# Patient Record
Sex: MALE | Race: WHITE | NOT HISPANIC OR LATINO | URBAN - METROPOLITAN AREA
[De-identification: names, ages, dates, MRNs, and addresses within clinical notes are randomized per-mention and may not be internally consistent; named-entity substitution may affect disease eponyms.]

---

## 2023-12-04 ENCOUNTER — APPOINTMENT (OUTPATIENT)
Dept: PRIMARY CARE | Facility: CLINIC | Age: 23
End: 2023-12-04
Payer: COMMERCIAL

## 2023-12-21 ENCOUNTER — APPOINTMENT (OUTPATIENT)
Dept: PRIMARY CARE | Facility: CLINIC | Age: 23
End: 2023-12-21
Payer: COMMERCIAL

## 2024-02-13 ENCOUNTER — APPOINTMENT (OUTPATIENT)
Dept: PRIMARY CARE | Facility: CLINIC | Age: 24
End: 2024-02-13
Payer: COMMERCIAL

## 2024-04-30 ENCOUNTER — HOSPITAL ENCOUNTER (INPATIENT)
Facility: HOSPITAL | Age: 24
LOS: 10 days | Discharge: NURSING, REHAB OR LONG-TERM CARE | DRG: 519 | End: 2024-05-11
Attending: EMERGENCY MEDICINE | Admitting: ORTHOPAEDIC SURGERY
Payer: COMMERCIAL

## 2024-04-30 ENCOUNTER — APPOINTMENT (OUTPATIENT)
Dept: RADIOLOGY | Facility: HOSPITAL | Age: 24
End: 2024-04-30
Payer: COMMERCIAL

## 2024-04-30 ENCOUNTER — APPOINTMENT (OUTPATIENT)
Dept: RADIOLOGY | Facility: HOSPITAL | Age: 24
DRG: 519 | End: 2024-04-30
Payer: COMMERCIAL

## 2024-04-30 ENCOUNTER — HOSPITAL ENCOUNTER (EMERGENCY)
Facility: HOSPITAL | Age: 24
Discharge: OTHER INSTITUTION | End: 2024-04-30
Attending: STUDENT IN AN ORGANIZED HEALTH CARE EDUCATION/TRAINING PROGRAM
Payer: COMMERCIAL

## 2024-04-30 VITALS
HEART RATE: 100 BPM | HEIGHT: 75 IN | BODY MASS INDEX: 39.17 KG/M2 | TEMPERATURE: 98.1 F | SYSTOLIC BLOOD PRESSURE: 161 MMHG | DIASTOLIC BLOOD PRESSURE: 107 MMHG | RESPIRATION RATE: 16 BRPM | OXYGEN SATURATION: 97 % | WEIGHT: 315 LBS

## 2024-04-30 DIAGNOSIS — M54.41 ACUTE MIDLINE LOW BACK PAIN WITH BILATERAL SCIATICA: Primary | ICD-10-CM

## 2024-04-30 DIAGNOSIS — M51.26 LUMBAR DISC HERNIATION: ICD-10-CM

## 2024-04-30 DIAGNOSIS — R20.0 SADDLE ANESTHESIA: ICD-10-CM

## 2024-04-30 DIAGNOSIS — M54.42 ACUTE MIDLINE LOW BACK PAIN WITH BILATERAL SCIATICA: Primary | ICD-10-CM

## 2024-04-30 DIAGNOSIS — M51.26 HERNIATED LUMBAR DISC WITHOUT MYELOPATHY: ICD-10-CM

## 2024-04-30 DIAGNOSIS — M54.50 ACUTE MIDLINE LOW BACK PAIN, UNSPECIFIED WHETHER SCIATICA PRESENT: Primary | ICD-10-CM

## 2024-04-30 LAB
ALBUMIN SERPL-MCNC: 4.4 G/DL (ref 3.5–5)
ALP BLD-CCNC: 83 U/L (ref 35–125)
ALT SERPL-CCNC: 82 U/L (ref 5–40)
ANION GAP SERPL CALC-SCNC: 15 MMOL/L
APPEARANCE UR: CLEAR
AST SERPL-CCNC: 41 U/L (ref 5–40)
BASOPHILS # BLD AUTO: 0.02 X10*3/UL (ref 0–0.1)
BASOPHILS NFR BLD AUTO: 0.2 %
BILIRUB SERPL-MCNC: 1.1 MG/DL (ref 0.1–1.2)
BILIRUB UR STRIP.AUTO-MCNC: NEGATIVE MG/DL
BUN SERPL-MCNC: 11 MG/DL (ref 8–25)
CALCIUM SERPL-MCNC: 9.7 MG/DL (ref 8.5–10.4)
CHLORIDE SERPL-SCNC: 101 MMOL/L (ref 97–107)
CO2 SERPL-SCNC: 22 MMOL/L (ref 24–31)
COLOR UR: YELLOW
CREAT SERPL-MCNC: 0.7 MG/DL (ref 0.4–1.6)
EGFRCR SERPLBLD CKD-EPI 2021: >90 ML/MIN/1.73M*2
EOSINOPHIL # BLD AUTO: 0.01 X10*3/UL (ref 0–0.7)
EOSINOPHIL NFR BLD AUTO: 0.1 %
ERYTHROCYTE [DISTWIDTH] IN BLOOD BY AUTOMATED COUNT: 14 % (ref 11.5–14.5)
GLUCOSE SERPL-MCNC: 91 MG/DL (ref 65–99)
GLUCOSE UR STRIP.AUTO-MCNC: NORMAL MG/DL
HCT VFR BLD AUTO: 44.7 % (ref 41–52)
HGB BLD-MCNC: 14.6 G/DL (ref 13.5–17.5)
IMM GRANULOCYTES # BLD AUTO: 0.03 X10*3/UL (ref 0–0.7)
IMM GRANULOCYTES NFR BLD AUTO: 0.2 % (ref 0–0.9)
KETONES UR STRIP.AUTO-MCNC: ABNORMAL MG/DL
LEUKOCYTE ESTERASE UR QL STRIP.AUTO: NEGATIVE
LYMPHOCYTES # BLD AUTO: 1.27 X10*3/UL (ref 1.2–4.8)
LYMPHOCYTES NFR BLD AUTO: 9.6 %
MCH RBC QN AUTO: 27.6 PG (ref 26–34)
MCHC RBC AUTO-ENTMCNC: 32.7 G/DL (ref 32–36)
MCV RBC AUTO: 85 FL (ref 80–100)
MONOCYTES # BLD AUTO: 0.56 X10*3/UL (ref 0.1–1)
MONOCYTES NFR BLD AUTO: 4.2 %
MUCOUS THREADS #/AREA URNS AUTO: NORMAL /LPF
NEUTROPHILS # BLD AUTO: 11.35 X10*3/UL (ref 1.2–7.7)
NEUTROPHILS NFR BLD AUTO: 85.7 %
NITRITE UR QL STRIP.AUTO: NEGATIVE
NRBC BLD-RTO: 0 /100 WBCS (ref 0–0)
PH UR STRIP.AUTO: 6.5 [PH]
PLATELET # BLD AUTO: 367 X10*3/UL (ref 150–450)
POTASSIUM SERPL-SCNC: 4.1 MMOL/L (ref 3.4–5.1)
PROT SERPL-MCNC: 8.3 G/DL (ref 5.9–7.9)
PROT UR STRIP.AUTO-MCNC: ABNORMAL MG/DL
RBC # BLD AUTO: 5.29 X10*6/UL (ref 4.5–5.9)
RBC # UR STRIP.AUTO: NEGATIVE /UL
RBC #/AREA URNS AUTO: NORMAL /HPF
SODIUM SERPL-SCNC: 138 MMOL/L (ref 133–145)
SP GR UR STRIP.AUTO: 1.03
SQUAMOUS #/AREA URNS AUTO: NORMAL /HPF
UROBILINOGEN UR STRIP.AUTO-MCNC: ABNORMAL MG/DL
WBC # BLD AUTO: 13.2 X10*3/UL (ref 4.4–11.3)
WBC #/AREA URNS AUTO: NORMAL /HPF

## 2024-04-30 PROCEDURE — 2500000006 HC RX 250 W HCPCS SELF ADMINISTERED DRUGS (ALT 637 FOR ALL PAYERS)

## 2024-04-30 PROCEDURE — 2500000001 HC RX 250 WO HCPCS SELF ADMINISTERED DRUGS (ALT 637 FOR MEDICARE OP)

## 2024-04-30 PROCEDURE — 72131 CT LUMBAR SPINE W/O DYE: CPT | Performed by: STUDENT IN AN ORGANIZED HEALTH CARE EDUCATION/TRAINING PROGRAM

## 2024-04-30 PROCEDURE — 72148 MRI LUMBAR SPINE W/O DYE: CPT

## 2024-04-30 PROCEDURE — 51798 US URINE CAPACITY MEASURE: CPT

## 2024-04-30 PROCEDURE — 2500000004 HC RX 250 GENERAL PHARMACY W/ HCPCS (ALT 636 FOR OP/ED): Performed by: PHYSICIAN ASSISTANT

## 2024-04-30 PROCEDURE — 99285 EMERGENCY DEPT VISIT HI MDM: CPT | Performed by: PHYSICIAN ASSISTANT

## 2024-04-30 PROCEDURE — 36415 COLL VENOUS BLD VENIPUNCTURE: CPT

## 2024-04-30 PROCEDURE — 2500000004 HC RX 250 GENERAL PHARMACY W/ HCPCS (ALT 636 FOR OP/ED): Performed by: EMERGENCY MEDICINE

## 2024-04-30 PROCEDURE — 99285 EMERGENCY DEPT VISIT HI MDM: CPT | Mod: 25

## 2024-04-30 PROCEDURE — 96374 THER/PROPH/DIAG INJ IV PUSH: CPT

## 2024-04-30 PROCEDURE — 84075 ASSAY ALKALINE PHOSPHATASE: CPT

## 2024-04-30 PROCEDURE — 2500000001 HC RX 250 WO HCPCS SELF ADMINISTERED DRUGS (ALT 637 FOR MEDICARE OP): Performed by: STUDENT IN AN ORGANIZED HEALTH CARE EDUCATION/TRAINING PROGRAM

## 2024-04-30 PROCEDURE — 81001 URINALYSIS AUTO W/SCOPE: CPT | Performed by: PHYSICIAN ASSISTANT

## 2024-04-30 PROCEDURE — 2500000004 HC RX 250 GENERAL PHARMACY W/ HCPCS (ALT 636 FOR OP/ED): Performed by: STUDENT IN AN ORGANIZED HEALTH CARE EDUCATION/TRAINING PROGRAM

## 2024-04-30 PROCEDURE — 96372 THER/PROPH/DIAG INJ SC/IM: CPT

## 2024-04-30 PROCEDURE — 72148 MRI LUMBAR SPINE W/O DYE: CPT | Performed by: STUDENT IN AN ORGANIZED HEALTH CARE EDUCATION/TRAINING PROGRAM

## 2024-04-30 PROCEDURE — 96374 THER/PROPH/DIAG INJ IV PUSH: CPT | Mod: 59

## 2024-04-30 PROCEDURE — 96361 HYDRATE IV INFUSION ADD-ON: CPT

## 2024-04-30 PROCEDURE — 72131 CT LUMBAR SPINE W/O DYE: CPT

## 2024-04-30 PROCEDURE — 96375 TX/PRO/DX INJ NEW DRUG ADDON: CPT

## 2024-04-30 PROCEDURE — 2500000004 HC RX 250 GENERAL PHARMACY W/ HCPCS (ALT 636 FOR OP/ED)

## 2024-04-30 PROCEDURE — 85025 COMPLETE CBC W/AUTO DIFF WBC: CPT

## 2024-04-30 PROCEDURE — 2500000005 HC RX 250 GENERAL PHARMACY W/O HCPCS

## 2024-04-30 RX ORDER — LIDOCAINE 560 MG/1
1 PATCH PERCUTANEOUS; TOPICAL; TRANSDERMAL DAILY
Status: DISCONTINUED | OUTPATIENT
Start: 2024-04-30 | End: 2024-04-30 | Stop reason: HOSPADM

## 2024-04-30 RX ORDER — OXYCODONE AND ACETAMINOPHEN 5; 325 MG/1; MG/1
1 TABLET ORAL ONCE
Status: DISCONTINUED | OUTPATIENT
Start: 2024-04-30 | End: 2024-04-30

## 2024-04-30 RX ORDER — KETOROLAC TROMETHAMINE 15 MG/ML
15 INJECTION, SOLUTION INTRAMUSCULAR; INTRAVENOUS ONCE
Status: COMPLETED | OUTPATIENT
Start: 2024-04-30 | End: 2024-04-30

## 2024-04-30 RX ORDER — MORPHINE SULFATE 4 MG/ML
4 INJECTION, SOLUTION INTRAMUSCULAR; INTRAVENOUS ONCE
Status: COMPLETED | OUTPATIENT
Start: 2024-04-30 | End: 2024-04-30

## 2024-04-30 RX ORDER — KETOROLAC TROMETHAMINE 30 MG/ML
15 INJECTION, SOLUTION INTRAMUSCULAR; INTRAVENOUS ONCE
Status: COMPLETED | OUTPATIENT
Start: 2024-04-30 | End: 2024-04-30

## 2024-04-30 RX ORDER — DEXAMETHASONE SODIUM PHOSPHATE 100 MG/10ML
10 INJECTION INTRAMUSCULAR; INTRAVENOUS EVERY 8 HOURS
Status: DISCONTINUED | OUTPATIENT
Start: 2024-05-01 | End: 2024-05-04

## 2024-04-30 RX ORDER — METHOCARBAMOL 100 MG/ML
1000 INJECTION, SOLUTION INTRAMUSCULAR; INTRAVENOUS ONCE
Status: COMPLETED | OUTPATIENT
Start: 2024-04-30 | End: 2024-04-30

## 2024-04-30 RX ORDER — DIAZEPAM 5 MG/1
5 TABLET ORAL ONCE
Status: COMPLETED | OUTPATIENT
Start: 2024-04-30 | End: 2024-04-30

## 2024-04-30 RX ORDER — DIAZEPAM 5 MG/ML
5 INJECTION, SOLUTION INTRAMUSCULAR; INTRAVENOUS ONCE
Status: DISCONTINUED | OUTPATIENT
Start: 2024-04-30 | End: 2024-04-30

## 2024-04-30 RX ORDER — METHOCARBAMOL 750 MG/1
1500 TABLET, FILM COATED ORAL ONCE
Status: COMPLETED | OUTPATIENT
Start: 2024-04-30 | End: 2024-04-30

## 2024-04-30 RX ORDER — MORPHINE SULFATE 4 MG/ML
4 INJECTION INTRAVENOUS ONCE
Status: COMPLETED | OUTPATIENT
Start: 2024-04-30 | End: 2024-04-30

## 2024-04-30 RX ADMIN — METHOCARBAMOL 1000 MG: 100 INJECTION INTRAMUSCULAR; INTRAVENOUS at 23:46

## 2024-04-30 RX ADMIN — MORPHINE SULFATE 4 MG: 4 INJECTION, SOLUTION INTRAMUSCULAR; INTRAVENOUS at 15:28

## 2024-04-30 RX ADMIN — METHOCARBAMOL 1500 MG: 750 TABLET ORAL at 11:59

## 2024-04-30 RX ADMIN — KETOROLAC TROMETHAMINE 15 MG: 30 INJECTION, SOLUTION INTRAMUSCULAR at 11:23

## 2024-04-30 RX ADMIN — KETOROLAC TROMETHAMINE 15 MG: 15 INJECTION, SOLUTION INTRAMUSCULAR; INTRAVENOUS at 23:45

## 2024-04-30 RX ADMIN — MORPHINE SULFATE 4 MG: 4 INJECTION INTRAVENOUS at 20:01

## 2024-04-30 RX ADMIN — LIDOCAINE 1 PATCH: 4 PATCH TOPICAL at 11:23

## 2024-04-30 RX ADMIN — SODIUM CHLORIDE, POTASSIUM CHLORIDE, SODIUM LACTATE AND CALCIUM CHLORIDE 1000 ML: 600; 310; 30; 20 INJECTION, SOLUTION INTRAVENOUS at 20:59

## 2024-04-30 RX ADMIN — DIAZEPAM 5 MG: 5 TABLET ORAL at 12:49

## 2024-04-30 RX ADMIN — DEXAMETHASONE SODIUM PHOSPHATE 16 MG: 4 INJECTION, SOLUTION INTRAMUSCULAR; INTRAVENOUS at 18:07

## 2024-04-30 ASSESSMENT — PAIN SCALES - GENERAL
PAINLEVEL_OUTOF10: 8
PAINLEVEL_OUTOF10: 4
PAINLEVEL_OUTOF10: 8
PAINLEVEL_OUTOF10: 2
PAINLEVEL_OUTOF10: 0 - NO PAIN
PAINLEVEL_OUTOF10: 8

## 2024-04-30 ASSESSMENT — PAIN - FUNCTIONAL ASSESSMENT
PAIN_FUNCTIONAL_ASSESSMENT: 0-10
PAIN_FUNCTIONAL_ASSESSMENT: 0-10

## 2024-04-30 ASSESSMENT — COLUMBIA-SUICIDE SEVERITY RATING SCALE - C-SSRS
6. HAVE YOU EVER DONE ANYTHING, STARTED TO DO ANYTHING, OR PREPARED TO DO ANYTHING TO END YOUR LIFE?: NO
1. IN THE PAST MONTH, HAVE YOU WISHED YOU WERE DEAD OR WISHED YOU COULD GO TO SLEEP AND NOT WAKE UP?: NO
2. HAVE YOU ACTUALLY HAD ANY THOUGHTS OF KILLING YOURSELF?: NO

## 2024-04-30 ASSESSMENT — PAIN DESCRIPTION - LOCATION: LOCATION: BACK

## 2024-04-30 ASSESSMENT — PAIN DESCRIPTION - PAIN TYPE: TYPE: ACUTE PAIN

## 2024-04-30 NOTE — ED PROVIDER NOTES
HPI   Chief Complaint   Patient presents with    Back Pain         Patient is a 24-year-old male with history of ADHD, learning disability, morbid obesity who presents today for evaluation of possible cauda equina, patient presents as a transfer from [facility].  Patient was initially seen and was having back pain, has had multiple episodes of sciatica for the last 3 to 4 years initial initially thought it was back, he woke up this morning and felt a sharp pain in his back, denies any falls injuries or traumas.  He then presented to [facility] ED where he had a CT scan.  While in CT patient had an episode of urinary incontinence and was having saddle paresthesias that were new onset.  He had a diminished rectal tone at [facility], there was concern for spinal cord compression however patient was unable to fit in the MRI scanner given that he weighs 471 pounds, for this reason the case was discussed with orthospine who accepted the patient and transferred here for MRI and spine evaluation.  Patient states his pain is starting to come back, he states that the morphine really helped at [facility], he also received Robaxin, Valium, Percocet, dexamethasone.                            Frederick Coma Scale Score: 15                     Patient History   No past medical history on file.  No past surgical history on file.  No family history on file.  Social History     Tobacco Use    Smoking status: Not on file    Smokeless tobacco: Not on file   Substance Use Topics    Alcohol use: Not on file    Drug use: Not on file       Physical Exam   ED Triage Vitals [04/30/24 1948]   Temperature Heart Rate Respirations BP   36.5 °C (97.7 °F) (!) 112 20 148/75      Pulse Ox Temp src Heart Rate Source Patient Position   98 % -- -- --      BP Location FiO2 (%)     -- --       Physical Exam  Vitals and nursing note reviewed.   Constitutional:       General: He is not in acute distress.     Appearance: Normal appearance.  He is obese. He is not toxic-appearing.   HENT:      Head: Normocephalic and atraumatic.      Nose: Nose normal.   Eyes:      Extraocular Movements: Extraocular movements intact.   Cardiovascular:      Rate and Rhythm: Normal rate and regular rhythm.   Pulmonary:      Effort: Pulmonary effort is normal.   Abdominal:      Palpations: Abdomen is soft.   Musculoskeletal:         General: Normal range of motion.      Cervical back: Normal range of motion and neck supple.      Comments:   No midline bruising swelling or deformity, exam limited by patient body habitus.  5 of 5 strength with hip flexion extension bilaterally, equal strength with dorsal and plantarflexion.  Patient states straining his legs makes the pain worse.  Equal sensation to bilateral lower extremities although patient endorses diminished sensation in his bilateral pinkies.     Skin:     General: Skin is warm and dry.   Neurological:      General: No focal deficit present.      Mental Status: He is alert.   Psychiatric:         Mood and Affect: Mood normal.         Thought Content: Thought content normal.       MR lumbar spine wo IV contrast    (Results Pending)     Labs Reviewed   URINALYSIS WITH REFLEX CULTURE AND MICROSCOPIC - Abnormal       Result Value    Color, Urine Yellow      Appearance, Urine Clear      Specific Gravity, Urine 1.026      pH, Urine 6.5      Protein, Urine 20 (TRACE)      Glucose, Urine Normal      Blood, Urine NEGATIVE      Ketones, Urine 20 (1+) (*)     Bilirubin, Urine NEGATIVE      Urobilinogen, Urine 2 (1+) (*)     Nitrite, Urine NEGATIVE      Leukocyte Esterase, Urine NEGATIVE     URINALYSIS WITH REFLEX CULTURE AND MICROSCOPIC    Narrative:     The following orders were created for panel order Urinalysis with Reflex Culture and Microscopic.  Procedure                               Abnormality         Status                     ---------                               -----------         ------                      Urinalysis with Reflex C...[[id number]]  Abnormal            Final result               Extra Urine Gray Tube[[id number]]                            In process                   Please view results for these tests on the individual orders.   EXTRA URINE GRAY TUBE   URINALYSIS MICROSCOPIC WITH REFLEX CULTURE    WBC, Urine 1-5      RBC, Urine 3-5      Squamous Epithelial Cells, Urine 1-9 (SPARSE)      Mucus, Urine FEW           ED Course & MDM   ED Course as of 04/30/24 2150   Tue Apr 30, 2024 1954 Ortho made aware that patient is here and in ED [MK]      ED Course User Index  [MK] Linda Ryan PA-C       Medical Decision Making    MDM: Patient is a 24-year-old male who presents today for evaluation of low back pain, at outlying facility had urinary incontinence and saddle paresthesias that were concerning for cauda equina, was transferred here for MRI given his weight. Postvoid residual was obtained which showed 50 cc of urine, urinalysis without evidence of infection.  At the time of this note patient is still pending MRI of the lumbar spine, orthospine came and saw the patient in the ED, recommended every 8 hour dexamethasone which was ordered.  Patient will be signed out to incoming provider to follow-up on MRI results.        Procedure  Procedures     Linda Ryan PA-C  04/30/24 2150

## 2024-04-30 NOTE — SIGNIFICANT EVENT
Expected Patient  Transferring Facility: [facility] ED  Accepting Service: Orthopedic Surgery    23 yo M w/ hx morbid obesity, p/w low back pain, c/f cauda equina.  CT w/ lumbar spinal stenosis. Developed saddle anesthesia and urinary incontinence while in [facility] ED. Sent for MRI.  Unable to get MRI at [facility] due to patient weight.  Accepted by Dr. Grier.    Joseph Bo MD  PGY 3 Emergency Medicine

## 2024-04-30 NOTE — ED TRIAGE NOTES
Pt transfer from [facility] for low back pain.    Pt needs to have mri done but was not available at [facility].

## 2024-04-30 NOTE — ED PROVIDER NOTES
HPI   Chief Complaint   Patient presents with    Back Pain     Pt bib ems c/o back pain, stated he feels like he damaged a nerve in his back       Patient is a 24-year-old morbidly obese male brought in by EMS from home for evaluation of back pain that started abruptly this morning.  Patient states he had an abrupt episode this morning when he was standing up of low back pain more so radiating down his left leg.  Patient states the symptoms are so bad that he has not been ambulatory since the pain onset this morning.  He states he has had this happen twice before and it was sciatica and he feels that is flaring up at this time.  He denies fever, chills, bowel or bladder incontinence, saddle anesthesia.  States he did have a brief episode of tingling of his right foot but has since resolved.                        Frederick Coma Scale Score: 15                     Patient History   No past medical history on file.  No past surgical history on file.  No family history on file.  Social History     Tobacco Use    Smoking status: Not on file    Smokeless tobacco: Not on file   Substance Use Topics    Alcohol use: Not on file    Drug use: Not on file       Physical Exam   ED Triage Vitals   Temp Pulse Resp BP   -- -- -- --      SpO2 Temp src Heart Rate Source Patient Position   -- -- -- --      BP Location FiO2 (%)     -- --       Physical Exam  Vitals and nursing note reviewed.   Constitutional:       General: He is not in acute distress.     Appearance: He is well-developed.      Comments: Morbidly obese male lying in hospital bed appearing uncomfortable but not in acute distress   HENT:      Head: Normocephalic and atraumatic.   Eyes:      Conjunctiva/sclera: Conjunctivae normal.   Cardiovascular:      Rate and Rhythm: Normal rate and regular rhythm.      Heart sounds: No murmur heard.  Pulmonary:      Effort: Pulmonary effort is normal. No respiratory distress.      Breath sounds: Normal breath sounds.   Abdominal:       Palpations: Abdomen is soft.      Tenderness: There is no abdominal tenderness.   Musculoskeletal:         General: No swelling.      Cervical back: Neck supple.   Skin:     General: Skin is warm and dry.      Capillary Refill: Capillary refill takes less than 2 seconds.   Neurological:      Mental Status: He is alert.      Comments: Reflexes intact.  Sensation of bilateral lower extremities intact.  Intact dorsiflexion and plantarflexion of bilateral lower extremities.  Intact cremasteric reflex.   Psychiatric:         Mood and Affect: Mood normal.         ED Course & MDM   ED Course as of 04/30/24 1837 Tue Apr 30, 2024 1836 Upon arrival back from CT patient was endorsing difficulty clenching his buttocks, episode of urinary incontinence that he was not aware of and no saddle anesthesia concerning for compression.  Our MRI is unable to accommodate this patient due to his weight.  I spoke with Dr. Robin shah at [facility] who would like the patient to be transferred ER to ER for further evaluation via MRI.  Spoke with ER physician Dr. Bo who is aware the patient will be transferred ER to ER for MRI for further evaluation. [JJ]      ED Course User Index  [JJ] Becky Newberry PA-C         Diagnoses as of 04/30/24 1837   Acute midline low back pain with bilateral sciatica   Saddle anesthesia       Medical Decision Making  Parts of this chart have been completed using voice recognition software. Please excuse any errors of transcription.  My thought process and reason for plan has been formulated from the time that I saw the patient until the time of disposition and is not specific to one specific moment during their visit and furthermore my MDM encompasses this entire chart and not only this text box.      HPI: Detailed above.    Exam: A medically appropriate exam performed, outlined above, given the known history and presentation.    History obtained from: Patient    Social Determinants of Health considered  during this visit: Lives independently    Medications given during visit:  Medications   lidocaine 4 % patch 1 patch (1 patch transdermal Medication Applied 4/30/24 1123)   ketorolac (Toradol) injection 15 mg (15 mg intramuscular Given 4/30/24 1123)   methocarbamol (Robaxin) tablet 1,500 mg (1,500 mg oral Given 4/30/24 1159)   diazePAM (Valium) tablet 5 mg (5 mg oral Given 4/30/24 1249)   morphine injection 4 mg (4 mg intravenous Given 4/30/24 1528)   dexAMETHasone (Decadron) 16 mg in dextrose 5 % in water (D5W) 50 mL IV (16 mg intravenous New Bag 4/30/24 1807)        Diagnostic/tests  Labs Reviewed   CBC WITH AUTO DIFFERENTIAL - Abnormal       Result Value    WBC 13.2 (*)     nRBC 0.0      RBC 5.29      Hemoglobin 14.6      Hematocrit 44.7      MCV 85      MCH 27.6      MCHC 32.7      RDW 14.0      Platelets 367      Neutrophils % 85.7      Immature Granulocytes %, Automated 0.2      Lymphocytes % 9.6      Monocytes % 4.2      Eosinophils % 0.1      Basophils % 0.2      Neutrophils Absolute 11.35 (*)     Immature Granulocytes Absolute, Automated 0.03      Lymphocytes Absolute 1.27      Monocytes Absolute 0.56      Eosinophils Absolute 0.01      Basophils Absolute 0.02     COMPREHENSIVE METABOLIC PANEL - Abnormal    Glucose 91      Sodium 138      Potassium 4.1      Chloride 101      Bicarbonate 22 (*)     Urea Nitrogen 11      Creatinine 0.70      eGFR >90      Calcium 9.7      Albumin 4.4      Alkaline Phosphatase 83      Total Protein 8.3 (*)     AST 41 (*)     Bilirubin, Total 1.1      ALT 82 (*)     Anion Gap 15        CT lumbar spine wo IV contrast   Final Result   1. Diffuse congenital lumbar spine canal stenosis secondary to   multilevel shortened pedicles, with resultant moderate to severe   canal stenosis and neural foraminal narrowing. Superimposed   multilevel L4-L5, which worsens canal stenosis   2. No acute fracture or traumatic malalignment.        Signed by: Derrick Casas 4/30/2024 4:21 PM    Dictation workstation:   [id number]      MR lumbar spine wo IV contrast    (Results Pending)        Considerations/further MDM:  24-year-old male with history of morbid obesity presenting for evaluation of low back pain.  During the ER visit patient is uncomfortable due to pain but otherwise in no acute distress.  Respirations are nonlabored.  On exam, patient is able to move bilateral lower extremities denies any intact reflexes upon arrival.  Symptomatic treatment was initiated and patient was given Toradol, lidocaine patch and Robaxin without resolution of symptoms.  Diazepam tablet was administered with persistent symptoms and thus morphine was given and patient was taken to CT for further evaluation.  CT lumbar spine with moderate to severe canal stenosis.  Upon arrival back from CT patient was endorsing saddle anesthesia and episode of urinary incontinence.  Rectal tone present but diminished on my exam.  Patient was given 6 mg Decadron IV for treatment of possible cord compression.  There is concern for cord compression.  As discussed in ED course, MRI is not able to accommodate this patient due to his weight at our facility thus I had a discussion with providers at [facility] who are agreeable to accepting the patient for transfer for ER to ER for MRI to rule out cord compression.  This was discussed with the patient and he is agreeable to this.  EMTALA form was filled out and the patient was transferred in stable condition.  He remained stable during the ER visit.      Procedure  Procedures     Becky Newberry PA-C  04/30/24 9331

## 2024-05-01 ENCOUNTER — HOSPITAL ENCOUNTER (OUTPATIENT)
Facility: HOSPITAL | Age: 24
Setting detail: SURGERY ADMIT
End: 2024-05-01
Attending: ORTHOPAEDIC SURGERY | Admitting: ORTHOPAEDIC SURGERY
Payer: COMMERCIAL

## 2024-05-01 ENCOUNTER — ANESTHESIA EVENT (OUTPATIENT)
Dept: OPERATING ROOM | Facility: HOSPITAL | Age: 24
DRG: 519 | End: 2024-05-01
Payer: COMMERCIAL

## 2024-05-01 ENCOUNTER — CLINICAL SUPPORT (OUTPATIENT)
Dept: EMERGENCY MEDICINE | Facility: HOSPITAL | Age: 24
DRG: 519 | End: 2024-05-01
Payer: COMMERCIAL

## 2024-05-01 ENCOUNTER — APPOINTMENT (OUTPATIENT)
Dept: RADIOLOGY | Facility: HOSPITAL | Age: 24
DRG: 519 | End: 2024-05-01
Payer: COMMERCIAL

## 2024-05-01 PROBLEM — M54.50 ACUTE MIDLINE LOW BACK PAIN, UNSPECIFIED WHETHER SCIATICA PRESENT: Status: ACTIVE | Noted: 2024-05-01

## 2024-05-01 PROBLEM — M51.26 HERNIATED LUMBAR DISC WITHOUT MYELOPATHY: Status: ACTIVE | Noted: 2024-04-30

## 2024-05-01 LAB
ABO GROUP (TYPE) IN BLOOD: NORMAL
ANTIBODY SCREEN: NORMAL
APTT PPP: 35 SECONDS (ref 27–38)
HOLD SPECIMEN: NORMAL
INR PPP: 1.2 (ref 0.9–1.1)
PROTHROMBIN TIME: 13.4 SECONDS (ref 9.8–12.8)
RH FACTOR (ANTIGEN D): NORMAL

## 2024-05-01 PROCEDURE — 71045 X-RAY EXAM CHEST 1 VIEW: CPT | Mod: FOREIGN READ | Performed by: RADIOLOGY

## 2024-05-01 PROCEDURE — 86901 BLOOD TYPING SEROLOGIC RH(D): CPT

## 2024-05-01 PROCEDURE — 93005 ELECTROCARDIOGRAM TRACING: CPT

## 2024-05-01 PROCEDURE — 71045 X-RAY EXAM CHEST 1 VIEW: CPT

## 2024-05-01 PROCEDURE — 2500000001 HC RX 250 WO HCPCS SELF ADMINISTERED DRUGS (ALT 637 FOR MEDICARE OP)

## 2024-05-01 PROCEDURE — 99223 1ST HOSP IP/OBS HIGH 75: CPT | Performed by: ORTHOPAEDIC SURGERY

## 2024-05-01 PROCEDURE — 36415 COLL VENOUS BLD VENIPUNCTURE: CPT

## 2024-05-01 PROCEDURE — 2500000004 HC RX 250 GENERAL PHARMACY W/ HCPCS (ALT 636 FOR OP/ED): Performed by: PHYSICIAN ASSISTANT

## 2024-05-01 PROCEDURE — 85610 PROTHROMBIN TIME: CPT

## 2024-05-01 PROCEDURE — 2500000004 HC RX 250 GENERAL PHARMACY W/ HCPCS (ALT 636 FOR OP/ED)

## 2024-05-01 PROCEDURE — 1100000001 HC PRIVATE ROOM DAILY

## 2024-05-01 RX ORDER — NALOXONE HYDROCHLORIDE 0.4 MG/ML
0.2 INJECTION, SOLUTION INTRAMUSCULAR; INTRAVENOUS; SUBCUTANEOUS EVERY 5 MIN PRN
Status: DISCONTINUED | OUTPATIENT
Start: 2024-05-01 | End: 2024-05-11 | Stop reason: HOSPADM

## 2024-05-01 RX ORDER — OXYCODONE HYDROCHLORIDE 5 MG/1
10 TABLET ORAL EVERY 4 HOURS PRN
Status: DISCONTINUED | OUTPATIENT
Start: 2024-05-01 | End: 2024-05-11 | Stop reason: HOSPADM

## 2024-05-01 RX ORDER — ONDANSETRON 4 MG/1
4 TABLET, FILM COATED ORAL EVERY 8 HOURS PRN
Status: DISCONTINUED | OUTPATIENT
Start: 2024-05-01 | End: 2024-05-11 | Stop reason: HOSPADM

## 2024-05-01 RX ORDER — DULAGLUTIDE 0.75 MG/.5ML
0.75 INJECTION, SOLUTION SUBCUTANEOUS
COMMUNITY

## 2024-05-01 RX ORDER — ONDANSETRON HYDROCHLORIDE 2 MG/ML
4 INJECTION, SOLUTION INTRAVENOUS EVERY 8 HOURS PRN
Status: DISCONTINUED | OUTPATIENT
Start: 2024-05-01 | End: 2024-05-11 | Stop reason: HOSPADM

## 2024-05-01 RX ORDER — CYCLOBENZAPRINE HCL 10 MG
10 TABLET ORAL 3 TIMES DAILY PRN
Status: DISCONTINUED | OUTPATIENT
Start: 2024-05-01 | End: 2024-05-02

## 2024-05-01 RX ORDER — ACETAMINOPHEN 325 MG/1
650 TABLET ORAL EVERY 6 HOURS SCHEDULED
Status: DISCONTINUED | OUTPATIENT
Start: 2024-05-01 | End: 2024-05-11 | Stop reason: HOSPADM

## 2024-05-01 RX ORDER — OXYCODONE HYDROCHLORIDE 5 MG/1
5 TABLET ORAL EVERY 4 HOURS PRN
Status: DISCONTINUED | OUTPATIENT
Start: 2024-05-01 | End: 2024-05-11 | Stop reason: HOSPADM

## 2024-05-01 RX ORDER — HYDROMORPHONE HYDROCHLORIDE 1 MG/ML
0.5 INJECTION, SOLUTION INTRAMUSCULAR; INTRAVENOUS; SUBCUTANEOUS EVERY 2 HOUR PRN
Status: DISCONTINUED | OUTPATIENT
Start: 2024-05-01 | End: 2024-05-11 | Stop reason: HOSPADM

## 2024-05-01 RX ADMIN — ACETAMINOPHEN 650 MG: 325 TABLET ORAL at 17:36

## 2024-05-01 RX ADMIN — OXYCODONE HYDROCHLORIDE 5 MG: 5 TABLET ORAL at 15:22

## 2024-05-01 RX ADMIN — CYCLOBENZAPRINE 10 MG: 10 TABLET, FILM COATED ORAL at 21:08

## 2024-05-01 RX ADMIN — CYCLOBENZAPRINE 10 MG: 10 TABLET, FILM COATED ORAL at 11:05

## 2024-05-01 RX ADMIN — ACETAMINOPHEN 650 MG: 325 TABLET ORAL at 11:05

## 2024-05-01 RX ADMIN — OXYCODONE HYDROCHLORIDE 10 MG: 5 TABLET ORAL at 11:05

## 2024-05-01 RX ADMIN — DEXAMETHASONE SODIUM PHOSPHATE 10 MG: 10 INJECTION INTRAMUSCULAR; INTRAVENOUS at 02:49

## 2024-05-01 RX ADMIN — OXYCODONE HYDROCHLORIDE 10 MG: 5 TABLET ORAL at 06:06

## 2024-05-01 RX ADMIN — DEXAMETHASONE SODIUM PHOSPHATE 10 MG: 10 INJECTION INTRAMUSCULAR; INTRAVENOUS at 09:35

## 2024-05-01 RX ADMIN — ACETAMINOPHEN 650 MG: 325 TABLET ORAL at 06:06

## 2024-05-01 RX ADMIN — DEXAMETHASONE SODIUM PHOSPHATE 10 MG: 10 INJECTION INTRAMUSCULAR; INTRAVENOUS at 17:36

## 2024-05-01 RX ADMIN — HYDROMORPHONE HYDROCHLORIDE 0.5 MG: 1 INJECTION, SOLUTION INTRAMUSCULAR; INTRAVENOUS; SUBCUTANEOUS at 21:08

## 2024-05-01 SDOH — ECONOMIC STABILITY: INCOME INSECURITY: IN THE LAST 12 MONTHS, WAS THERE A TIME WHEN YOU WERE NOT ABLE TO PAY THE MORTGAGE OR RENT ON TIME?: YES

## 2024-05-01 SDOH — ECONOMIC STABILITY: HOUSING INSECURITY
IN THE LAST 12 MONTHS, WAS THERE A TIME WHEN YOU DID NOT HAVE A STEADY PLACE TO SLEEP OR SLEPT IN A SHELTER (INCLUDING NOW)?: NO

## 2024-05-01 SDOH — SOCIAL STABILITY: SOCIAL INSECURITY: ARE YOU OR HAVE YOU BEEN THREATENED OR ABUSED PHYSICALLY, EMOTIONALLY, OR SEXUALLY BY ANYONE?: NO

## 2024-05-01 SDOH — SOCIAL STABILITY: SOCIAL INSECURITY: HAVE YOU HAD ANY THOUGHTS OF HARMING ANYONE ELSE?: NO

## 2024-05-01 SDOH — SOCIAL STABILITY: SOCIAL INSECURITY: HAS ANYONE EVER THREATENED TO HURT YOUR FAMILY OR YOUR PETS?: NO

## 2024-05-01 SDOH — SOCIAL STABILITY: SOCIAL INSECURITY: HAVE YOU HAD THOUGHTS OF HARMING ANYONE ELSE?: NO

## 2024-05-01 SDOH — ECONOMIC STABILITY: INCOME INSECURITY: HOW HARD IS IT FOR YOU TO PAY FOR THE VERY BASICS LIKE FOOD, HOUSING, MEDICAL CARE, AND HEATING?: VERY HARD

## 2024-05-01 SDOH — ECONOMIC STABILITY: TRANSPORTATION INSECURITY
IN THE PAST 12 MONTHS, HAS THE LACK OF TRANSPORTATION KEPT YOU FROM MEDICAL APPOINTMENTS OR FROM GETTING MEDICATIONS?: YES

## 2024-05-01 SDOH — HEALTH STABILITY: MENTAL HEALTH: HOW OFTEN DO YOU HAVE A DRINK CONTAINING ALCOHOL?: NEVER

## 2024-05-01 SDOH — HEALTH STABILITY: MENTAL HEALTH: HOW OFTEN DO YOU HAVE 6 OR MORE DRINKS ON ONE OCCASION?: NEVER

## 2024-05-01 SDOH — SOCIAL STABILITY: SOCIAL INSECURITY: DOES ANYONE TRY TO KEEP YOU FROM HAVING/CONTACTING OTHER FRIENDS OR DOING THINGS OUTSIDE YOUR HOME?: NO

## 2024-05-01 SDOH — SOCIAL STABILITY: SOCIAL INSECURITY: DO YOU FEEL UNSAFE GOING BACK TO THE PLACE WHERE YOU ARE LIVING?: NO

## 2024-05-01 SDOH — SOCIAL STABILITY: SOCIAL INSECURITY: ABUSE: ADULT

## 2024-05-01 SDOH — HEALTH STABILITY: MENTAL HEALTH: HOW MANY STANDARD DRINKS CONTAINING ALCOHOL DO YOU HAVE ON A TYPICAL DAY?: PATIENT DOES NOT DRINK

## 2024-05-01 SDOH — ECONOMIC STABILITY: HOUSING INSECURITY: IN THE LAST 12 MONTHS, HOW MANY PLACES HAVE YOU LIVED?: 2

## 2024-05-01 SDOH — SOCIAL STABILITY: SOCIAL INSECURITY: ARE THERE ANY APPARENT SIGNS OF INJURIES/BEHAVIORS THAT COULD BE RELATED TO ABUSE/NEGLECT?: NO

## 2024-05-01 SDOH — SOCIAL STABILITY: SOCIAL INSECURITY: DO YOU FEEL ANYONE HAS EXPLOITED OR TAKEN ADVANTAGE OF YOU FINANCIALLY OR OF YOUR PERSONAL PROPERTY?: NO

## 2024-05-01 SDOH — ECONOMIC STABILITY: TRANSPORTATION INSECURITY
IN THE PAST 12 MONTHS, HAS LACK OF TRANSPORTATION KEPT YOU FROM MEETINGS, WORK, OR FROM GETTING THINGS NEEDED FOR DAILY LIVING?: YES

## 2024-05-01 ASSESSMENT — COGNITIVE AND FUNCTIONAL STATUS - GENERAL
CLIMB 3 TO 5 STEPS WITH RAILING: TOTAL
DRESSING REGULAR LOWER BODY CLOTHING: A LOT
DRESSING REGULAR UPPER BODY CLOTHING: A LOT
HELP NEEDED FOR BATHING: A LOT
PATIENT BASELINE BEDBOUND: NO
DRESSING REGULAR LOWER BODY CLOTHING: A LOT
DAILY ACTIVITIY SCORE: 16
STANDING UP FROM CHAIR USING ARMS: TOTAL
DRESSING REGULAR UPPER BODY CLOTHING: A LOT
TURNING FROM BACK TO SIDE WHILE IN FLAT BAD: A LOT
TOILETING: A LOT
WALKING IN HOSPITAL ROOM: TOTAL
MOBILITY SCORE: 9
DAILY ACTIVITIY SCORE: 16
STANDING UP FROM CHAIR USING ARMS: TOTAL
TURNING FROM BACK TO SIDE WHILE IN FLAT BAD: A LOT
TOILETING: A LOT
MOVING FROM LYING ON BACK TO SITTING ON SIDE OF FLAT BED WITH BEDRAILS: A LOT
MOVING TO AND FROM BED TO CHAIR: A LOT
MOVING FROM LYING ON BACK TO SITTING ON SIDE OF FLAT BED WITH BEDRAILS: A LOT
CLIMB 3 TO 5 STEPS WITH RAILING: TOTAL
MOVING TO AND FROM BED TO CHAIR: A LOT
WALKING IN HOSPITAL ROOM: TOTAL
MOBILITY SCORE: 9
HELP NEEDED FOR BATHING: A LOT

## 2024-05-01 ASSESSMENT — PAIN - FUNCTIONAL ASSESSMENT
PAIN_FUNCTIONAL_ASSESSMENT: 0-10

## 2024-05-01 ASSESSMENT — COLUMBIA-SUICIDE SEVERITY RATING SCALE - C-SSRS
2. HAVE YOU ACTUALLY HAD ANY THOUGHTS OF KILLING YOURSELF?: NO
6. HAVE YOU EVER DONE ANYTHING, STARTED TO DO ANYTHING, OR PREPARED TO DO ANYTHING TO END YOUR LIFE?: NO
1. IN THE PAST MONTH, HAVE YOU WISHED YOU WERE DEAD OR WISHED YOU COULD GO TO SLEEP AND NOT WAKE UP?: NO

## 2024-05-01 ASSESSMENT — PAIN SCALES - GENERAL
PAINLEVEL_OUTOF10: 5 - MODERATE PAIN
PAINLEVEL_OUTOF10: 7
PAINLEVEL_OUTOF10: 1
PAINLEVEL_OUTOF10: 8
PAINLEVEL_OUTOF10: 0 - NO PAIN
PAINLEVEL_OUTOF10: 8

## 2024-05-01 ASSESSMENT — ACTIVITIES OF DAILY LIVING (ADL)
ADEQUATE_TO_COMPLETE_ADL: YES
HEARING - LEFT EAR: FUNCTIONAL
BATHING: NEEDS ASSISTANCE
ASSISTIVE_DEVICE: WHEELCHAIR;WALKER
FEEDING YOURSELF: INDEPENDENT
JUDGMENT_ADEQUATE_SAFELY_COMPLETE_DAILY_ACTIVITIES: YES
TOILETING: NEEDS ASSISTANCE
WALKS IN HOME: NEEDS ASSISTANCE
DRESSING YOURSELF: NEEDS ASSISTANCE
HEARING - RIGHT EAR: FUNCTIONAL
GROOMING: NEEDS ASSISTANCE
PATIENT'S MEMORY ADEQUATE TO SAFELY COMPLETE DAILY ACTIVITIES?: YES

## 2024-05-01 ASSESSMENT — LIFESTYLE VARIABLES
AUDIT-C TOTAL SCORE: 0
SKIP TO QUESTIONS 9-10: 1
AUDIT-C TOTAL SCORE: 0
HOW OFTEN DO YOU HAVE A DRINK CONTAINING ALCOHOL: NEVER
AUDIT-C TOTAL SCORE: 0
SKIP TO QUESTIONS 9-10: 1
HOW OFTEN DO YOU HAVE 6 OR MORE DRINKS ON ONE OCCASION: NEVER
HOW MANY STANDARD DRINKS CONTAINING ALCOHOL DO YOU HAVE ON A TYPICAL DAY: PATIENT DOES NOT DRINK

## 2024-05-01 ASSESSMENT — PATIENT HEALTH QUESTIONNAIRE - PHQ9
SUM OF ALL RESPONSES TO PHQ9 QUESTIONS 1 & 2: 0
2. FEELING DOWN, DEPRESSED OR HOPELESS: NOT AT ALL
1. LITTLE INTEREST OR PLEASURE IN DOING THINGS: NOT AT ALL

## 2024-05-01 NOTE — PROGRESS NOTES
I met with Erwin at the bedside regarding discharge planning and home going needs. Patient states that he would like to meet with the  [employer] for help with eviction he states that he lost his job and that he is now facing eviction. He states that he does not know where he will go after being discharged from the hospital. Patient was previously independent with ADL's without assistive devices. Patient is not medically cleared for discharge at this time pending surgery and post surgical therapy needs. I will continue to follow with a safe discharge plan.

## 2024-05-01 NOTE — PROGRESS NOTES
Admission medication reconciliation    Medications that are actionable for primary team:  Trulicity    Team paged/messaged on epic chat:  Ortho spine      Additional comments:   NON-formulary    Attempt #1: 5/1 @ 0917

## 2024-05-01 NOTE — CONSULTS
Orthopaedic Surgery Consult H&P    HPI:   Orthopaedic Problems/Injuries: r/o MARKELL    24M (morbid obesity) presents w LBP flare up, spontaneously developed saddle anesthesia and 1 episode of urinary incontinence at outside ED while in the CT. Transferred to [facility] due to weight he was unable to fit in the MRI. Decadron 16mg at OSH. Endorses new LE numbness in b/l small toes.  No incidence of urinary incontinence since transfer.  He was able to void in our ED with postvoid residual of 15ml. Endorses reduced perianal sensation, weak rectal tone otherwise moving bilateral upper and lower extremities.        PMH: per above/EMR  PSH: per above/EMR  SocHx: Non-contributory to this patient's acute orthopaedic problem. Non-contributory to this patient's acute orthopaedic problem.   FamHx:  Non-contributory to this patient's acute orthopaedic problem.   Allergies: Reviewed in EMR  Meds: Reviewed in EMR    ROS      - 14 point ROS negative except as above    Physical Exam:  Gen: AOx3, NAD  HEENT: normocephalic atraumatic  Psych: appropriate mood and affect  Resp: nonlabored breathing  Cardiac: Extremities WWP, RRR to peripheral palpation  Neuro: CN 2-12 grossly intact  Skin: no rashes    Spine Exam:    L1: SILT       L2: SILT      Hip flexors 4/5 Left; 5/5 Right  L3: SILT      Knee extension 5/5 Left; 5/5 Right  L4: SILT      Tib Ant. (Dorsiflexion) 5/5 Left; 5/5 Right  L5: SILT      EHL 5/5 Left; 5/5 Right  S1: numbness in S1 distribution     Plantarflexion 5/5 Left; 5/5 Right    Babinkski: Intact  No clonus  Weak rectal tone and with reduced richard-anal sensation    A full secondary exam was performed and all relevant findings discussed and noted above.    Imaging:  MRI L spine displays:    Congenitally shortened pedicles throughout the lumbar spine  contributing to background of mild spinal canal stenosis, with  superimposed 1.9 x 0.8 cm disc herniation present at the level of  L4-L5 contributing to severe spinal canal stenosis in  conjunction  with dorsal epidural lipomatosis. There is effacement of the  subarticular recesses bilaterally. Surgical consultation is  recommended.    Assessment:  Injury: r/o MARKELL  HPI: 24M (morbid obesity) presents w LBP flare up, spontaneously developed saddle anesthesia and 1 episode of urinary incontinence at the outside ED in the CT.  Transferred here due to patient's body habitus and inability to get MRI at the outside hospital.  Patient was able to void on his own in the emergency room here with postvoid residual 15 cc.  He has good strength in bilateral lower extremity.  Low suspicion for cauda equina syndrome    Plan:  - Admit to Ortho Spine; c/p for OR on 5/2 for L4-5 lami/discectomy  - Please obtain pre-operative labs prior to transfer to floor: CXR, EKG, CBC, BMP, COAGS, T&S  - Feeding: NPO on 5/2  - Analgesia: Multimodal  - Infection: No abx indicated   - Lines: Maintain PIVx2 while inpatient  - Embolic ppx: SCDs only, hold chemo DVT ppx   - C/w home medications  - Dispo pending OR    D/w Dr. Grier    This consult was seen and staffed within 30 minutes.    Victor M Marquez MD  PGY-2 Orthopaedic Surgery  On-call Resident    While admitted, this patient will be followed by the Ortho Spine Team, available via Epic Chat weekdays 6a-6p. Please page [phone] on nights and weekends.    Ortho Spine  First Call: Wiliam Ng, PGY-2  Second Call: Ar Edwards, PGY-4    I saw and evaluated the patient.  I personally obtained the key and critical portions of the history and physical exam or was physically present for key and critical portions performed by the Resident. I reviewed the documentation and discussed the patient with the Resident.  I agree with the Resident’s medical decision making as documented in the note.    24-year-old male morbidly obese 471 pounds who presents to outside hospital initially with acute low back pain and radicular leg pain and while at the outside hospital started to develop saddle  anesthesia and had 1 episode of urinary incontinence while he was in the CT.  Due to his body habitus they were unable to fit him in the MRI and therefore transferred to our hospital.  MRI demonstrated congenital lumbar stenosis with disc herniation L4-L5 causing severe canal stenosis.  Patient was able to void once transferred to our hospital with postvoid residual 15 cc.  He had full strength in bilateral lower extremity.  I have low suspicion for cauda equina syndrome however he may be having impending cauda equina syndrome and therefore recommended surgery for L4-L5 laminectomy decompression.    I discussed the risks of surgery which includes but not limited to infection at the surgical site or wound healing requiring additional surgery to clean the infection and long term IV antibiotics.  I discussed with him that given his body habitus and his sedentary lifestyle that he is at high risk of wound complication and infection requiring additional surgery if he gets infected.  There is risk of blood loss requiring blood transfusion. Risk of dural tear requiring repair and possible continue cerebral spinal fluid leakage and positional headaches. Risk of Nerve root injury resulting in temporary or permeant weakness, numbness, or radicular pain. Risk of epidural hematoma and spinal cord compression resulting in weakness in extremity and bowel and bladder disturbances requiring additional surgery to clear out hematoma.  Risk of reherniation at the same level.  Risk of adjacent level disease in the future requiring additional surgery in the future to address this. Risk of needing to remain intubated after surgery for facial swelling. Risk of blindness if need to be in a prone position for surgery. Other complications include skin breakdown or skin blistering from being prone for long hours, developing MI, stroke, DVT, PE during or after surgery. Risk of death. Risk of complications from anesthesia requiring prolong  intubation.     After our discussion patient elected proceed with surgery and consent was obtained.

## 2024-05-01 NOTE — CARE PLAN
The patient's goals for the shift include rest    The clinical goals for the shift include pt remain hds throughout the shift    Over the shift, the patient did make progress toward the following goals.

## 2024-05-01 NOTE — PROGRESS NOTES
Pharmacy Medication History Review    Erwin Cline is a 24 y.o. male admitted for Acute midline low back pain, unspecified whether sciatica present. Pharmacy reviewed the patient's [id number] medications and allergies for accuracy.    The list below reflects the updated PTA list. Comments regarding how patient may be taking medications differently can be found in the Admit Orders Activity  Prior to. Admission Medications   Prescriptions Last Dose Informant   Trulicity 0.75 mg/0.5 mL pen injector Past Week at patient states that  last dose was last week tuesday Self   Sig: Inject 0.75 mg under the skin 1 (one) time per week.      Facility-Administered Medications: None        The list below reflects the updated allergy list. Please review each documented allergy for additional clarification and justification.  Allergies  Reviewed by Clem Jones on 5/1/2024   No Known Allergies         Patient accepts M2B at discharge. Pharmacy has been updated to [facility]  Sources used to complete the med history include   Allergy list epic  Epic dispense history  Patient interview   Oarrs ( none )    Below are additional concerns with the patient's PTA list.  Patient is a excellent historian - patient knows medication name dose frequency and indication   Patient state that he takes trulicity weekly on Tuesdays - patient states that he missed yesterdays of trulicity.     Clem Jones [facility]  Transitions of Care Pharmacy Technician  [facility]  Please reach out via [employer] Secure Chat for questions, or if no response call [phone] or [employer] “MedRec”

## 2024-05-01 NOTE — PROGRESS NOTES
[facility] updated SW that patient requested to speak with SW. SW met with patient at bedside. Patient states he was fired from his job a few days ago and believes it was due to discrimination. Patient reports he filed a complaint with the EEOC. Patient expressed concern regarding affording rent. Patient states his lives with a roommate and he is responsible for half of the rent, about $450. Patient expresses d/t his weight and lack of motivation that he has not cleaned his room. Patient reports he is making arrangements for his room to be serviced. Patient is interested in rental assistance and counseling resources. SW requested [facility] to assist. [facility] confirmed they will follow up. Patient states he is having surgery tomorrow and expressed interest in a wheelchair. SW notified patient that SW will follow up on PT recs after surgery. [facility] notified. SW will continue to follow.     CONSTANCE Cordero

## 2024-05-02 ENCOUNTER — ANESTHESIA (OUTPATIENT)
Dept: OPERATING ROOM | Facility: HOSPITAL | Age: 24
DRG: 519 | End: 2024-05-02
Payer: COMMERCIAL

## 2024-05-02 ENCOUNTER — APPOINTMENT (OUTPATIENT)
Dept: RADIOLOGY | Facility: HOSPITAL | Age: 24
DRG: 519 | End: 2024-05-02
Payer: COMMERCIAL

## 2024-05-02 PROBLEM — E66.9 OBESITY: Status: ACTIVE | Noted: 2024-05-02

## 2024-05-02 LAB
ABO GROUP (TYPE) IN BLOOD: NORMAL
RH FACTOR (ANTIGEN D): NORMAL

## 2024-05-02 PROCEDURE — 01NB0ZZ RELEASE LUMBAR NERVE, OPEN APPROACH: ICD-10-PCS | Performed by: ORTHOPAEDIC SURGERY

## 2024-05-02 PROCEDURE — 2500000001 HC RX 250 WO HCPCS SELF ADMINISTERED DRUGS (ALT 637 FOR MEDICARE OP)

## 2024-05-02 PROCEDURE — 1100000001 HC PRIVATE ROOM DAILY

## 2024-05-02 PROCEDURE — 00NY0ZZ RELEASE LUMBAR SPINAL CORD, OPEN APPROACH: ICD-10-PCS | Performed by: ORTHOPAEDIC SURGERY

## 2024-05-02 PROCEDURE — 2500000004 HC RX 250 GENERAL PHARMACY W/ HCPCS (ALT 636 FOR OP/ED)

## 2024-05-02 PROCEDURE — 3600000005 HC OR TIME - INITIAL BASE CHARGE - PROCEDURE LEVEL FIVE: Performed by: ORTHOPAEDIC SURGERY

## 2024-05-02 PROCEDURE — 3600000010 HC OR TIME - EACH INCREMENTAL 1 MINUTE - PROCEDURE LEVEL FIVE: Performed by: ORTHOPAEDIC SURGERY

## 2024-05-02 PROCEDURE — 63047 LAM FACETEC & FORAMOT LUMBAR: CPT | Performed by: ORTHOPAEDIC SURGERY

## 2024-05-02 PROCEDURE — A63047 PR LAMINEC/FACETECT/FORAMIN,LUMBAR 1 SEG: Performed by: ANESTHESIOLOGIST ASSISTANT

## 2024-05-02 PROCEDURE — 2500000005 HC RX 250 GENERAL PHARMACY W/O HCPCS: Performed by: ORTHOPAEDIC SURGERY

## 2024-05-02 PROCEDURE — 2780000003 HC OR 278 NO HCPCS: Performed by: ORTHOPAEDIC SURGERY

## 2024-05-02 PROCEDURE — 2500000004 HC RX 250 GENERAL PHARMACY W/ HCPCS (ALT 636 FOR OP/ED): Performed by: ANESTHESIOLOGIST ASSISTANT

## 2024-05-02 PROCEDURE — 2720000007 HC OR 272 NO HCPCS: Performed by: ORTHOPAEDIC SURGERY

## 2024-05-02 PROCEDURE — 3700000001 HC GENERAL ANESTHESIA TIME - INITIAL BASE CHARGE: Performed by: ORTHOPAEDIC SURGERY

## 2024-05-02 PROCEDURE — 36415 COLL VENOUS BLD VENIPUNCTURE: CPT | Performed by: ANESTHESIOLOGIST ASSISTANT

## 2024-05-02 PROCEDURE — 3700000002 HC GENERAL ANESTHESIA TIME - EACH INCREMENTAL 1 MINUTE: Performed by: ORTHOPAEDIC SURGERY

## 2024-05-02 PROCEDURE — A63047 PR LAMINEC/FACETECT/FORAMIN,LUMBAR 1 SEG: Performed by: ANESTHESIOLOGY

## 2024-05-02 PROCEDURE — 2500000005 HC RX 250 GENERAL PHARMACY W/O HCPCS: Performed by: ANESTHESIOLOGIST ASSISTANT

## 2024-05-02 PROCEDURE — 7100000001 HC RECOVERY ROOM TIME - INITIAL BASE CHARGE: Performed by: ORTHOPAEDIC SURGERY

## 2024-05-02 PROCEDURE — 7100000002 HC RECOVERY ROOM TIME - EACH INCREMENTAL 1 MINUTE: Performed by: ORTHOPAEDIC SURGERY

## 2024-05-02 PROCEDURE — 2500000004 HC RX 250 GENERAL PHARMACY W/ HCPCS (ALT 636 FOR OP/ED): Performed by: PHYSICIAN ASSISTANT

## 2024-05-02 RX ORDER — LIDOCAINE HCL/PF 100 MG/5ML
SYRINGE (ML) INTRAVENOUS AS NEEDED
Status: DISCONTINUED | OUTPATIENT
Start: 2024-05-02 | End: 2024-05-02

## 2024-05-02 RX ORDER — DOCUSATE SODIUM 100 MG/1
100 CAPSULE, LIQUID FILLED ORAL 2 TIMES DAILY
Status: DISCONTINUED | OUTPATIENT
Start: 2024-05-02 | End: 2024-05-11 | Stop reason: HOSPADM

## 2024-05-02 RX ORDER — ONDANSETRON HYDROCHLORIDE 2 MG/ML
4 INJECTION, SOLUTION INTRAVENOUS ONCE AS NEEDED
Status: DISCONTINUED | OUTPATIENT
Start: 2024-05-02 | End: 2024-05-02 | Stop reason: HOSPADM

## 2024-05-02 RX ORDER — LABETALOL HYDROCHLORIDE 5 MG/ML
5 INJECTION, SOLUTION INTRAVENOUS ONCE AS NEEDED
Status: DISCONTINUED | OUTPATIENT
Start: 2024-05-02 | End: 2024-05-02 | Stop reason: HOSPADM

## 2024-05-02 RX ORDER — MIDAZOLAM HYDROCHLORIDE 1 MG/ML
INJECTION INTRAMUSCULAR; INTRAVENOUS AS NEEDED
Status: DISCONTINUED | OUTPATIENT
Start: 2024-05-02 | End: 2024-05-02

## 2024-05-02 RX ORDER — OXYCODONE HYDROCHLORIDE 5 MG/1
5 TABLET ORAL EVERY 4 HOURS PRN
Status: DISCONTINUED | OUTPATIENT
Start: 2024-05-02 | End: 2024-05-02 | Stop reason: HOSPADM

## 2024-05-02 RX ORDER — POLYMYXIN B 500000 [USP'U]/1
INJECTION, POWDER, LYOPHILIZED, FOR SOLUTION INTRAMUSCULAR; INTRATHECAL; INTRAVENOUS; OPHTHALMIC AS NEEDED
Status: DISCONTINUED | OUTPATIENT
Start: 2024-05-02 | End: 2024-05-02 | Stop reason: HOSPADM

## 2024-05-02 RX ORDER — DEXAMETHASONE SODIUM PHOSPHATE 100 MG/10ML
10 INJECTION INTRAMUSCULAR; INTRAVENOUS EVERY 8 HOURS SCHEDULED
Qty: 3 ML | Refills: 0 | Status: DISCONTINUED | OUTPATIENT
Start: 2024-05-02 | End: 2024-05-02 | Stop reason: SDUPTHER

## 2024-05-02 RX ORDER — ACETAMINOPHEN 325 MG/1
650 TABLET ORAL ONCE
Status: DISCONTINUED | OUTPATIENT
Start: 2024-05-02 | End: 2024-05-02 | Stop reason: HOSPADM

## 2024-05-02 RX ORDER — MEPERIDINE HYDROCHLORIDE 25 MG/ML
12.5 INJECTION INTRAMUSCULAR; INTRAVENOUS; SUBCUTANEOUS EVERY 10 MIN PRN
Status: DISCONTINUED | OUTPATIENT
Start: 2024-05-02 | End: 2024-05-02 | Stop reason: HOSPADM

## 2024-05-02 RX ORDER — DEXTROSE 50 % IN WATER (D50W) INTRAVENOUS SYRINGE
12.5
Status: DISCONTINUED | OUTPATIENT
Start: 2024-05-02 | End: 2024-05-11 | Stop reason: HOSPADM

## 2024-05-02 RX ORDER — TRANEXAMIC ACID 100 MG/ML
INJECTION, SOLUTION INTRAVENOUS AS NEEDED
Status: DISCONTINUED | OUTPATIENT
Start: 2024-05-02 | End: 2024-05-02

## 2024-05-02 RX ORDER — SODIUM CHLORIDE, SODIUM LACTATE, POTASSIUM CHLORIDE, CALCIUM CHLORIDE 600; 310; 30; 20 MG/100ML; MG/100ML; MG/100ML; MG/100ML
100 INJECTION, SOLUTION INTRAVENOUS CONTINUOUS
Status: DISCONTINUED | OUTPATIENT
Start: 2024-05-02 | End: 2024-05-02 | Stop reason: HOSPADM

## 2024-05-02 RX ORDER — HYDROMORPHONE HYDROCHLORIDE 1 MG/ML
0.2 INJECTION, SOLUTION INTRAMUSCULAR; INTRAVENOUS; SUBCUTANEOUS EVERY 5 MIN PRN
Status: DISCONTINUED | OUTPATIENT
Start: 2024-05-02 | End: 2024-05-02 | Stop reason: HOSPADM

## 2024-05-02 RX ORDER — SODIUM CHLORIDE, SODIUM LACTATE, POTASSIUM CHLORIDE, CALCIUM CHLORIDE 600; 310; 30; 20 MG/100ML; MG/100ML; MG/100ML; MG/100ML
100 INJECTION, SOLUTION INTRAVENOUS CONTINUOUS
Status: DISCONTINUED | OUTPATIENT
Start: 2024-05-02 | End: 2024-05-11 | Stop reason: HOSPADM

## 2024-05-02 RX ORDER — KETOROLAC TROMETHAMINE 30 MG/ML
INJECTION, SOLUTION INTRAMUSCULAR; INTRAVENOUS AS NEEDED
Status: DISCONTINUED | OUTPATIENT
Start: 2024-05-02 | End: 2024-05-02

## 2024-05-02 RX ORDER — GABAPENTIN 300 MG/1
300 CAPSULE ORAL EVERY 8 HOURS SCHEDULED
Status: DISCONTINUED | OUTPATIENT
Start: 2024-05-02 | End: 2024-05-11 | Stop reason: HOSPADM

## 2024-05-02 RX ORDER — POLYETHYLENE GLYCOL 3350 17 G/17G
17 POWDER, FOR SOLUTION ORAL DAILY
Status: DISCONTINUED | OUTPATIENT
Start: 2024-05-02 | End: 2024-05-04

## 2024-05-02 RX ORDER — HYDROMORPHONE HYDROCHLORIDE 1 MG/ML
INJECTION, SOLUTION INTRAMUSCULAR; INTRAVENOUS; SUBCUTANEOUS AS NEEDED
Status: DISCONTINUED | OUTPATIENT
Start: 2024-05-02 | End: 2024-05-02

## 2024-05-02 RX ORDER — CEFAZOLIN SODIUM 2 G/100ML
INJECTION, SOLUTION INTRAVENOUS
Status: DISCONTINUED
Start: 2024-05-02 | End: 2024-05-02 | Stop reason: WASHOUT

## 2024-05-02 RX ORDER — ALBUTEROL SULFATE 0.83 MG/ML
2.5 SOLUTION RESPIRATORY (INHALATION) ONCE AS NEEDED
Status: DISCONTINUED | OUTPATIENT
Start: 2024-05-02 | End: 2024-05-02 | Stop reason: HOSPADM

## 2024-05-02 RX ORDER — PROPOFOL 10 MG/ML
INJECTION, EMULSION INTRAVENOUS AS NEEDED
Status: DISCONTINUED | OUTPATIENT
Start: 2024-05-02 | End: 2024-05-02

## 2024-05-02 RX ORDER — SODIUM CHLORIDE, SODIUM LACTATE, POTASSIUM CHLORIDE, CALCIUM CHLORIDE 600; 310; 30; 20 MG/100ML; MG/100ML; MG/100ML; MG/100ML
INJECTION, SOLUTION INTRAVENOUS CONTINUOUS PRN
Status: DISCONTINUED | OUTPATIENT
Start: 2024-05-02 | End: 2024-05-02

## 2024-05-02 RX ORDER — FENTANYL CITRATE 50 UG/ML
INJECTION, SOLUTION INTRAMUSCULAR; INTRAVENOUS AS NEEDED
Status: DISCONTINUED | OUTPATIENT
Start: 2024-05-02 | End: 2024-05-02

## 2024-05-02 RX ORDER — HYDROMORPHONE HYDROCHLORIDE 1 MG/ML
0.5 INJECTION, SOLUTION INTRAMUSCULAR; INTRAVENOUS; SUBCUTANEOUS EVERY 5 MIN PRN
Status: DISCONTINUED | OUTPATIENT
Start: 2024-05-02 | End: 2024-05-02 | Stop reason: HOSPADM

## 2024-05-02 RX ORDER — SUCCINYLCHOLINE CHLORIDE 20 MG/ML
INJECTION INTRAMUSCULAR; INTRAVENOUS AS NEEDED
Status: DISCONTINUED | OUTPATIENT
Start: 2024-05-02 | End: 2024-05-02

## 2024-05-02 RX ORDER — ROCURONIUM BROMIDE 10 MG/ML
INJECTION, SOLUTION INTRAVENOUS AS NEEDED
Status: DISCONTINUED | OUTPATIENT
Start: 2024-05-02 | End: 2024-05-02

## 2024-05-02 RX ORDER — MIDAZOLAM HYDROCHLORIDE 1 MG/ML
1 INJECTION INTRAMUSCULAR; INTRAVENOUS ONCE AS NEEDED
Status: DISCONTINUED | OUTPATIENT
Start: 2024-05-02 | End: 2024-05-02 | Stop reason: HOSPADM

## 2024-05-02 RX ORDER — LIDOCAINE HYDROCHLORIDE 10 MG/ML
0.1 INJECTION INFILTRATION; PERINEURAL ONCE
Status: DISCONTINUED | OUTPATIENT
Start: 2024-05-02 | End: 2024-05-02 | Stop reason: HOSPADM

## 2024-05-02 RX ORDER — CEFAZOLIN 1 G/1
INJECTION, POWDER, FOR SOLUTION INTRAVENOUS AS NEEDED
Status: DISCONTINUED | OUTPATIENT
Start: 2024-05-02 | End: 2024-05-02

## 2024-05-02 RX ORDER — METHOCARBAMOL 500 MG/1
500 TABLET, FILM COATED ORAL 3 TIMES DAILY
Status: DISCONTINUED | OUTPATIENT
Start: 2024-05-02 | End: 2024-05-11 | Stop reason: HOSPADM

## 2024-05-02 RX ADMIN — SODIUM CHLORIDE, POTASSIUM CHLORIDE, SODIUM LACTATE AND CALCIUM CHLORIDE 80 ML/HR: 600; 310; 30; 20 INJECTION, SOLUTION INTRAVENOUS at 16:40

## 2024-05-02 RX ADMIN — PROPOFOL 50 MG: 10 INJECTION, EMULSION INTRAVENOUS at 10:50

## 2024-05-02 RX ADMIN — SODIUM CHLORIDE, POTASSIUM CHLORIDE, SODIUM LACTATE AND CALCIUM CHLORIDE 80 ML/HR: 600; 310; 30; 20 INJECTION, SOLUTION INTRAVENOUS at 20:51

## 2024-05-02 RX ADMIN — DOCUSATE SODIUM 100 MG: 100 CAPSULE, LIQUID FILLED ORAL at 14:08

## 2024-05-02 RX ADMIN — OXYCODONE HYDROCHLORIDE 10 MG: 5 TABLET ORAL at 00:00

## 2024-05-02 RX ADMIN — GABAPENTIN 300 MG: 300 CAPSULE ORAL at 21:11

## 2024-05-02 RX ADMIN — PROPOFOL 100 MG: 10 INJECTION, EMULSION INTRAVENOUS at 07:33

## 2024-05-02 RX ADMIN — DEXAMETHASONE SODIUM PHOSPHATE 10 MG: 10 INJECTION INTRAMUSCULAR; INTRAVENOUS at 02:12

## 2024-05-02 RX ADMIN — DEXAMETHASONE SODIUM PHOSPHATE 10 MG: 10 INJECTION INTRAMUSCULAR; INTRAVENOUS at 18:30

## 2024-05-02 RX ADMIN — ACETAMINOPHEN 650 MG: 325 TABLET ORAL at 18:00

## 2024-05-02 RX ADMIN — HYDROMORPHONE HYDROCHLORIDE 0.5 MG: 1 INJECTION, SOLUTION INTRAMUSCULAR; INTRAVENOUS; SUBCUTANEOUS at 09:13

## 2024-05-02 RX ADMIN — SUCCINYLCHOLINE CHLORIDE 180 MG: 20 INJECTION, SOLUTION INTRAMUSCULAR; INTRAVENOUS at 07:30

## 2024-05-02 RX ADMIN — ONDANSETRON 4 MG: 2 INJECTION INTRAMUSCULAR; INTRAVENOUS at 10:34

## 2024-05-02 RX ADMIN — ACETAMINOPHEN 650 MG: 325 TABLET ORAL at 00:00

## 2024-05-02 RX ADMIN — DOCUSATE SODIUM 100 MG: 100 CAPSULE, LIQUID FILLED ORAL at 20:57

## 2024-05-02 RX ADMIN — SUGAMMADEX 400 MG: 100 INJECTION, SOLUTION INTRAVENOUS at 10:53

## 2024-05-02 RX ADMIN — KETOROLAC TROMETHAMINE 30 MG: 30 INJECTION, SOLUTION INTRAMUSCULAR; INTRAVENOUS at 10:28

## 2024-05-02 RX ADMIN — METHOCARBAMOL 500 MG: 500 TABLET ORAL at 14:07

## 2024-05-02 RX ADMIN — METHOCARBAMOL 500 MG: 500 TABLET ORAL at 20:57

## 2024-05-02 RX ADMIN — CEFAZOLIN 3 G: 1 INJECTION, POWDER, FOR SOLUTION INTRAMUSCULAR; INTRAVENOUS at 08:05

## 2024-05-02 RX ADMIN — HYDROMORPHONE HYDROCHLORIDE 0.5 MG: 1 INJECTION, SOLUTION INTRAMUSCULAR; INTRAVENOUS; SUBCUTANEOUS at 08:35

## 2024-05-02 RX ADMIN — OXYCODONE HYDROCHLORIDE 10 MG: 5 TABLET ORAL at 20:57

## 2024-05-02 RX ADMIN — GABAPENTIN 300 MG: 300 CAPSULE ORAL at 14:07

## 2024-05-02 RX ADMIN — TRANEXAMIC ACID 1000 MG: 100 INJECTION INTRAVENOUS at 07:43

## 2024-05-02 RX ADMIN — ACETAMINOPHEN 650 MG: 325 TABLET ORAL at 05:16

## 2024-05-02 RX ADMIN — PROPOFOL 200 MG: 10 INJECTION, EMULSION INTRAVENOUS at 07:29

## 2024-05-02 RX ADMIN — PROPOFOL 50 MG: 10 INJECTION, EMULSION INTRAVENOUS at 08:32

## 2024-05-02 RX ADMIN — ROCURONIUM BROMIDE 20 MG: 10 INJECTION INTRAVENOUS at 08:32

## 2024-05-02 RX ADMIN — SODIUM CHLORIDE, POTASSIUM CHLORIDE, SODIUM LACTATE AND CALCIUM CHLORIDE: 600; 310; 30; 20 INJECTION, SOLUTION INTRAVENOUS at 07:15

## 2024-05-02 RX ADMIN — ROCURONIUM BROMIDE 60 MG: 10 INJECTION INTRAVENOUS at 07:33

## 2024-05-02 RX ADMIN — LIDOCAINE HYDROCHLORIDE 100 MG: 20 INJECTION INTRAVENOUS at 07:28

## 2024-05-02 RX ADMIN — SODIUM CHLORIDE, SODIUM LACTATE, POTASSIUM CHLORIDE, CALCIUM CHLORIDE: 600; 310; 30; 20 INJECTION, SOLUTION INTRAVENOUS at 08:00

## 2024-05-02 RX ADMIN — FENTANYL CITRATE 100 MCG: 50 INJECTION, SOLUTION INTRAMUSCULAR; INTRAVENOUS at 07:28

## 2024-05-02 RX ADMIN — MIDAZOLAM HYDROCHLORIDE 2 MG: 1 INJECTION, SOLUTION INTRAMUSCULAR; INTRAVENOUS at 07:15

## 2024-05-02 RX ADMIN — SODIUM CHLORIDE, POTASSIUM CHLORIDE, SODIUM LACTATE AND CALCIUM CHLORIDE 80 ML/HR: 600; 310; 30; 20 INJECTION, SOLUTION INTRAVENOUS at 14:20

## 2024-05-02 RX ADMIN — DEXAMETHASONE SODIUM PHOSPHATE 10 MG: 4 INJECTION INTRA-ARTICULAR; INTRALESIONAL; INTRAMUSCULAR; INTRAVENOUS; SOFT TISSUE at 07:44

## 2024-05-02 RX ADMIN — CEFAZOLIN 3 G: 10 INJECTION, POWDER, FOR SOLUTION INTRAVENOUS at 19:00

## 2024-05-02 RX ADMIN — FENTANYL CITRATE 100 MCG: 50 INJECTION, SOLUTION INTRAMUSCULAR; INTRAVENOUS at 08:32

## 2024-05-02 SDOH — ECONOMIC STABILITY: INCOME INSECURITY: IN THE LAST 12 MONTHS, WAS THERE A TIME WHEN YOU WERE NOT ABLE TO PAY THE MORTGAGE OR RENT ON TIME?: YES

## 2024-05-02 SDOH — ECONOMIC STABILITY: HOUSING INSECURITY: IN THE LAST 12 MONTHS, HOW MANY PLACES HAVE YOU LIVED?: 1

## 2024-05-02 ASSESSMENT — COGNITIVE AND FUNCTIONAL STATUS - GENERAL
STANDING UP FROM CHAIR USING ARMS: TOTAL
CLIMB 3 TO 5 STEPS WITH RAILING: TOTAL
MOVING TO AND FROM BED TO CHAIR: A LOT
MOVING FROM LYING ON BACK TO SITTING ON SIDE OF FLAT BED WITH BEDRAILS: A LOT
WALKING IN HOSPITAL ROOM: TOTAL
TURNING FROM BACK TO SIDE WHILE IN FLAT BAD: A LOT
MOBILITY SCORE: 9

## 2024-05-02 ASSESSMENT — PAIN SCALES - GENERAL
PAINLEVEL_OUTOF10: 3
PAINLEVEL_OUTOF10: 0 - NO PAIN
PAINLEVEL_OUTOF10: 0 - NO PAIN
PAINLEVEL_OUTOF10: 1
PAINLEVEL_OUTOF10: 1
PAINLEVEL_OUTOF10: 5 - MODERATE PAIN
PAINLEVEL_OUTOF10: 1
PAINLEVEL_OUTOF10: 1
PAINLEVEL_OUTOF10: 8
PAINLEVEL_OUTOF10: 0 - NO PAIN
PAINLEVEL_OUTOF10: 4

## 2024-05-02 ASSESSMENT — PAIN - FUNCTIONAL ASSESSMENT
PAIN_FUNCTIONAL_ASSESSMENT: 0-10

## 2024-05-02 NOTE — CARE PLAN
The patient's goals for the shift include rest    The clinical goals for the shift include Patient will have decrease pain by the end of the shift    Over the shift, the patient did make progress toward the following goals. Barriers to progression include n/a. Recommendations to address these barriers include n/a.

## 2024-05-02 NOTE — ANESTHESIA PROCEDURE NOTES
Peripheral IV  Date/Time: 5/2/2024 7:33 AM  Inserted by: MARCUS Gayle    Placement  Needle size: 18 G  Laterality: left  Location: hand  Local anesthetic: none  Site prep: alcohol  Technique: anatomical landmarks  Attempts: 1

## 2024-05-02 NOTE — BRIEF OP NOTE
Date: 2024  OR Location: [facility] OR    Name: Erwin Cline, : 2000, Age: 24 y.o., MRN: [id number], Sex: male    Diagnosis  Pre-op Diagnosis     * Herniated lumbar disc without myelopathy [M51.26]    L4-L5 congenital lumbar stenosis with central disc herniation causing saddle anesthesia and radicular leg pain Post-op Diagnosis     * Herniated lumbar disc without myelopathy [M51.26]    L4-L5 congenital lumbar stenosis with central disc herniation causing saddle anesthesia radicular leg pain     Procedures    L4-5 laminectomy decompression with partial facetectomy and foraminotomies in order to decompress the neural elements.    Surgeons      * Marina Grier - Primary    Resident/Fellow/Other Assistant:  Surgeons and Role:     * Rigoberto Ng MD - Assisting    Procedure Summary  Anesthesia: General  ASA: III  Anesthesia Staff: Anesthesiologist: Miguel A Burdick MD  C-AA: MARCUS Gayle  Estimated Blood Loss: 75 mL  Intra-op Medications:   Administrations occurring from 0715 to 1015 on 24:   Medication Name Total Dose   polymyxin B injection 500,000 Units   thrombin (recombinant) (Recothrom) topical solution 5,000 Units   gelatin absorbable (Gelfoam) 100 sponge 1 each              Anesthesia Record               Intraprocedure I/O Totals          Intake    Tranexamic Acid 0.00 mL    The total shown is the total volume documented since Anesthesia Start was filed.    LR infusion 1000.00 mL    Total Intake 1000 mL       Output    Urine 75 mL    Total Output 75 mL       Net    Net Volume 925 mL          Specimen: No specimens collected     Staff:   Circulator: Randy Centeno RN  Scrub Person: Mony Lim          Findings: Congenital lumbar stenosis w/ severe L4-5 central stenosis     Complications:  None; patient tolerated the procedure well.     Disposition: PACU - hemodynamically stable.  Condition: stable  Specimens Collected: No specimens collected      Rigoberto  MD Berenice  Orthopaedic Surgery, PGY-2  Available by Epic Chat  05/02/24  11:14 AM

## 2024-05-02 NOTE — PROGRESS NOTES
"Orthopaedic Spine Surgery Progress Note    S:  No acute events overnight. Questions answered about surgery today. Pain well controlled. Denies chest pain, shortness of breath, or fevers.    O:  /68 (BP Location: Left arm, Patient Position: Lying)   Pulse 84   Temp 36.4 °C (97.5 °F) (Temporal)   Resp 21   Ht 1.956 m (6' 5\")   Wt (!) 214 kg (471 lb)   SpO2 99%   BMI 55.85 kg/m²     Gen: arousable, NAD, appropriately conversational  Cardiac: RRR to peripheral palpation  Resp: nonlabored on RA  GI: soft, nondistended    MSK:    L1: SILT       L2: SILT      Hip flexors 4/5 Left; 5/5 Right  L3: SILT      Knee extension 5/5 Left; 5/5 Right  L4: SILT      Tib Ant. (Dorsiflexion) 5/5 Left; 5/5 Right  L5: SILT      EHL 5/5 Left; 5/5 Right  S1: numbness in S1 distribution     Plantarflexion 5/5 Left; 5/5 Right     Babinkski: Intact  No clonus    Labs:  No results found for this or any previous visit (from the past 24 hour(s)).    A/P: 24M (morbid obesity) presents w LBP flare up, spontaneously developed SA and urinary incontinence x1 at outside ED. [place] [facility] [place] Decadron 16mg at OSH. Endorses new LE numbness in b/l small toes. 4/5 motor b/l HF (limited by pain), otherwise 5/5, SILT throughout. No UMN signs. No incidence of urinary incontinence since transfer. Endorses reduced perianal sensation, weak rectal tone. Void trial w PVR of 15cc. MRI w L4-5 herniated disc w severe canal stenosis.      Plan:  - Admit to Ortho Spine; c/p for OR today for L4-5 lami/discectomy  - Preop labs complete  - Feeding: NPO   - Analgesia: Multimodal  - Infection: No abx indicated   - Lines: Maintain PIVx2 while inpatient  - Embolic ppx: SCDs only, hold chemo DVT ppx   - C/w home medications  - Dispo pending OR       Rigoberto Ng MD  Orthopaedic Surgery, PGY-2  Available by Epic Chat  05/02/24  5:23 AM    This patient will be followed by the Orthopaedic Spine service. Please page or Epic Chat the " corresponding residents below with questions or concerns.     Ortho Spine Service (Epic Chat Preferred)    First call: Rigoberto Ng, PGY2  Second call: Ar Edwards, PGY4     I saw and evaluated the patient.  I personally obtained the key and critical portions of the history and physical exam or was physically present for key and critical portions performed by the Resident. I reviewed the documentation and discussed the patient with the Resident.  I agree with the Resident’s medical decision making as documented in the note.    OR today

## 2024-05-02 NOTE — DISCHARGE INSTRUCTIONS
**Please call Venus Gonzáles RN (at [phone]) or Dr. Marina Grier (at [phone] for any          Postoperative spine questions or concerns.     Activity  Progressively walk 2 times a day.  Weight bearing as tolerated.  No pushing, pulling, or lifting objects greater than 10 pounds.  No excessive bending, twisting. No heavy house or yard work.  You may shower when there is no drainage from the incision site for 48 hours.  You may not return to school/work until your follow up appointment.  You may not drive until your follow up appointment or while taking narcotic medication.    Wound Type: Surgical Incision  -Change the dressing daily and continue until the incision is no longer draining.   -Once the incision  has been dry for 48 hours, you may leave the dressing off and the site open to air.  -Cover the wound with an abdominal pad and secure it with paper tape around the edges.  -If there is a Prineo/Exofin dressing (mesh strip) over your incision, do not remove it with your     dressing changes. The dressing will slowly lift away from the skin over time.   -No Lotions or Creams on or around the incision site.  -No tub soaks  -You may use heat or ice to your incision sites and or back as needed for comfort.    Call the office if:  You are breathing faster than normal.  Fever of 100.4 F or higher.  Chills  Urinating less than 4 times per day.  Acting very sleepy and difficult to awaken.  Vomiting and not able to eat or drink for 12 hours  3 or more loose, watery bowel movements in 24 hours (Diarrhea)  Concerns over the appearance of your incision.    Return immediately to the ER:  Persistent bilateral leg pain and or numbness >24 hours.  Perineal numbness/sensory loss  Urinary retention >12 hours  Loss of bowel/ bladder control

## 2024-05-02 NOTE — PROGRESS NOTES
"Orthopaedic Spine Surgery Progress Note    S:  Transferred in stable condition to PACU. Pain controlled.     O:  /78   Pulse 109   Temp 36.6 °C (97.9 °F) (Temporal)   Resp 20   Ht 1.956 m (6' 5\")   Wt (!) 214 kg (470 lb 15.9 oz)   SpO2 95%   BMI 55.85 kg/m²     Gen: arousable, NAD, appropriately conversational  Cardiac: RRR to peripheral palpation  Resp: nonlabored on RA  GI: soft, nondistended    MSK:  Drain holding suction  Provena holding suction  Moving b/l LE spontaneously    Labs:  Results for orders placed or performed during the hospital encounter of 04/30/24 (from the past 24 hour(s))   Verify ABO/Rh Group Test (VERAB)   Result Value Ref Range    ABO TYPE A     Rh TYPE POS        A/P: 24 y.o. male with lumbar congenital stenosis with L4-L5 disc herniation causing severe canal stenosis with saddle anesthesia and radicular leg pain s/p L4-5 lami on 5/2 with Dr. Grier. Doing well postop.       Plan:    - Weightbearing status: activity ad trent, highly encourage out of bed and ambulation as soon as possible, prioritize time in chair  - Precautions: no bending, twisting, lifting >10 lbs  - Imaging: none required  - Pain: multimodal regimen including Tylenol, oxycodone, Dilaudid, robaxin  - Dexamethasone 10 mg IV q8h x3 doses  - Perioperative antibiotics: Ancef 23g q8h x24 hrs  - DVT prophylaxis: SCDs, ambulation; no chemoppx  - Dressing: Provena  - Drain: HV x1, please record output q8hr  - Godoy: None  - FEN: maintenance IV fluids; HLIV with good PO intake  - Diet: clear liquid diet, advance with bowel sounds  - Pulm: encourage IS, maintain O2 sat >92%  - Bowel Regimen  - PT/OT consult  - Daily CBC, BMP     Dispo: Pending PT/OT, drain    Rigoberto Ng MD  Orthopaedic Surgery, PGY-2  Available by Epic Chat  05/02/24  11:15 AM    This patient will be followed by the Orthopaedic Spine service. Please page or Epic Chat the corresponding residents below with questions or concerns.     Ortho Spine " Service (Epic Chat Preferred)    First call: Rigoberto Ng, PGY2  Second call: Ar Edwards, PGY4      I saw and evaluated the patient.  I personally obtained the key and critical portions of the history and physical exam or was physically present for key and critical portions performed by the Resident. I reviewed the documentation and discussed the patient with the Resident.  I agree with the Resident’s medical decision making as documented in the note.    Patient reports improvement in numbness in his legs as well as the radicular leg pain.  Postop antibiotics.    PT OT, encourage patient to be out of bed and ambulating.

## 2024-05-02 NOTE — H&P
H&P reviewed. The patient was examined and there are no changes to the H&P. Patient electing to proceed with surgery. Patient marked and consented.      Victor M Marquez MD  Orthopaedic Surgery, PGY-2  EpicChat preferred

## 2024-05-02 NOTE — DISCHARGE INSTR - ACTIVITY
-Activity with assistance.  -Progressively walk 2 times a day.   -Weight bearing as tolerated.  -No pushing, pulling, or lifting objects greater than 10 pounds until follow up appointment.  -No excessive bending, twisting, or heavy house or yard work.  -You may shower once there is no drainage from your incision site for 48 hours.  -You may not drive until your follow up appointment, or while taking narcotic medication.  -You may not return to work/school until your follow appointment.

## 2024-05-02 NOTE — ANESTHESIA PROCEDURE NOTES
Airway  Date/Time: 5/2/2024 7:32 AM  Urgency: elective    Airway not difficult    Staffing  Performed: CINDI   Authorized by: Miguel A Burdick MD    Performed by: MARCUS Gayle  Patient location during procedure: OR    Indications and Patient Condition  Indications for airway management: anesthesia and airway protection  Spontaneous ventilation: present  Sedation level: deep  Preoxygenated: yes  Patient position: sniffing  MILS not maintained throughout  Mask difficulty assessment: 2 - vent by mask + OA or adjuvant +/- NMBA    Final Airway Details  Final airway type: endotracheal airway      Successful airway: ETT  Cuffed: yes   Successful intubation technique: video laryngoscopy  Facilitating devices/methods: intubating stylet  Endotracheal tube insertion site: oral  Blade: Miles  Blade size: #4  ETT size (mm): 8.0  Cormack-Lehane Classification: grade I - full view of glottis  Placement verified by: chest auscultation and capnometry   Measured from: lips  ETT to lips (cm): 25  Number of attempts at approach: 1  Ventilation between attempts: none    Additional Comments  Secured with tube tie.

## 2024-05-02 NOTE — DISCHARGE INSTR - AVS FIRST PAGE
-You are breathing faster than normal.  -Fever of 100.4 F or higher.  -Chills  -Urinating less than 4 times per day.  -Acting very sleepy and difficult to awaken.  -Vomiting and not able to eat or drink for 12 hours  -3 or more loose, watery bowel movements in 24 hours (Diarrhea)  -Concerns over the appearance of your incision.    Return immediately to the ER:  -Persistent bilateral leg pain and or numbness >24 hours.  -Perineal numbness/sensory loss  -Urinary retention >12 hours  -Loss of bowel/ bladder control

## 2024-05-02 NOTE — ANESTHESIA PREPROCEDURE EVALUATION
Patient: Erwin Cline    Procedure Information       Date/Time: 05/02/24 0715    Procedure: LAMINECTOMY, DECOMPRESSIVE, SPINE, LUMBAR, 1 LEVEL, WITH FUSION USING INSTRUMENTATION (Spine Lumbar)    Location: [facility] OR 25 / [facility] OR    Surgeons: Marina Grier MD            Relevant Problems   Anesthesia (within normal limits)      Cardiac (within normal limits)      Pulmonary (within normal limits)      Neuro (within normal limits)      GI (within normal limits)      /Renal (within normal limits)      Liver (within normal limits)      Endocrine   (+) Obesity      Hematology (within normal limits)      Musculoskeletal   (+) Herniated lumbar disc without myelopathy      HEENT (within normal limits)      ID (within normal limits)      Skin (within normal limits)      GYN (within normal limits)       Clinical information reviewed:   Tobacco  Allergies  Meds   Med Hx  Surg Hx   Fam Hx  Soc Hx        NPO Detail:  NPO/Void Status  Date of Last Liquid: 05/02/24  Time of Last Liquid: 0001  Date of Last Solid: 05/02/24  Time of Last Solid: 0001  Last Intake Type: Clear fluids         Physical Exam    Airway  Mallampati: III  TM distance: >3 FB  Neck ROM: full     Cardiovascular    Dental    Pulmonary    Abdominal            Anesthesia Plan    History of general anesthesia?: no  History of complications of general anesthesia?: no    ASA 3     general   (GA ETT 2 PIV R/B discussed.  Questions welcomed.  Pt agrees with plan.)  Anesthetic plan and risks discussed with patient.  Use of blood products discussed with patient who consented to blood products.    Plan discussed with CAA and attending.    ASA 3 secdonary to morbid obesity.    Plan general endotracheal anesthesia with peripheral IV placement and ASA standard noninvasive monitors.  The possibility of blood product transfusion was also described in detail.  Risks, benefits, alternatives of this plan were described in detail to the  patient, who indicated understanding and agreed to proceed.    Miguel A Burdick MD

## 2024-05-02 NOTE — ANESTHESIA PROCEDURE NOTES
Peripheral IV  Date/Time: 5/2/2024 7:41 AM  Inserted by: MARCUS Feliciano    Placement  Needle size: 18 G  Laterality: right  Location: hand  Local anesthetic: none  Site prep: alcohol  Technique: anatomical landmarks  Attempts: 1

## 2024-05-02 NOTE — ANESTHESIA POSTPROCEDURE EVALUATION
Patient: Erwin Cline    Procedure Summary       Date: 05/02/24 Room / Location: [facility] OR 25 / [facility] [place]    Anesthesia Start: 0715 Anesthesia Stop: 1114    Procedure: LAMINECTOMY, DECOMPRESSIVE, SPINE, LUMBAR, 1 LEVEL, WITH FUSION USING INSTRUMENTATION (Spine Lumbar) Diagnosis:       Herniated lumbar disc without myelopathy      (Herniated lumbar disc without myelopathy [M51.26])    Surgeons: Marina Grier MD Responsible Provider: Miguel A Burdick MD    Anesthesia Type: general ASA Status: 3            Anesthesia Type: general    Vitals Value Taken Time   /66 05/02/24 1114   Temp 36.6 05/02/24 1114   Pulse 100 05/02/24 1112   Resp 22 05/02/24 1112   SpO2 100 % 05/02/24 1112   Vitals shown include unfiled device data.    Anesthesia Post Evaluation    Patient location during evaluation: PACU  Patient participation: complete - patient participated  Level of consciousness: awake  Pain scale: see RN record.  Pain management: adequate  Airway patency: patent  Cardiovascular status: acceptable  Respiratory status: acceptable and face mask  Hydration status: acceptable  Postoperative Nausea and Vomiting: none        No notable events documented.

## 2024-05-02 NOTE — DISCHARGE INSTR - OTHER ORDERS
Wound Care  -Wound site: Back (Lumbar Spine)  -Wound Type: Surgical Incision  -Change the dressing daily and continue until the incision is no longer draining.          ~Once the incision  has been dry for 48 hours, you may leave the dressing off and the site open to air.  -Cover the wound with an abdominal pad and secure it with paper tape around the edges.  -If there are sutures in your incision, they will be removed at your postoperative appointment.  -No Lotions or Creams on or around the incision site.  -No tub soaks  -You may use heat or ice to your incision site as         needed for comfort.

## 2024-05-03 LAB
ANION GAP SERPL CALC-SCNC: 12 MMOL/L (ref 10–20)
BUN SERPL-MCNC: 17 MG/DL (ref 6–23)
CALCIUM SERPL-MCNC: 9 MG/DL (ref 8.6–10.6)
CHLORIDE SERPL-SCNC: 106 MMOL/L (ref 98–107)
CO2 SERPL-SCNC: 28 MMOL/L (ref 21–32)
CREAT SERPL-MCNC: 0.66 MG/DL (ref 0.5–1.3)
EGFRCR SERPLBLD CKD-EPI 2021: >90 ML/MIN/1.73M*2
ERYTHROCYTE [DISTWIDTH] IN BLOOD BY AUTOMATED COUNT: 14.5 % (ref 11.5–14.5)
GLUCOSE SERPL-MCNC: 101 MG/DL (ref 74–99)
HCT VFR BLD AUTO: 43.3 % (ref 41–52)
HGB BLD-MCNC: 13.9 G/DL (ref 13.5–17.5)
MCH RBC QN AUTO: 27.6 PG (ref 26–34)
MCHC RBC AUTO-ENTMCNC: 32.1 G/DL (ref 32–36)
MCV RBC AUTO: 86 FL (ref 80–100)
NRBC BLD-RTO: 0 /100 WBCS (ref 0–0)
PLATELET # BLD AUTO: 335 X10*3/UL (ref 150–450)
POTASSIUM SERPL-SCNC: 4 MMOL/L (ref 3.5–5.3)
RBC # BLD AUTO: 5.04 X10*6/UL (ref 4.5–5.9)
SODIUM SERPL-SCNC: 142 MMOL/L (ref 136–145)
WBC # BLD AUTO: 14.6 X10*3/UL (ref 4.4–11.3)

## 2024-05-03 PROCEDURE — 97530 THERAPEUTIC ACTIVITIES: CPT | Mod: GO

## 2024-05-03 PROCEDURE — 2500000004 HC RX 250 GENERAL PHARMACY W/ HCPCS (ALT 636 FOR OP/ED)

## 2024-05-03 PROCEDURE — 97530 THERAPEUTIC ACTIVITIES: CPT | Mod: GP | Performed by: PHYSICAL THERAPIST

## 2024-05-03 PROCEDURE — 2500000001 HC RX 250 WO HCPCS SELF ADMINISTERED DRUGS (ALT 637 FOR MEDICARE OP)

## 2024-05-03 PROCEDURE — 97165 OT EVAL LOW COMPLEX 30 MIN: CPT | Mod: GO

## 2024-05-03 PROCEDURE — 97161 PT EVAL LOW COMPLEX 20 MIN: CPT | Mod: GP | Performed by: PHYSICAL THERAPIST

## 2024-05-03 PROCEDURE — A4217 STERILE WATER/SALINE, 500 ML: HCPCS

## 2024-05-03 PROCEDURE — 97535 SELF CARE MNGMENT TRAINING: CPT | Mod: GO

## 2024-05-03 PROCEDURE — 36415 COLL VENOUS BLD VENIPUNCTURE: CPT

## 2024-05-03 PROCEDURE — 1100000001 HC PRIVATE ROOM DAILY

## 2024-05-03 PROCEDURE — 80048 BASIC METABOLIC PNL TOTAL CA: CPT

## 2024-05-03 PROCEDURE — 85027 COMPLETE CBC AUTOMATED: CPT

## 2024-05-03 RX ADMIN — ACETAMINOPHEN 650 MG: 325 TABLET ORAL at 19:24

## 2024-05-03 RX ADMIN — GABAPENTIN 300 MG: 300 CAPSULE ORAL at 05:01

## 2024-05-03 RX ADMIN — METHOCARBAMOL 500 MG: 500 TABLET ORAL at 09:54

## 2024-05-03 RX ADMIN — OXYCODONE HYDROCHLORIDE 10 MG: 5 TABLET ORAL at 05:02

## 2024-05-03 RX ADMIN — DEXAMETHASONE SODIUM PHOSPHATE 10 MG: 10 INJECTION INTRAMUSCULAR; INTRAVENOUS at 09:55

## 2024-05-03 RX ADMIN — GABAPENTIN 300 MG: 300 CAPSULE ORAL at 21:06

## 2024-05-03 RX ADMIN — DOCUSATE SODIUM 100 MG: 100 CAPSULE, LIQUID FILLED ORAL at 09:54

## 2024-05-03 RX ADMIN — METHOCARBAMOL 500 MG: 500 TABLET ORAL at 16:07

## 2024-05-03 RX ADMIN — ACETAMINOPHEN 650 MG: 325 TABLET ORAL at 13:36

## 2024-05-03 RX ADMIN — POLYETHYLENE GLYCOL 3350 17 G: 17 POWDER, FOR SOLUTION ORAL at 10:05

## 2024-05-03 RX ADMIN — GABAPENTIN 300 MG: 300 CAPSULE ORAL at 13:36

## 2024-05-03 RX ADMIN — METHOCARBAMOL 500 MG: 500 TABLET ORAL at 21:06

## 2024-05-03 RX ADMIN — ACETAMINOPHEN 650 MG: 325 TABLET ORAL at 05:01

## 2024-05-03 RX ADMIN — DEXAMETHASONE SODIUM PHOSPHATE 10 MG: 10 INJECTION INTRAMUSCULAR; INTRAVENOUS at 03:32

## 2024-05-03 RX ADMIN — DOCUSATE SODIUM 100 MG: 100 CAPSULE, LIQUID FILLED ORAL at 21:06

## 2024-05-03 RX ADMIN — OXYCODONE HYDROCHLORIDE 10 MG: 5 TABLET ORAL at 09:54

## 2024-05-03 RX ADMIN — CEFAZOLIN 3 G: 10 INJECTION, POWDER, FOR SOLUTION INTRAVENOUS at 03:00

## 2024-05-03 RX ADMIN — DEXAMETHASONE SODIUM PHOSPHATE 10 MG: 10 INJECTION INTRAMUSCULAR; INTRAVENOUS at 19:27

## 2024-05-03 ASSESSMENT — PAIN SCALES - GENERAL
PAINLEVEL_OUTOF10: 7
PAINLEVEL_OUTOF10: 7
PAINLEVEL_OUTOF10: 0 - NO PAIN
PAINLEVEL_OUTOF10: 7
PAINLEVEL_OUTOF10: 0 - NO PAIN
PAINLEVEL_OUTOF10: 7
PAINLEVEL_OUTOF10: 5 - MODERATE PAIN
PAINLEVEL_OUTOF10: 0 - NO PAIN

## 2024-05-03 ASSESSMENT — ACTIVITIES OF DAILY LIVING (ADL)
ADL_ASSISTANCE: INDEPENDENT
HOME_MANAGEMENT_TIME_ENTRY: 10
BATHING_ASSISTANCE: MAXIMAL
ADL_ASSISTANCE: INDEPENDENT

## 2024-05-03 ASSESSMENT — COGNITIVE AND FUNCTIONAL STATUS - GENERAL
MOVING FROM LYING ON BACK TO SITTING ON SIDE OF FLAT BED WITH BEDRAILS: TOTAL
MOBILITY SCORE: 6
HELP NEEDED FOR BATHING: A LOT
DRESSING REGULAR LOWER BODY CLOTHING: TOTAL
MOBILITY SCORE: 6
DAILY ACTIVITIY SCORE: 14
CLIMB 3 TO 5 STEPS WITH RAILING: TOTAL
WALKING IN HOSPITAL ROOM: TOTAL
STANDING UP FROM CHAIR USING ARMS: TOTAL
STANDING UP FROM CHAIR USING ARMS: TOTAL
DAILY ACTIVITIY SCORE: 12
MOVING TO AND FROM BED TO CHAIR: TOTAL
DRESSING REGULAR LOWER BODY CLOTHING: TOTAL
HELP NEEDED FOR BATHING: TOTAL
CLIMB 3 TO 5 STEPS WITH RAILING: TOTAL
TOILETING: TOTAL
PERSONAL GROOMING: A LITTLE
DRESSING REGULAR UPPER BODY CLOTHING: TOTAL
MOVING TO AND FROM BED TO CHAIR: TOTAL
TURNING FROM BACK TO SIDE WHILE IN FLAT BAD: TOTAL
TURNING FROM BACK TO SIDE WHILE IN FLAT BAD: TOTAL
DRESSING REGULAR UPPER BODY CLOTHING: A LITTLE
MOVING FROM LYING ON BACK TO SITTING ON SIDE OF FLAT BED WITH BEDRAILS: TOTAL
TOILETING: TOTAL
WALKING IN HOSPITAL ROOM: TOTAL

## 2024-05-03 ASSESSMENT — PAIN - FUNCTIONAL ASSESSMENT
PAIN_FUNCTIONAL_ASSESSMENT: 0-10

## 2024-05-03 ASSESSMENT — PAIN DESCRIPTION - LOCATION: LOCATION: BACK

## 2024-05-03 NOTE — PROGRESS NOTES
Physical Therapy    Physical Therapy Evaluation & Treatment    Patient Name: Erwin Cline  MRN: [id number]  Today's Date: 5/3/2024   Time Calculation  Start Time: 1003  Stop Time: 1054  Time Calculation (min): 51 min    Assessment/Plan   PT Assessment  End of Session Communication: Bedside nurse  End of Session Patient Position: Bed, 3 rail up, Alarm on   IP OR SWING BED PT PLAN  Inpatient or Swing Bed: Inpatient  PT Plan  Treatment/Interventions: Bed mobility, Transfer training, Gait training, Balance training, Strengthening, Therapeutic exercise, Endurance training, Range of motion, Therapeutic activity, Home exercise program  PT Plan: Skilled PT  PT Frequency: Daily  PT Discharge Recommendations: High intensity level of continued care  Equipment Recommended upon Discharge: Wheeled walker  PT Recommended Transfer Status: Assist x2, Assistive device  PT - OK to Discharge: Yes      Subjective     General Visit Information:  General  Reason for Referral: Herniated lumbar disc without myelopathy, L4-5 lami/discectomy  Past Medical History Relevant to Rehab: 24M (morbid obesity) presents w LBP flare up, spontaneously developed SA and urinary incontinence x1 at outside ED. History of ADHD, learning disability   Co-Treatment: OT  Co-Treatment Reason: Cotx with OT for pt's safety with mobiluty  Prior to Session Communication: Bedside nurse  Patient Position Received: Bed, 4 rail up, Alarm on  Preferred Learning Style: verbal  General Comment: Pt supine in bed upon arrival and agreeable to participate. Reports nervousness with mobiltiy, but participates fully.  Home Living:  Home Living  Type of Home:  (Lives with roommate in apartment, plans to stay at parents house upon d/c)  Home Adaptive Equipment: None  Home Layout: Multi-level, 1/2 bath on main level  Alternate Level Stairs-Number of Steps: 6 (1 handrail)  Home Access: Stairs to enter without rails  Entrance Stairs-Rails: None  Entrance Stairs-Number of  Steps: 1  Bathroom Shower/Tub: Walk-in shower  Prior Level of Function:  Prior Function Per Pt/Caregiver Report  Level of Rich: Independent with ADLs and functional transfers, Independent with homemaking with ambulation  ADL Assistance: Independent  Ambulatory Assistance: Independent  Vocational: Unemployed  Hand Dominance: Right  Prior Function Comments: -ve drives  Precautions:  Precautions  Medical Precautions: Fall precautions (denies falls)  Post-Surgical Precautions: Spinal precautions       Objective   Pain:  Pain Assessment  Pain Assessment: 0-10  Pain Score: 7  Pain Type: Surgical pain  Pain Location: Back  Cognition:  Cognition  Overall Cognitive Status: Within Functional Limits  Orientation Level: Oriented X4    General Assessments:    Sensation  Light Touch:  (numbness bilateral 4 and 5 th toes)    Strength  Strength Comments: B hip F, knee extension 2-/5 and B ankles DF 3-/5, based on mobility,  LLE weaker  Strength  Strength Comments: B hip F, knee extension 2-/5 and B ankles DF 3-/5, based on mobility,  LLE weaker       Static Sitting Balance  Static Sitting-Level of Assistance:  (SBA)  Dynamic Sitting Balance  Dynamic Sitting-Balance:  (CGA)    Static Standing Balance  Static Standing-Level of Assistance:  (MinAx2 with walker)  Dynamic Standing Balance  Dynamic Standing-Balance:  (ModAx2 with walker)  Functional Assessments:  Bed Mobility  Bed Mobility: Yes  Bed Mobility 1  Bed Mobility 1: Supine to sitting  Level of Assistance 1:  (Depx2)  Bed Mobility Comments 1: cues for log roll,HOB elevated, bed rail, draw sheet (multiple attempts to achieve sitting)  Bed Mobility 2  Bed Mobility  2: Sitting to supine  Level of Assistance 2:  (Depx2)  Bed Mobility Comments 2: cues for log roll  Bed Mobility 3  Bed Mobility 3: Rolling right  Level of Assistance 3:  (MaxAx2)    Transfers  Transfer: Yes  Transfer 1  Transfer From 1: Sit to  Transfer to 1: Stand  Technique 1: Sit to stand  Transfer Device 1:  Walker, Gait belt  Transfer Level of Assistance 1: Maximum assistance, +2, Minimal tactile cues  Trials/Comments 1: cues for safe hand placement and sequencing  Transfers 2  Transfer From 2: Stand to  Transfer to 2: Sit  Technique 2: Stand to sit  Transfer Device 2: Walker  Transfer Level of Assistance 2: Minimal verbal cues (MaxAx2)    Ambulation/Gait Training  Ambulation/Gait Training Performed: Yes  Ambulation/Gait Training 1  Surface 1: Level tile  Device 1: Rolling walker  Gait Support Devices: Gait belt  Assistance 1:  (ModAx2)  Comments/Distance (ft) 1: 6 sidesteps at EOB (cues for sequencing and walker management)    Stairs  Stairs: No         Treatments:   Bed Mobility  Bed Mobility: Yes  Bed Mobility 1  Bed Mobility 1: Supine to sitting  Level of Assistance 1:  (Depx2)  Bed Mobility Comments 1: cues for log roll,HOB elevated, bed rail, draw sheet (multiple attempts to achieve sitting)  Bed Mobility 2  Bed Mobility  2: Sitting to supine  Level of Assistance 2:  (Depx2)  Bed Mobility Comments 2: cues for log roll  Bed Mobility 3  Bed Mobility 3: Rolling right  Level of Assistance 3:  (MaxAx2)    Ambulation/Gait Training  Ambulation/Gait Training Performed: Yes  Ambulation/Gait Training 1  Surface 1: Level tile  Device 1: Rolling walker  Gait Support Devices: Gait belt  Assistance 1:  (ModAx2)  Comments/Distance (ft) 1: 6 sidesteps at EOB (cues for sequencing and walker management)  Transfers  Transfer: Yes  Transfer 1  Transfer From 1: Sit to  Transfer to 1: Stand  Technique 1: Sit to stand  Transfer Device 1: Walker, Gait belt  Transfer Level of Assistance 1: Maximum assistance, +2, Minimal tactile cues  Trials/Comments 1: cues for safe hand placement and sequencing  Transfers 2  Transfer From 2: Stand to  Transfer to 2: Sit  Technique 2: Stand to sit  Transfer Device 2: Walker  Transfer Level of Assistance 2: Minimal verbal cues (MaxAx2)      Outcome Measures:  [facility] Basic Mobility  Turning from your back  to your side while in a flat bed without using bedrails: Total  Moving from lying on your back to sitting on the side of a flat bed without using bedrails: Total  Moving to and from bed to chair (including a wheelchair): Total  Standing up from a chair using your arms (e.g. wheelchair or bedside chair): Total  To walk in hospital room: Total  Climbing 3-5 steps with railing: Total  Basic Mobility - Total Score: 6    Encounter Problems       Encounter Problems (Active)       Mobility       Pt will be Christiane for ambulation 50 ft with RW (Progressing)       Start:  05/03/24    Expected End:  05/17/24            Pt will be Christiane to ascend/descend 6 steps with 1 handrail (Progressing)       Start:  05/03/24    Expected End:  05/17/24               PT Transfers       Pt will be Christiane for sit to stand and bed to chair transfers with RW (Progressing)       Start:  05/03/24    Expected End:  05/17/24            Pt will be Christiane for bed mobility (Progressing)       Start:  05/03/24    Expected End:  05/17/24               Pain - Adult              Education Documentation  Precautions, taught by Leslie Swain PT at 5/3/2024  2:32 PM.  Learner: Patient  Readiness: Acceptance  Method: Explanation, Demonstration  Response: Verbalizes Understanding, Needs Reinforcement    Mobility Training, taught by Leslie Swain PT at 5/3/2024  2:32 PM.  Learner: Patient  Readiness: Acceptance  Method: Explanation, Demonstration  Response: Verbalizes Understanding, Needs Reinforcement    Education Comments  No comments found.

## 2024-05-03 NOTE — CARE PLAN
The patient's goals for the shift include less anxious by answering questions.    The clinical goals for the shift include Pain control    Over the shift, the patient did make progress toward the following goals. Barriers to progression include n/a. Recommendations to address these barriers include n/a.

## 2024-05-03 NOTE — PROGRESS NOTES
Occupational Therapy    Evaluation and Treatment    Patient Name: Erwin Cline  MRN: [id number]  Today's Date: 5/3/2024  [facility]  Time Calculation  Start Time: 0954  Stop Time: 1054  Time Calculation (min): 60 min    Assessment  IP OT Assessment  OT Assessment: Decreased IND in ADLs, IADLs, functional mobility & transfers. Pt would benefit from skilled OT services to address stated deficits and facilitate return to PLOF.  Prognosis: Good  Barriers to Discharge: None  Evaluation/Treatment Tolerance: Patient tolerated treatment well  End of Session Communication: Bedside nurse  End of Session Patient Position: Bed, 3 rail up, Alarm on  Plan:  Treatment Interventions: ADL retraining, Functional transfer training, UE strengthening/ROM, Endurance training, Compensatory technique education  OT Frequency: 3 times per week  OT Discharge Recommendations: High intensity level of continued care  Equipment Recommended upon Discharge:  (TBD)  OT Recommended Transfer Status: Assist of 2  OT - OK to Discharge: Yes    Subjective   Current Problem:  1. Acute midline low back pain, unspecified whether sciatica present        2. Herniated lumbar disc without myelopathy  Case Request Operating Room: LAMINECTOMY, DECOMPRESSIVE, SPINE, LUMBAR, 1 LEVEL, WITH FUSION USING INSTRUMENTATION    Case Request Operating Room: LAMINECTOMY, DECOMPRESSIVE, SPINE, LUMBAR, 1 LEVEL, WITH FUSION USING INSTRUMENTATION      3. Lumbar disc herniation          General:  Reason for Referral: Herniated lumbar disc without myelopathy, L4-5 lami/discectomy  Past Medical History Relevant to Rehab: 24M (morbid obesity) presents w LBP flare up, spontaneously developed SA and urinary incontinence x1 at outside ED.  Co-Treatment: PT  Co-Treatment Reason: cotx for patient safety with functional mobiltiy & transfer training  Prior to Session Communication: Bedside nurse  Patient Position Received: Bed, 4 rail up, Alarm on  Family/Caregiver Present:  No  General Comment: Pt lying in bed on arrival. Expresses nervousness with mobiltiy, participates fully. Extended time taken for bed mobltiy & adjust claudette  bed.   Precautions:  Medical Precautions: Fall precautions (denies falls)  Post-Surgical Precautions: Spinal precautions     Pain: 7/10 at back     Lines/Tubes/Drains:  Closed/Suction Drain Inferior;Right Back Accordion (Active)   Number of days: 1       Closed/Suction Drain Inferior;Left Back Other (Comment) (Active)   Number of days: 1     Objective   Cognition:  Overall Cognitive Status: Within Functional Limits  Orientation Level: Oriented X4           Home Living:  Type of Home:  (Pt lives with roommate in apartment however states that he will be staying at parents house when discharged home. The following information is parents home set up.)  Home Adaptive Equipment: None  Home Layout: Multi-level, 1/2 bath on main level (3 steps up to full bath; bedroom on main level)  Home Access: Stairs to enter without rails  Entrance Stairs-Number of Steps: 1  Bathroom Shower/Tub: Walk-in shower  Bathroom Toilet: Standard  Bathroom Equipment: None   Prior Function:  Level of [place]: Independent with ADLs and functional transfers, Independent with homemaking with ambulation  ADL Assistance: Independent  Homemaking Assistance: Independent  Ambulatory Assistance: Independent  Vocational: Unemployed  Leisure: video games  Hand Dominance: Right  Prior Function Comments: -drives, denies falls     ADL:  Eating Assistance: Independent  Grooming Assistance: Stand by (anticipated)  Grooming Deficit: Setup  Bathing Assistance: Maximal (anticpiated)  Bathing Deficit: Perineal area, Buttocks, Right upper leg, Left upper leg, Right lower leg including foot, Left lower leg including foot  UE Dressing Assistance: Stand by (anticipated)  LE Dressing Assistance: Total  LE Dressing Deficit: Don/doff R sock, Don/doff L sock  Toileting Assistance with Device: Total  Activity  Tolerance:  Endurance: Tolerates 10 - 20 min exercise with multiple rests  Balance:  Dynamic Sitting Balance  Dynamic Sitting-Balance Support: No upper extremity supported, Feet supported  Dynamic Sitting-Comments: CGA  Dynamic Standing Balance  Dynamic Standing-Balance Support: Bilateral upper extremity supported  Dynamic Standing-Comments: Mod Ax2  Static Sitting Balance  Static Sitting-Balance Support: No upper extremity supported, Feet supported  Static Sitting-Level of Assistance: Close supervision  Static Standing Balance  Static Standing-Balance Support: Bilateral upper extremity supported  Static Standing-Level of Assistance:  (Min Ax2)  Bed Mobility/Transfers: Bed Mobility/Transfers: Bed Mobility  Bed Mobility: Yes  Bed Mobility 1  Bed Mobility 1: Supine to sitting  Level of Assistance 1: Dependent, Moderate verbal cues, Moderate tactile cues (x2)  Bed Mobility Comments 1: cued for log roll  Bed Mobility 2  Bed Mobility  2: Sitting to supine  Level of Assistance 2: Dependent, Moderate verbal cues, Moderate tactile cues (x2)  Bed Mobility Comments 2: cued for log roll. Multiple attempts needed to achieve sit, utilized draw sheet for trunk elevation.  Functional Mobility  Functional Mobility Performed: Yes  Functional Mobility 1  Surface 1: Level tile  Device 1: Rolling walker (claudette)  Functional Mobility Support Devices: Gait belt  Assistance 1: Moderate assistance (x2)  Comments 1: Pt performed R lateral side stoes at EOB. 2rd person assist for safety   and Transfers  Transfer: Yes  Transfer 1  Transfer From 1: Sit to  Transfer to 1: Stand  Technique 1: Sit to stand  Transfer Device 1: Walker, Gait belt  Transfer Level of Assistance 1: Maximum assistance, +2 (+3rd person to stabilize walker)  Trials/Comments 1: verbal cues for fwd rocking for momentum and overall safe technique  Transfers 2  Transfer From 2: Stand to  Transfer to 2: Sit  Technique 2: Stand to sit  Transfer Device 2: Walker  Transfer Level of  Assistance 2: Maximum assistance, +2, Minimal verbal cues    Sensation:  Light Touch:  (Pt reports numbness in bilateral 4th & 5th toes)  Strength:  Strength Comments: B UEs at least 3+/5 based on functional observation    Hand Function:  Hand Function  Gross Grasp: Functional  Coordination: Functional  Extremities:   RUE   RUE : Within Functional Limits, LUE   LUE: Within Functional Limits,  , and      Outcome Measures: [facility] Daily Activity  Putting on and taking off regular lower body clothing: Total  Bathing (including washing, rinsing, drying): A lot  Putting on and taking off regular upper body clothing: A little  Toileting, which includes using toilet, bedpan or urinal: Total  Taking care of personal grooming such as brushing teeth: A little  Eating Meals: None  Daily Activity - Total Score: 14  Brief Confusion Assessment Method (bCAM)  CAM Result: CAM -      ,          Education Documentation  Body Mechanics, taught by Christal Brothers OT at 5/3/2024  2:10 PM.  Learner: Patient  Readiness: Acceptance  Method: Explanation, Demonstration  Response: Verbalizes Understanding, Needs Reinforcement    Precautions, taught by Christal Brothers OT at 5/3/2024  2:10 PM.  Learner: Patient  Readiness: Acceptance  Method: Explanation, Demonstration  Response: Verbalizes Understanding, Needs Reinforcement    ADL Training, taught by Christal Brothers OT at 5/3/2024  2:10 PM.  Learner: Patient  Readiness: Acceptance  Method: Explanation, Demonstration  Response: Verbalizes Understanding, Needs Reinforcement    Education Comments  No comments found.        Goals:   Encounter Problems       Encounter Problems (Active)       ADLs       Patient will perform UB and LB bathing  with minimal assist  level of assistance and long-handled sponge. (Progressing)       Start:  05/03/24    Expected End:  05/17/24            Patient with complete lower body dressing with minimal assist  level of assistance donning and doffing all LE clothes   with PRN adaptive equipment. (Progressing)       Start:  05/03/24    Expected End:  05/17/24            Patient will complete daily grooming tasks  with modified independent level of assistance and PRN adaptive equipment while standing. (Progressing)       Start:  05/03/24    Expected End:  05/17/24            Patient will complete toileting including hygiene clothing management/hygiene with minimal assist  level of assistance and AE/DME as needed. (Progressing)       Start:  05/03/24    Expected End:  05/17/24               BALANCE       Pt will maintain dynamic standing balance during ADL task with minimal assist  level of assistance in order to demonstrate decreased risk of falling and improved postural control. (Progressing)       Start:  05/03/24    Expected End:  05/17/24               TRANSFERS       Patient will perform bed mobility moderate assist level of assistance and bed rails in order to improve safety and independence with mobility (Progressing)       Start:  05/03/24    Expected End:  05/17/24            Patient will complete functional transfers with least restrictive device with minimal assist  level of assistance. (Progressing)       Start:  05/03/24    Expected End:  05/17/24                   Treatment Completed on Evaluation  Activities of Daily Living:          LE Dressing  LE Dressing: Yes  Sock Level of Assistance: Dependent  LE Dressing Where Assessed: Bed level    Therapy/Activity:     Therapeutic Activity  Therapeutic Activity Performed: Yes  Therapeutic Activity 1: To increase OOB activity tolerance needed ofr ADL completion, patient sat at EOB x~20 minutes with CGA-SBA.  Therapeutic Activity 2: Pt performed lateral side stepping at EOB in preparation for transfer trianing to chair using claudette FWW, mod Ax2. Cued for general sequencing and safe walker management. Cued to increase WB'ing thorugh B UEs to compensate for LE weakness.         05/03/24 at 2:15 PM   Christal Brothers, OT   Rehab  Office: [phone]

## 2024-05-03 NOTE — OP NOTE
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  We took a significant amount of time positioning the patient onto for pulse checks of bed and to ensure that all his bony prominences were well-padded.  This took multiple personnel's in order to position the patient.  Additionally due to patient's body habitus dissection took longer than usual requiring curling chronic retractors in order to expose the spine and longer instrumentation.  Longer time was necessary in order to decompress the neural elements.  As well as wound closure.  Patient also had a lot of dirt that was caked onto his skin on his back.  Significant amount of time was used to scrub the dirt off his back.    Surgeons      * Marina Grier - Primary    Resident/Fellow/Other Assistant:  Surgeons and Role:     * Rigoberto Ng MD - Assisting    Procedure Summary  Anesthesia: General  ASA: III  Anesthesia Staff: Anesthesiologist: Miguel A Burdick MD  C-AA: MARCUS Gayle  Estimated Blood Loss: 50mL  Intra-op Medications:   Administrations occurring from 0715 to 1015 on 24:   Medication Name Total Dose   polymyxin B injection 500,000 Units   thrombin (recombinant) (Recothrom) topical  solution 5,000 Units   gelatin absorbable (Gelfoam) 100 sponge 1 each              Anesthesia Record               Intraprocedure I/O Totals          Intake    Tranexamic Acid 0.00 mL    The total shown is the total volume documented since Anesthesia Start was filed.    LR infusion 1000.00 mL    Total Intake 1000 mL       Output    Urine 75 mL    Total Output 75 mL       Net    Net Volume 925 mL          Specimen: No specimens collected     Staff:   Circulator: Randy Centeno RN  Scrub Person: Mony Lim         Drains and/or Catheters:   Closed/Suction Drain Inferior;Right Back Accordion (Active)   Dressing Status Clean;Dry 05/02/24 1700   Drainage Appearance Bloody 05/02/24 1700   Status To bulb suction 05/02/24 1700   Output (mL) 50 mL 05/02/24 1700       Closed/Suction Drain Inferior;Left Back Other (Comment) (Active)   Dressing Status Clean;Dry 05/02/24 1700   Output (mL) 0 mL 05/02/24 1700       Tourniquet Times: n/a        Implants: none    Findings: Congenital lumbar stenosis with L4-L5 disc herniation    Indications: Erwin Cline is an 24 y.o. male morbidly obese with a BMI of 55.85 who presented to outside hospital with worsening low back pain and radicular leg pain and while in the outside hospital emergency room started to develop saddle anesthesia and numbness in his leg had 1 episode of urinary incontinence while in the CT scan was transferred to our hospital in order to obtain an MRI because his body habitus was too big for the outside MRI.  In our ED he was able to void voluntarily with postvoid residual 15 mL.  He was otherwise has full motors and lower extremities.  MRI demonstrating congenital lumbar stenosis with L4-L5 disc herniation causing severe canal stenosis and compressing of the neural elements.  He was not able to ambulate due to pain.  Therefore recommended surgery for L4-L5 laminectomy decompression to decompress the neural elements.  There was concern for possible  impending cauda equina syndrome. I discussed the risks of surgery which includes but not limited to infection at the surgical site or wound healing requiring additional surgery to clean the infection and long term IV antibiotics. There is risk of blood loss requiring blood transfusion. Risk of dural tear requiring repair and possible continue cerebral spinal fluid leakage and positional headaches. Risk of Nerve root injury resulting in temporary or permeant weakness, numbness, or radicular pain. Risk of epidural hematoma and spinal cord compression resulting in weakness in extremity and bowel and bladder disturbances requiring additional surgery to clear out hematoma. Risk of nonunion or hardware failure requiring additional surgery to correct this. Risk of adjacent level disease in the future requiring additional surgery in the future to address this. Risk of needing to remain intubated after surgery for facial swelling. Risk of blindness if need to be in a prone position for surgery. Other complications include skin breakdown or skin blistering from being prone for long hours, developing MI, stroke, DVT, PE during or after surgery. Risk of death. Risk of complications from anesthesia requiring prolong intubation. Risk of mercedes coronavirus and the complications associated with that while in the hospital.  The patient concurred with the proposed plan, giving informed consent.  The site of surgery was properly noted/marked if necessary per policy. The patient has been actively warmed in preoperative area. Preoperative antibiotics were held until cultures were taken. Venous thrombosis prophylaxis are not indicated.    Procedure Details: The patient was identified in the preoperative holding area. Patient was identified by  name, medical record number, and date of birth. The consent was reviewed with the patient and further questions were answered. The patient was taken to the operating room. A huddle was  performed where we verified the patient's name, medical record number, date of birth, and allergies as well as the procedure to be performed and antibiotics were held until cultures were taken. Once all parties were in agreement, general anesthesia was administered, and the patient was intubated without any complications.  Sequential compressive devices were placed in her lower extremities. Patient was then transferred to the Jim frame in the prone position with full spine precautions .  This required multiple personnel with emergency physician the patient on a narrow fourposter Jim bed.  All bony prominences were well padded.  Once in the prone position we found that patient had a lot of dirt that was caked into the skin.  Required a lot of scrubbing in order to remove the dirt around the surgical area.  The skin was then cleaned with chlorhexidine solution and alcohol.  The levels from L4-L5 was identified under fluoroscopic guidance and marked. The lumbosacral region was prepped and draped in the usual sterile fashion.     A pre-incision pause was performed to verify the patient's name, medical record number, date of birth and procedure to be performed. Once everyone was in agreement,  a midline longitudinal incision was made from L 4-5 as marked.  Exposure took longer than normal due to patient's body habitus with a longer than normal incision.  Special retractors were crawling crank were used to expose the spine.  A subperiosteal dissection was made from the spinolaminar line of what we think is L 4-5. I then verified our levels using fluoroscopy. Once the levels were identified, I then proceeded with a laminectomy.     Rongeurs were used to gently remove the lamina and spinous process off in piece meal. Using angled curette, I identified the plane between the lamina and the ligamentum falvum at L4-5. I further completed the laminectomy using kerrison rongeurs at L4. I further decompressed the spinal  canal out laterally until we reached the medial boarder of the pedicles in order to fully decompress the lateral recess. Bony medial facet overhangs and ligamentum flavum dorsal to the exiting nerve root was removed to decompress the foramina.  Patient had narrow canal with short pedicles.  I was able to pass a juan miguel elevator out the foramina easily. The central canal, lateral recess and foramen was nicely decompressed.     Wound was irrigated with normal saline.  Hemostasis was secured using gel foam with thrombin and floseal. Hemovac drain was placed. A layered closure was performed over the drain. I closed the fascia with 0 Vicryl, followed by 2-0 Vicryl and then 2-0 nylon for the skin.  A Prevena incisional wound VAC was applied over the incision to keep the incision clean given patient's body habitus and skin condition.. The patient was then transferred to the hospital bed in full spine precautions. Patient was awoken from anesthesia and extubated without any difficulties. Patient was spontaneously moving their lower and upper extremities. Patient was taken out to the postoperative holding area in stable condition.     All needles and sponges counts was correct at the time of closing. There was no immediate complication. I was present for the entire case.     POST OPERATIVE PLAN: Patient will be admitted for perioperative antibiotics and drain care. Patient will work with PT and OT while in house. Hemovac drain will be removed prior to discharge. Patient was instructed to avoid lifting, pushing, pulling more than 10lbs and any strenuous activities. Patient will follow up in 3 weeks for wound check.       Complications:  None; patient tolerated the procedure well.    Disposition: PACU - hemodynamically stable.  Condition: stable       Attending Attestation: I was present and scrubbed for the entire procedure.    Marina Grier  Phone Number: [phone]

## 2024-05-03 NOTE — CARE PLAN
The patient's goals for the shift include less anxious    The clinical goals for the shift include Sit on side of bed.    Problem: Pain - Adult  Goal: Verbalizes/displays adequate comfort level or baseline comfort level  Outcome: Progressing     Problem: Safety - Adult  Goal: Free from fall injury  Outcome: Progressing     Problem: Discharge Planning  Goal: Discharge to home or other facility with appropriate resources  Outcome: Progressing     Problem: Chronic Conditions and Co-morbidities  Goal: Patient's chronic conditions and co-morbidity symptoms are monitored and maintained or improved  Outcome: Progressing     Problem: Skin  Goal: Decreased wound size/increased tissue granulation at next dressing change  Outcome: Progressing  Flowsheets (Taken 5/2/2024 1741 by Laly Ziegler RN)  Decreased wound size/increased tissue granulation at next dressing change: Utilize specialty bed per algorithm  Goal: Participates in plan/prevention/treatment measures  Outcome: Progressing  Goal: Prevent/manage excess moisture  Outcome: Progressing  Goal: Prevent/minimize sheer/friction injuries  Outcome: Progressing  Goal: Promote/optimize nutrition  Outcome: Progressing  Goal: Promote skin healing  Outcome: Progressing

## 2024-05-03 NOTE — PROGRESS NOTES
"Orthopaedic Spine Surgery Progress Note    S:  ROMA. Doing well, though anxious about next steps after surgery.     O:  /76 (BP Location: Left arm, Patient Position: Lying)   Pulse 73   Temp 36.1 °C (97 °F) (Temporal)   Resp 17   Ht 1.956 m (6' 5\")   Wt (!) 214 kg (470 lb 15.9 oz)   SpO2 95%   BMI 55.85 kg/m²     Gen: arousable, NAD, appropriately conversational  Cardiac: RRR to peripheral palpation  Resp: nonlabored on RA  GI: soft, nondistended    MSK:    Spine Exam:  Drain in place holding suction, serosanginous output  Provena holding suction    L1: SILT       L2: SILT      Hip flexors 5/5 Left; 5/5 Right  L3: SILT      Knee extension 5/5 Left; 5/5 Right  L4: SILT      Tib Ant. (Dorsiflexion) 5/5 Left; 5/5 Right  L5: SILT      EHL 5/5 Left; 5/5 Right  S1: SILT      Plantarflexion 5/5 Left; 5/5 Right    Patellar reflex: 2+   Bilaterally    Babinkski: Intact  No clonus    Labs:  Results for orders placed or performed during the hospital encounter of 04/30/24 (from the past 24 hour(s))   Verify ABO/Rh Group Test (VERAB)   Result Value Ref Range    ABO TYPE A     Rh TYPE POS        A/P: 24 y.o. male with severe stenosis at L4-5 from congenital lumbar stenosis and disc herniation s/p L4-5 lami on 5/2 with Dr. Grier. Doing well postop.       Plan:    - Weightbearing status: activity ad trent, highly encourage out of bed and ambulation as soon as possible, prioritize time in chair. Avoid lying on incision. Need to offload incision  - Precautions: no bending, twisting, lifting >10 lbs  - Imaging: none required  - Pain: multimodal regimen including Tylenol, oxycodone, Dilaudid, robaxin  - Dexamethasone 10 mg IV q8h x3 doses  - Perioperative antibiotics: Ancef 23g q8h x24 hrs  - DVT prophylaxis: SCDs, ambulation; no chemoppx  - Dressing: Provena  - Drain: HV x1, please record output q8hr - will maintain  - Godoy: None  - FEN: maintenance IV fluids; HLIV with good PO intake  - Diet: clear liquid diet, advance " with bowel sounds  - Pulm: encourage IS, maintain O2 sat >92%  - Bowel Regimen  - PT/OT consult  - Daily CBC, BMP     Dispo: Pending PT/OT, drain    Rigoberto Ng MD  Orthopaedic Surgery, PGY-2  Available by Epic Chat  05/03/24  7:33 AM    This patient will be followed by the Orthopaedic Spine service. Please page or Epic Chat the corresponding residents below with questions or concerns.     Ortho Spine Service ([employer] Chat Preferred)    First call: Rigoberto Ng, PGY2  Second call: Ar Edwards, PGY4      I saw and evaluated the patient.  I personally obtained the key and critical portions of the history and physical exam or was physically present for key and critical portions performed by the Resident. I reviewed the documentation and discussed the patient with the Resident.  I agree with the Resident’s medical decision making as documented in the note.    Radicular leg pain and numbness improved.     PT/OT. Needs to be out of bed when possible to off load incision  Continue drain

## 2024-05-03 NOTE — CARE PLAN
The patient's goals for the shift include less anxious    The clinical goals for the shift inc  Problem: Skin  Goal: Decreased wound size/increased tissue granulation at next dressing change  5/3/2024 1553 by Kaylie Carver RN  Outcome: Progressing  Flowsheets (Taken 5/2/2024 1741 by Laly Ziegler RN)  Decreased wound size/increased tissue granulation at next dressing change: Utilize specialty bed per algorithm  5/3/2024 1520 by Kaylie Carver RN  Outcome: Progressing  Flowsheets (Taken 5/2/2024 1741 by Laly Ziegler RN)  Decreased wound size/increased tissue granulation at next dressing change: Utilize specialty bed per algorithm  Goal: Participates in plan/prevention/treatment measures  5/3/2024 1553 by Kaylie Carver RN  Outcome: Progressing  5/3/2024 1520 by Kaylie Carver RN  Outcome: Progressing  Goal: Prevent/manage excess moisture  5/3/2024 1553 by Kaylie Carver RN  Outcome: Progressing  5/3/2024 1520 by Kaylie Carver RN  Outcome: Progressing  Goal: Prevent/minimize sheer/friction injuries  5/3/2024 1553 by Kaylie Carver RN  Outcome: Progressing  Flowsheets (Taken 5/3/2024 1553)  Prevent/minimize sheer/friction injuries: Complete micro-shifts as needed if patient unable. Adjust patient position to relieve pressure points, not a full turn  5/3/2024 1520 by Kaylie Carver RN  Outcome: Progressing  Goal: Promote/optimize nutrition  5/3/2024 1553 by Kaylie Carver RN  Outcome: Progressing  Flowsheets (Taken 5/3/2024 1553)  Promote/optimize nutrition: Monitor/record intake including meals  5/3/2024 1520 by Kaylie Carver RN  Outcome: Progressing  Goal: Promote skin healing  5/3/2024 1553 by Kaylie Carver RN  Outcome: Progressing  Flowsheets (Taken 5/3/2024 1553)  Promote skin healing: Assess skin/pad under line(s)/device(s)  5/3/2024 1520 by Kaylie Carver RN  Outcome: Progressing   lude Sit on side of bed.

## 2024-05-04 ENCOUNTER — APPOINTMENT (OUTPATIENT)
Dept: RADIOLOGY | Facility: HOSPITAL | Age: 24
DRG: 519 | End: 2024-05-04
Payer: COMMERCIAL

## 2024-05-04 LAB
ANION GAP SERPL CALC-SCNC: 15 MMOL/L (ref 10–20)
ATRIAL RATE: 94 BPM
BUN SERPL-MCNC: 16 MG/DL (ref 6–23)
CALCIUM SERPL-MCNC: 9 MG/DL (ref 8.6–10.6)
CHLORIDE SERPL-SCNC: 103 MMOL/L (ref 98–107)
CO2 SERPL-SCNC: 24 MMOL/L (ref 21–32)
CREAT SERPL-MCNC: 0.65 MG/DL (ref 0.5–1.3)
EGFRCR SERPLBLD CKD-EPI 2021: >90 ML/MIN/1.73M*2
ERYTHROCYTE [DISTWIDTH] IN BLOOD BY AUTOMATED COUNT: 14.3 % (ref 11.5–14.5)
GLUCOSE SERPL-MCNC: 102 MG/DL (ref 74–99)
HCT VFR BLD AUTO: 45.4 % (ref 41–52)
HGB BLD-MCNC: 14.7 G/DL (ref 13.5–17.5)
MCH RBC QN AUTO: 27.7 PG (ref 26–34)
MCHC RBC AUTO-ENTMCNC: 32.4 G/DL (ref 32–36)
MCV RBC AUTO: 86 FL (ref 80–100)
NRBC BLD-RTO: 0.3 /100 WBCS (ref 0–0)
P AXIS: 75 DEGREES
P OFFSET: 199 MS
P ONSET: 141 MS
PLATELET # BLD AUTO: 351 X10*3/UL (ref 150–450)
POTASSIUM SERPL-SCNC: 4.2 MMOL/L (ref 3.5–5.3)
PR INTERVAL: 150 MS
Q ONSET: 216 MS
QRS COUNT: 15 BEATS
QRS DURATION: 90 MS
QT INTERVAL: 352 MS
QTC CALCULATION(BAZETT): 440 MS
QTC FREDERICIA: 408 MS
R AXIS: 55 DEGREES
RBC # BLD AUTO: 5.3 X10*6/UL (ref 4.5–5.9)
SODIUM SERPL-SCNC: 138 MMOL/L (ref 136–145)
T AXIS: 42 DEGREES
T OFFSET: 392 MS
VENTRICULAR RATE: 94 BPM
WBC # BLD AUTO: 15.6 X10*3/UL (ref 4.4–11.3)

## 2024-05-04 PROCEDURE — 2500000001 HC RX 250 WO HCPCS SELF ADMINISTERED DRUGS (ALT 637 FOR MEDICARE OP)

## 2024-05-04 PROCEDURE — 74018 RADEX ABDOMEN 1 VIEW: CPT

## 2024-05-04 PROCEDURE — 2500000004 HC RX 250 GENERAL PHARMACY W/ HCPCS (ALT 636 FOR OP/ED)

## 2024-05-04 PROCEDURE — 97116 GAIT TRAINING THERAPY: CPT | Mod: GP,CQ

## 2024-05-04 PROCEDURE — 80048 BASIC METABOLIC PNL TOTAL CA: CPT

## 2024-05-04 PROCEDURE — 97530 THERAPEUTIC ACTIVITIES: CPT | Mod: GP,CQ

## 2024-05-04 PROCEDURE — 51701 INSERT BLADDER CATHETER: CPT

## 2024-05-04 PROCEDURE — 36415 COLL VENOUS BLD VENIPUNCTURE: CPT

## 2024-05-04 PROCEDURE — 85027 COMPLETE CBC AUTOMATED: CPT

## 2024-05-04 PROCEDURE — 74018 RADEX ABDOMEN 1 VIEW: CPT | Performed by: RADIOLOGY

## 2024-05-04 PROCEDURE — 1100000001 HC PRIVATE ROOM DAILY

## 2024-05-04 RX ORDER — POLYETHYLENE GLYCOL 3350 17 G/17G
17 POWDER, FOR SOLUTION ORAL EVERY 12 HOURS
Status: DISCONTINUED | OUTPATIENT
Start: 2024-05-04 | End: 2024-05-11 | Stop reason: HOSPADM

## 2024-05-04 RX ORDER — SIMETHICONE 80 MG
40 TABLET,CHEWABLE ORAL 4 TIMES DAILY PRN
Status: DISCONTINUED | OUTPATIENT
Start: 2024-05-04 | End: 2024-05-11 | Stop reason: HOSPADM

## 2024-05-04 RX ADMIN — METHOCARBAMOL 500 MG: 500 TABLET ORAL at 09:01

## 2024-05-04 RX ADMIN — SIMETHICONE 40 MG: 80 TABLET, CHEWABLE ORAL at 14:51

## 2024-05-04 RX ADMIN — DOCUSATE SODIUM 100 MG: 100 CAPSULE, LIQUID FILLED ORAL at 09:01

## 2024-05-04 RX ADMIN — SODIUM CHLORIDE, POTASSIUM CHLORIDE, SODIUM LACTATE AND CALCIUM CHLORIDE 100 ML/HR: 600; 310; 30; 20 INJECTION, SOLUTION INTRAVENOUS at 21:34

## 2024-05-04 RX ADMIN — POLYETHYLENE GLYCOL 3350 17 G: 17 POWDER, FOR SOLUTION ORAL at 09:01

## 2024-05-04 RX ADMIN — ACETAMINOPHEN 650 MG: 325 TABLET ORAL at 12:38

## 2024-05-04 RX ADMIN — DOCUSATE SODIUM 100 MG: 100 CAPSULE, LIQUID FILLED ORAL at 21:30

## 2024-05-04 RX ADMIN — METHOCARBAMOL 500 MG: 500 TABLET ORAL at 14:51

## 2024-05-04 RX ADMIN — GABAPENTIN 300 MG: 300 CAPSULE ORAL at 21:30

## 2024-05-04 RX ADMIN — GABAPENTIN 300 MG: 300 CAPSULE ORAL at 14:11

## 2024-05-04 RX ADMIN — OXYCODONE HYDROCHLORIDE 5 MG: 5 TABLET ORAL at 04:04

## 2024-05-04 RX ADMIN — ACETAMINOPHEN 650 MG: 325 TABLET ORAL at 18:05

## 2024-05-04 RX ADMIN — DEXAMETHASONE SODIUM PHOSPHATE 10 MG: 10 INJECTION INTRAMUSCULAR; INTRAVENOUS at 10:26

## 2024-05-04 RX ADMIN — POLYETHYLENE GLYCOL 3350 17 G: 17 POWDER, FOR SOLUTION ORAL at 21:30

## 2024-05-04 RX ADMIN — ACETAMINOPHEN 650 MG: 325 TABLET ORAL at 00:00

## 2024-05-04 RX ADMIN — DEXAMETHASONE SODIUM PHOSPHATE 10 MG: 10 INJECTION INTRAMUSCULAR; INTRAVENOUS at 18:07

## 2024-05-04 RX ADMIN — METHOCARBAMOL 500 MG: 500 TABLET ORAL at 21:30

## 2024-05-04 RX ADMIN — DEXAMETHASONE SODIUM PHOSPHATE 10 MG: 10 INJECTION INTRAMUSCULAR; INTRAVENOUS at 03:34

## 2024-05-04 RX ADMIN — ACETAMINOPHEN 650 MG: 325 TABLET ORAL at 05:40

## 2024-05-04 RX ADMIN — GABAPENTIN 300 MG: 300 CAPSULE ORAL at 05:40

## 2024-05-04 ASSESSMENT — COGNITIVE AND FUNCTIONAL STATUS - GENERAL
MOBILITY SCORE: 9
TURNING FROM BACK TO SIDE WHILE IN FLAT BAD: TOTAL
MOVING FROM LYING ON BACK TO SITTING ON SIDE OF FLAT BED WITH BEDRAILS: TOTAL
MOVING TO AND FROM BED TO CHAIR: A LOT
CLIMB 3 TO 5 STEPS WITH RAILING: TOTAL
WALKING IN HOSPITAL ROOM: A LOT
STANDING UP FROM CHAIR USING ARMS: A LOT

## 2024-05-04 ASSESSMENT — PAIN - FUNCTIONAL ASSESSMENT
PAIN_FUNCTIONAL_ASSESSMENT: 0-10

## 2024-05-04 ASSESSMENT — PAIN SCALES - GENERAL
PAINLEVEL_OUTOF10: 0 - NO PAIN
PAINLEVEL_OUTOF10: 6
PAINLEVEL_OUTOF10: 0 - NO PAIN
PAINLEVEL_OUTOF10: 3

## 2024-05-04 NOTE — CARE PLAN
The patient's goals for the shift include getting into the chair  Problem: Pain - Adult  Goal: Verbalizes/displays adequate comfort level or baseline comfort level  Outcome: Progressing     Problem: Safety - Adult  Goal: Free from fall injury  Outcome: Progressing     Problem: Discharge Planning  Goal: Discharge to home or other facility with appropriate resources  Outcome: Progressing     Problem: Chronic Conditions and Co-morbidities  Goal: Patient's chronic conditions and co-morbidity symptoms are monitored and maintained or improved  Outcome: Progressing     Problem: Skin  Goal: Decreased wound size/increased tissue granulation at next dressing change  Outcome: Progressing  Goal: Participates in plan/prevention/treatment measures  Outcome: Progressing  Goal: Prevent/manage excess moisture  Outcome: Progressing  Goal: Prevent/minimize sheer/friction injuries  Outcome: Progressing  Goal: Promote/optimize nutrition  Outcome: Progressing  Goal: Promote skin healing  Outcome: Progressing     Problem: Pain  Goal: Takes deep breaths with improved pain control throughout the shift  Outcome: Progressing  Goal: Turns in bed with improved pain control throughout the shift  Outcome: Progressing  Goal: Walks with improved pain control throughout the shift  Outcome: Progressing  Goal: Performs ADL's with improved pain control throughout shift  Outcome: Progressing  Goal: Participates in PT with improved pain control throughout the shift  Outcome: Progressing  Goal: Free from opioid side effects throughout the shift  Outcome: Progressing  Goal: Free from acute confusion related to pain meds throughout the shift  Outcome: Progressing       The clinical goals for the shift include increasing mobility

## 2024-05-04 NOTE — PROGRESS NOTES
5/4/24 @0119 Transitional Care Coordinator Note  Per team they will pull his drain today and per team pt needs to move and work with PT, I requested PT/OT. [facility] vs [facility]. Will continue to follow.

## 2024-05-04 NOTE — CARE PLAN
The patient's goals for the shift include less anxious    The clinical goals for the shift include Sit on side of bed.    Over the shift, the patient did not make progress toward the following goals. Barriers to progression include . Recommendations to address these barriers include .

## 2024-05-04 NOTE — PROGRESS NOTES
"Orthopaedic Spine Surgery Progress Note    S:  ROMA. Pain controlled. Worked with PT yesterday, made it a couple steps. Will work hard today to make progress. Denies any CP, SOB, N/V.     O:  /71   Pulse 70   Temp 36.1 °C (97 °F) (Tympanic)   Resp 16   Ht 1.956 m (6' 5\")   Wt (!) 214 kg (470 lb 15.9 oz)   SpO2 96% Comment: room air  BMI 55.85 kg/m²     Gen: arousable, NAD, appropriately conversational  Cardiac: RRR to peripheral palpation  Resp: nonlabored on RA  GI: soft, nondistended    MSK:    Spine Exam:  Drain in place holding suction, serosanginous output  Provena holding suction    L1: SILT       L2: SILT      Hip flexors 5/5 Left; 5/5 Right  L3: SILT      Knee extension 5/5 Left; 5/5 Right  L4: SILT      Tib Ant. (Dorsiflexion) 5/5 Left; 5/5 Right  L5: SILT      EHL 5/5 Left; 5/5 Right  S1: SILT      Plantarflexion 5/5 Left; 5/5 Right    Patellar reflex: 2+   Bilaterally    Babinkski: Intact  No clonus    Labs:  No results found for this or any previous visit (from the past 24 hour(s)).      A/P: 24 y.o. male with severe stenosis at L4-5 from congenital lumbar stenosis and disc herniation s/p L4-5 lami on 5/2 with Dr. Grier. Doing well postop.       Plan:    - Weightbearing status: activity ad trent, highly encourage out of bed and ambulation as soon as possible, prioritize time in chair. Avoid lying on incision. Need to offload incision  - Precautions: no bending, twisting, lifting >10 lbs  - Imaging: none required  - Pain: multimodal regimen including Tylenol, oxycodone, Dilaudid, robaxin  - Dexamethasone 10 mg IV q8h x3 doses  - Perioperative antibiotics: Ancef 23g q8h x24 hrs  - DVT prophylaxis: SCDs, ambulation; no chemoppx  - Dressing: Provena  - Drain: HV x1, please record output q8hr - DC today  - Godoy: None  - FEN: maintenance IV fluids; HLIV with good PO intake  - Diet: clear liquid diet, advance with bowel sounds  - Pulm: encourage IS, maintain O2 sat >92%  - Bowel Regimen  - PT/OT " consult  - Daily CBC, BMP     Dispo: Pending PT/OT, drain    Rigoberto Ng MD  Orthopaedic Surgery, PGY-2  Available by Epic Chat  05/04/24  8:12 AM    This patient will be followed by the Orthopaedic Spine service. Please page or Epic Chat the corresponding residents below with questions or concerns.     Ortho Spine Service ([employer] Chat Preferred)    First call: Rigoberto Ng, PGY2  Second call: Ar Edwards, PGY4      I saw and evaluated the patient.  I personally obtained the key and critical portions of the history and physical exam or was physically present for key and critical portions performed by the Resident. I reviewed the documentation and discussed the patient with the Resident.  I agree with the Resident’s medical decision making as documented in the note.    discontinue drain.  Keep wound vac   Encourage patient to be out of bed and walk to the bathroom

## 2024-05-04 NOTE — PROGRESS NOTES
Physical Therapy    Physical Therapy Treatment    Patient Name: Erwin Cline  MRN: [id number]  Today's Date: 5/4/2024  Time Calculation  Start Time: 1115  Stop Time: 1153  Time Calculation (min): 38 min       Assessment/Plan   PT Assessment  End of Session Communication: Bedside nurse  Assessment Comment: pt showed increased tolerance to upright activites on this date. pt remains appropriate for HIGH intensity PT upon D/C to return to PLOF.  End of Session Patient Position: Bed, 3 rail up, Alarm on  PT Plan  Inpatient/Swing Bed or Outpatient: Inpatient  PT Plan  Treatment/Interventions: Bed mobility, Transfer training, Gait training, Balance training, Strengthening, Therapeutic exercise, Endurance training, Range of motion, Therapeutic activity, Home exercise program  PT Plan: Skilled PT  PT Frequency: Daily  PT Discharge Recommendations: High intensity level of continued care  Equipment Recommended upon Discharge: Wheeled walker  PT Recommended Transfer Status: Assist x2, Assistive device  PT - OK to Discharge: Yes      General Visit Information:   PT  Visit  PT Received On: 05/04/24  Response to Previous Treatment: Patient with no complaints from previous session.  General  Reason for Referral: Herniated lumbar disc without myelopathy, L4-5 lami/discectomy  Past Medical History Relevant to Rehab: 24M (morbid obesity) presents w LBP flare up, spontaneously developed SA and urinary incontinence x1 at outside ED.  History of ADHD, learning disability  Prior to Session Communication: Bedside nurse  Patient Position Received: Bed, 3 rail up, Alarm on  Preferred Learning Style: verbal  General Comment: Pt supine in bed upon arrival and agreeable to participate. Reports nervousness with mobiltiy, but participates fully.    Subjective   Precautions:  Precautions  Medical Precautions: Fall precautions  Post-Surgical Precautions: Spinal precautions     Objective   Pain:  Pain Assessment  Pain Assessment: 0-10  Pain  Score: 0 - No pain (no pain however noted numbness in 5th toe of each foot.)  Pain Location: Foot  Pain Interventions: Medication (See MAR), Repositioned, Ambulation/increased activity  Response to Interventions: no change  Cognition:  Cognition  Overall Cognitive Status: Within Functional Limits  Orientation Level: Oriented X4  Postural Control:  Static Sitting Balance  Static Sitting-Balance Support: Bilateral upper extremity supported, Feet supported  Static Sitting-Level of Assistance: Contact guard  Static Sitting-Comment/Number of Minutes: 10 minutes  Dynamic Sitting Balance  Dynamic Sitting-Balance Support: Feet supported  Dynamic Sitting-Balance: Forward lean  Dynamic Sitting-Comments: CGA  Static Standing Balance  Static Standing-Balance Support: Bilateral upper extremity supported  Static Standing-Level of Assistance: Minimum assistance (x2 with FWW)  Static Standing-Comment/Number of Minutes: 2 minutes    Activity Tolerance:  Activity Tolerance  Endurance: Tolerates 10 - 20 min exercise with multiple rests  Treatments:     Therapeutic Activity  Therapeutic Activity Performed: Yes    Bed Mobility  Bed Mobility: Yes  Bed Mobility 1  Bed Mobility 1: Supine to sitting  Level of Assistance 1: Dependent, Moderate verbal cues, Moderate tactile cues (x2)  Bed Mobility Comments 1: cues for log roll,HOB elevated, bed rail, draw sheet  Bed Mobility 2  Bed Mobility  2: Sitting to supine  Level of Assistance 2: Dependent, Moderate verbal cues, Moderate tactile cues (x2)  Bed Mobility Comments 2: cues for log roll    Ambulation/Gait Training  Ambulation/Gait Training Performed: Yes  Ambulation/Gait Training 1  Surface 1: Level tile  Device 1: Rolling walker  Assistance 1: Maximum assistance (x2)  Comments/Distance (ft) 1: 3 steps forward and retro, 6 side steps to HOB. (increased fatigue post ambulation)  Transfers  Transfer: Yes  Transfer 1  Transfer From 1: Sit to  Transfer to 1: Stand  Technique 1: Sit to  stand  Transfer Device 1: Walker  Transfer Level of Assistance 1: Maximum assistance, +2, Minimal tactile cues  Transfers 2  Transfer From 2: Stand to  Transfer to 2: Sit  Technique 2: Stand to sit  Transfer Device 2: Walker  Transfer Level of Assistance 2: Maximum assistance, +2    Outcome Measures:  [facility] Basic Mobility  Turning from your back to your side while in a flat bed without using bedrails: Total  Moving from lying on your back to sitting on the side of a flat bed without using bedrails: Total  Moving to and from bed to chair (including a wheelchair): A lot  Standing up from a chair using your arms (e.g. wheelchair or bedside chair): A lot  To walk in hospital room: A lot  Climbing 3-5 steps with railing: Total  Basic Mobility - Total Score: 9    Education Documentation  Precautions, taught by Selvin Gomez PTA at 5/4/2024 12:28 PM.  Learner: Patient  Readiness: Acceptance  Method: Explanation  Response: Verbalizes Understanding    Mobility Training, taught by Selvin Gomez PTA at 5/4/2024 12:28 PM.  Learner: Patient  Readiness: Acceptance  Method: Explanation  Response: Verbalizes Understanding    Education Comments  No comments found.           Encounter Problems       Encounter Problems (Active)       Mobility       Pt will be Christiane for ambulation 50 ft with RW (Progressing)       Start:  05/03/24    Expected End:  05/17/24            Pt will be Christiane to ascend/descend 6 steps with 1 handrail (Progressing)       Start:  05/03/24    Expected End:  05/17/24               PT Transfers       Pt will be Christiane for sit to stand and bed to chair transfers with RW (Progressing)       Start:  05/03/24    Expected End:  05/17/24            Pt will be Christiane for bed mobility (Progressing)       Start:  05/03/24    Expected End:  05/17/24               Pain - Adult

## 2024-05-05 LAB
ANION GAP SERPL CALC-SCNC: 16 MMOL/L (ref 10–20)
BUN SERPL-MCNC: 18 MG/DL (ref 6–23)
CALCIUM SERPL-MCNC: 8.8 MG/DL (ref 8.6–10.6)
CHLORIDE SERPL-SCNC: 102 MMOL/L (ref 98–107)
CO2 SERPL-SCNC: 24 MMOL/L (ref 21–32)
CREAT SERPL-MCNC: 0.54 MG/DL (ref 0.5–1.3)
EGFRCR SERPLBLD CKD-EPI 2021: >90 ML/MIN/1.73M*2
ERYTHROCYTE [DISTWIDTH] IN BLOOD BY AUTOMATED COUNT: 14 % (ref 11.5–14.5)
GLUCOSE SERPL-MCNC: 84 MG/DL (ref 74–99)
HCT VFR BLD AUTO: 44.1 % (ref 41–52)
HGB BLD-MCNC: 14.5 G/DL (ref 13.5–17.5)
MCH RBC QN AUTO: 27.6 PG (ref 26–34)
MCHC RBC AUTO-ENTMCNC: 32.9 G/DL (ref 32–36)
MCV RBC AUTO: 84 FL (ref 80–100)
NRBC BLD-RTO: 0 /100 WBCS (ref 0–0)
PLATELET # BLD AUTO: 374 X10*3/UL (ref 150–450)
POTASSIUM SERPL-SCNC: 4 MMOL/L (ref 3.5–5.3)
RBC # BLD AUTO: 5.25 X10*6/UL (ref 4.5–5.9)
SODIUM SERPL-SCNC: 138 MMOL/L (ref 136–145)
WBC # BLD AUTO: 16.2 X10*3/UL (ref 4.4–11.3)

## 2024-05-05 PROCEDURE — 2500000004 HC RX 250 GENERAL PHARMACY W/ HCPCS (ALT 636 FOR OP/ED)

## 2024-05-05 PROCEDURE — 36415 COLL VENOUS BLD VENIPUNCTURE: CPT

## 2024-05-05 PROCEDURE — 85027 COMPLETE CBC AUTOMATED: CPT

## 2024-05-05 PROCEDURE — 2500000001 HC RX 250 WO HCPCS SELF ADMINISTERED DRUGS (ALT 637 FOR MEDICARE OP)

## 2024-05-05 PROCEDURE — 97530 THERAPEUTIC ACTIVITIES: CPT | Mod: GP,CQ

## 2024-05-05 PROCEDURE — 1100000001 HC PRIVATE ROOM DAILY

## 2024-05-05 PROCEDURE — 97535 SELF CARE MNGMENT TRAINING: CPT | Mod: GO | Performed by: OCCUPATIONAL THERAPIST

## 2024-05-05 PROCEDURE — 97116 GAIT TRAINING THERAPY: CPT | Mod: GP,CQ

## 2024-05-05 PROCEDURE — 82374 ASSAY BLOOD CARBON DIOXIDE: CPT

## 2024-05-05 PROCEDURE — 97110 THERAPEUTIC EXERCISES: CPT | Mod: GP,CQ

## 2024-05-05 RX ADMIN — METHOCARBAMOL 500 MG: 500 TABLET ORAL at 20:10

## 2024-05-05 RX ADMIN — GABAPENTIN 300 MG: 300 CAPSULE ORAL at 23:16

## 2024-05-05 RX ADMIN — ACETAMINOPHEN 650 MG: 325 TABLET ORAL at 23:16

## 2024-05-05 RX ADMIN — GABAPENTIN 300 MG: 300 CAPSULE ORAL at 14:07

## 2024-05-05 RX ADMIN — ACETAMINOPHEN 650 MG: 325 TABLET ORAL at 18:29

## 2024-05-05 RX ADMIN — DOCUSATE SODIUM 100 MG: 100 CAPSULE, LIQUID FILLED ORAL at 20:10

## 2024-05-05 RX ADMIN — METHOCARBAMOL 500 MG: 500 TABLET ORAL at 09:00

## 2024-05-05 RX ADMIN — POLYETHYLENE GLYCOL 3350 17 G: 17 POWDER, FOR SOLUTION ORAL at 09:00

## 2024-05-05 RX ADMIN — GABAPENTIN 300 MG: 300 CAPSULE ORAL at 06:23

## 2024-05-05 RX ADMIN — POLYETHYLENE GLYCOL 3350 17 G: 17 POWDER, FOR SOLUTION ORAL at 20:10

## 2024-05-05 RX ADMIN — METHOCARBAMOL 500 MG: 500 TABLET ORAL at 15:46

## 2024-05-05 RX ADMIN — ACETAMINOPHEN 650 MG: 325 TABLET ORAL at 06:23

## 2024-05-05 RX ADMIN — DOCUSATE SODIUM 100 MG: 100 CAPSULE, LIQUID FILLED ORAL at 09:00

## 2024-05-05 RX ADMIN — ACETAMINOPHEN 650 MG: 325 TABLET ORAL at 13:20

## 2024-05-05 ASSESSMENT — PAIN SCALES - GENERAL
PAINLEVEL_OUTOF10: 0 - NO PAIN

## 2024-05-05 ASSESSMENT — PAIN - FUNCTIONAL ASSESSMENT
PAIN_FUNCTIONAL_ASSESSMENT: 0-10

## 2024-05-05 ASSESSMENT — COGNITIVE AND FUNCTIONAL STATUS - GENERAL
PERSONAL GROOMING: A LITTLE
DRESSING REGULAR UPPER BODY CLOTHING: A LOT
DAILY ACTIVITIY SCORE: 13
DRESSING REGULAR LOWER BODY CLOTHING: TOTAL
HELP NEEDED FOR BATHING: A LOT
STANDING UP FROM CHAIR USING ARMS: A LITTLE
TOILETING: TOTAL
TURNING FROM BACK TO SIDE WHILE IN FLAT BAD: A LOT
CLIMB 3 TO 5 STEPS WITH RAILING: TOTAL
MOVING FROM LYING ON BACK TO SITTING ON SIDE OF FLAT BED WITH BEDRAILS: A LOT
MOVING TO AND FROM BED TO CHAIR: A LOT
WALKING IN HOSPITAL ROOM: A LOT
MOBILITY SCORE: 12

## 2024-05-05 ASSESSMENT — ACTIVITIES OF DAILY LIVING (ADL)
BATHING_LEVEL_OF_ASSISTANCE: CLOSE SUPERVISION
BATHING_WHERE_ASSESSED: EDGE OF BED
HOME_MANAGEMENT_TIME_ENTRY: 36

## 2024-05-05 NOTE — PROGRESS NOTES
"Orthopaedic Spine Surgery Progress Note    S:  Retained urine last night, castaneda placed. Now passing gas, feeling much better. Feels like he is getting stronger everyday. Denies any CP, SOB, N/V.  KUB with possible ileus.     O:  /73 (BP Location: Left arm, Patient Position: Lying)   Pulse 71   Temp 36.4 °C (97.5 °F) (Temporal)   Resp 16   Ht 1.956 m (6' 5\")   Wt (!) 214 kg (470 lb 15.9 oz)   SpO2 97%   BMI 55.85 kg/m²     Gen: arousable, NAD, appropriately conversational  Cardiac: RRR to peripheral palpation  Resp: nonlabored on RA  GI: soft, nondistended    MSK:    Spine Exam:  Drain in place holding suction, serosanginous output  Provena holding suction    L1: SILT       L2: SILT      Hip flexors 5/5 Left; 5/5 Right  L3: SILT      Knee extension 5/5 Left; 5/5 Right  L4: SILT      Tib Ant. (Dorsiflexion) 5/5 Left; 5/5 Right  L5: SILT      EHL 5/5 Left; 5/5 Right  S1: SILT      Plantarflexion 5/5 Left; 5/5 Right    Patellar reflex: 2+   Bilaterally    Babinkski: Intact  No clonus    Labs:  Results for orders placed or performed during the hospital encounter of 04/30/24 (from the past 24 hour(s))   Basic metabolic panel   Result Value Ref Range    Glucose 84 74 - 99 mg/dL    Sodium 138 136 - 145 mmol/L    Potassium 4.0 3.5 - 5.3 mmol/L    Chloride 102 98 - 107 mmol/L    Bicarbonate 24 21 - 32 mmol/L    Anion Gap 16 10 - 20 mmol/L    Urea Nitrogen 18 6 - 23 mg/dL    Creatinine 0.54 0.50 - 1.30 mg/dL    eGFR >90 >60 mL/min/1.73m*2    Calcium 8.8 8.6 - 10.6 mg/dL   CBC   Result Value Ref Range    WBC 16.2 (H) 4.4 - 11.3 x10*3/uL    nRBC 0.0 0.0 - 0.0 /100 WBCs    RBC 5.25 4.50 - 5.90 x10*6/uL    Hemoglobin 14.5 13.5 - 17.5 g/dL    Hematocrit 44.1 41.0 - 52.0 %    MCV 84 80 - 100 fL    MCH 27.6 26.0 - 34.0 pg    MCHC 32.9 32.0 - 36.0 g/dL    RDW 14.0 11.5 - 14.5 %    Platelets 374 150 - 450 x10*3/uL         A/P: 24 y.o. male with severe stenosis at L4-5 from congenital lumbar stenosis and disc herniation s/p " L4-5 lami on 5/2 with Dr. Grier. Doing well postop.       Plan:    - Weightbearing status: activity ad trent, highly encourage out of bed and ambulation as soon as possible, prioritize time in chair. Avoid lying on incision. Need to offload incision  - Precautions: no bending, twisting, lifting >10 lbs  - Imaging: none required  - Pain: multimodal regimen including Tylenol, oxycodone, Dilaudid, robaxin  - Dexamethasone 10 mg IV q8h x3 doses complete  - Perioperative antibiotics: Ancef 23g q8h x24 hrs  - DVT prophylaxis: SCDs, ambulation; no chemoppx  - Dressing: Provena  - Drain: DC'ed  - Godoy: Placed overnight, will try to DC if mobilizing well  - FEN: maintenance IV fluids; HLIV with good PO intake  - Diet: clear liquid diet   - Pulm: encourage IS, maintain O2 sat >92%  - Aggressive Bowel Regimen  - PT/OT consult  - Daily CBC, BMP     Dispo: Pending PT/OTleonel MD  Orthopaedic Surgery, PGY-2  Available by Epic Chat  05/05/24  9:26 AM    This patient will be followed by the Orthopaedic Spine service. Please page or Epic Chat the corresponding residents below with questions or concerns.     Ortho Spine Service ([employer] Chat Preferred)    First call: Rigoberto Ng, PGY2  Second call: Ar Edwards, PGY4      I saw and evaluated the patient.  I personally obtained the key and critical portions of the history and physical exam or was physically present for key and critical portions performed by the Resident. I reviewed the documentation and discussed the patient with the Resident.  I agree with the Resident’s medical decision making as documented in the note.    Feels stronger. Leg pain resolved. Still some numbness  Ambulating small steps with PT. Requiring three assist to get out of bed  Able to void when standing but difficult when supine  Constipation. Need aggressive bowel regimen

## 2024-05-05 NOTE — PROGRESS NOTES
Physical Therapy    Physical Therapy Treatment    Patient Name: Erwin Cline  MRN: [id number]  Today's Date: 5/5/2024  Time Calculation  Start Time: 1107  Stop Time: 1148  Time Calculation (min): 41 min       Assessment/Plan   PT Assessment  End of Session Communication: Bedside nurse  Assessment Comment: pt continues to show increased tolerance to upright activites on this date. pt with increased motivation on this date to conintue to progress functional mobility. pt remains appropriate for HIGH intensity PT upon D/C to return to PLOF.  End of Session Patient Position: Bed, 4 rail up, Alarm off, caregiver present  PT Plan  Inpatient/Swing Bed or Outpatient: Inpatient  PT Plan  Treatment/Interventions: Bed mobility, Transfer training, Gait training, Balance training, Strengthening, Therapeutic exercise, Endurance training, Range of motion, Therapeutic activity, Home exercise program  PT Plan: Skilled PT  PT Frequency: Daily  PT Discharge Recommendations: High intensity level of continued care  Equipment Recommended upon Discharge: Wheeled walker  PT Recommended Transfer Status: Assist x2, Assistive device  PT - OK to Discharge: Yes      General Visit Information:   PT  Visit  PT Received On: 05/05/24  Response to Previous Treatment: Patient with no complaints from previous session.  General  Reason for Referral: Herniated lumbar disc without myelopathy, L4-5 lami/discectomy  Past Medical History Relevant to Rehab: 25 yo M (morbid obesity) presents w LBP flare up, spontaneously developed SA and urinary incontinence x1 at outside ED.  History of ADHD, learning disability  Co-Treatment: OT  Co-Treatment Reason: to maximize mobility due to low ampac score  Prior to Session Communication: Bedside nurse  Patient Position Received: Bed, 3 rail up, Alarm off, not on at start of session  Preferred Learning Style: verbal  General Comment: Pt supine in bed upon arrival and agreeable to participate. Reports feeling  better this morning and able to move his LE more than the day prior.    Subjective   Precautions:  Precautions  Medical Precautions: Fall precautions  Post-Surgical Precautions: Spinal precautions     Objective   Pain:  Pain Assessment  Pain Assessment: 0-10  Pain Score: 0 - No pain  Cognition:  Cognition  Overall Cognitive Status: Within Functional Limits  Orientation Level: Oriented X4  Following Commands: Follows all commands and directions without difficulty  Postural Control:  Static Sitting Balance  Static Sitting-Balance Support: Left upper extremity supported, Feet supported  Static Sitting-Level of Assistance: Contact guard  Static Sitting-Comment/Number of Minutes: 10 minutes  Dynamic Sitting Balance  Dynamic Sitting-Balance Support: Feet supported  Dynamic Sitting-Balance:  (performing various functional tasks while seated.)  Dynamic Sitting-Comments: CGA  Static Standing Balance  Static Standing-Balance Support: Bilateral upper extremity supported (bariatric FWW)  Static Standing-Level of Assistance: Minimum assistance  Static Standing-Comment/Number of Minutes: 5 minutes  Dynamic Standing Balance  Dynamic Standing-Balance Support: Bilateral upper extremity supported (bariatric FWW)  Dynamic Standing-Comments: Mod A    Activity Tolerance:  Activity Tolerance  Endurance: Tolerates 10 - 20 min exercise with multiple rests  Treatments:  Therapeutic Exercise  Therapeutic Exercise Performed: Yes  Therapeutic Exercise Activity 1: Seated AP, LAQ, Heel Slides: AAROM x 10 reps each. (limited ROM during LAQ. slight increase in back discomfort.)    Therapeutic Activity  Therapeutic Activity Performed: Yes  Therapeutic Activity 1: To increase OOB activity tolerance needed ofr ADL completion, patient sat at EOB x~20 minutes with CGA-SBA.    Bed Mobility  Bed Mobility: Yes  Bed Mobility 1  Bed Mobility 1: Supine to sitting  Level of Assistance 1: Moderate assistance (of 2 using log roll technique, patient able to bend  knees in preparation independently)  Bed Mobility Comments 1: verbal and tactile cues for technique.  Bed Mobility 2  Bed Mobility  2: Sitting to supine  Level of Assistance 2: Moderate assistance (x 2 using log roll technique,)  Bed Mobility Comments 2:  (verbal and tactile cues for log roll. Mod A to elevated LE to bed.)  Bed Mobility 3  Bed Mobility 3: Rolling left  Level of Assistance 3: Minimum assistance, Minimal verbal cues, Minimal tactile cues    Ambulation/Gait Training  Ambulation/Gait Training Performed: Yes  Ambulation/Gait Training 1  Surface 1: Level tile  Device 1: Rolling walker  Assistance 1: Moderate assistance, Moderate verbal cues, Moderate tactile cues (assist to advance FWW. cues for foot placement.)  Quality of Gait 1: Shuffling gait, Decreased step length  Comments/Distance (ft) 1: 6 side steps to HOB. x 2 trials (increased fatigue post ambulation. Seated rest period between trials. pt with minimal dizziness upon standing, receded after 30 seconds.)  Transfers  Transfer: Yes  Transfer 1  Transfer From 1: Sit to, Stand to  Transfer to 1: Stand, Sit  Technique 1: Sit to stand  Transfer Device 1:  (bariatric FWW)  Transfer Level of Assistance 1: Minimum assistance, +2, Moderate verbal cues (verbal cueing from hand placement.)  Trials/Comments 1: x2 trials (1 trial from bed and 1 trial from bench chair.)    Outcome Measures:  [facility] Basic Mobility  Turning from your back to your side while in a flat bed without using bedrails: A lot  Moving from lying on your back to sitting on the side of a flat bed without using bedrails: A lot  Moving to and from bed to chair (including a wheelchair): A lot  Standing up from a chair using your arms (e.g. wheelchair or bedside chair): A little  To walk in hospital room: A lot  Climbing 3-5 steps with railing: Total  Basic Mobility - Total Score: 12    Education Documentation  Precautions, taught by Selvin Gomez PTA at 5/5/2024  1:31 PM.  Learner:  Patient  Readiness: Acceptance  Method: Explanation  Response: Verbalizes Understanding    Mobility Training, taught by Selvin Gomez PTA at 5/5/2024  1:31 PM.  Learner: Patient  Readiness: Acceptance  Method: Explanation  Response: Verbalizes Understanding    Education Comments  No comments found.           Encounter Problems       Encounter Problems (Active)       Mobility       Pt will be Christiane for ambulation 50 ft with RW (Progressing)       Start:  05/03/24    Expected End:  05/17/24            Pt will be Christiane to ascend/descend 6 steps with 1 handrail (Progressing)       Start:  05/03/24    Expected End:  05/17/24               PT Transfers       Pt will be Christiane for sit to stand and bed to chair transfers with RW (Progressing)       Start:  05/03/24    Expected End:  05/17/24            Pt will be Christiane for bed mobility (Progressing)       Start:  05/03/24    Expected End:  05/17/24               Pain - Adult

## 2024-05-05 NOTE — CARE PLAN
The patient's goals for the shift include walking with PT in room    The clinical goals for the shift include increase mobility  Problem: Pain - Adult  Goal: Verbalizes/displays adequate comfort level or baseline comfort level  Outcome: Progressing     Problem: Safety - Adult  Goal: Free from fall injury  Outcome: Progressing     Problem: Discharge Planning  Goal: Discharge to home or other facility with appropriate resources  Outcome: Progressing     Problem: Chronic Conditions and Co-morbidities  Goal: Patient's chronic conditions and co-morbidity symptoms are monitored and maintained or improved  Outcome: Progressing     Problem: Skin  Goal: Decreased wound size/increased tissue granulation at next dressing change  Outcome: Progressing  Goal: Participates in plan/prevention/treatment measures  Outcome: Progressing  Goal: Prevent/manage excess moisture  Outcome: Progressing  Goal: Prevent/minimize sheer/friction injuries  Outcome: Progressing  Goal: Promote/optimize nutrition  Outcome: Progressing  Goal: Promote skin healing  Outcome: Progressing     Problem: Pain  Goal: Takes deep breaths with improved pain control throughout the shift  Outcome: Progressing  Goal: Turns in bed with improved pain control throughout the shift  Outcome: Progressing  Goal: Walks with improved pain control throughout the shift  Outcome: Progressing  Goal: Performs ADL's with improved pain control throughout shift  Outcome: Progressing  Goal: Participates in PT with improved pain control throughout the shift  Outcome: Progressing  Goal: Free from opioid side effects throughout the shift  Outcome: Progressing  Goal: Free from acute confusion related to pain meds throughout the shift  Outcome: Progressing     Problem: Fall/Injury  Goal: Not fall by end of shift  Outcome: Progressing  Goal: Be free from injury by end of the shift  Outcome: Progressing  Goal: Verbalize understanding of personal risk factors for fall in the  hospital  Outcome: Progressing  Goal: Verbalize understanding of risk factor reduction measures to prevent injury from fall in the home  Outcome: Progressing  Goal: Use assistive devices by end of the shift  Outcome: Progressing  Goal: Pace activities to prevent fatigue by end of the shift  Outcome: Progressing

## 2024-05-05 NOTE — PROGRESS NOTES
Occupational Therapy    Occupational Therapy Treatment    Name: Erwin Cline  MRN: [id number]  : 2000  Date: 24  Time Calculation  Start Time: 1107  Stop Time: 1143  Time Calculation (min): 36 min    Assessment:  OT Assessment: Patient demonstrated improvement in ADL, bed mobility and transer today.  Patient receptive to instruction and motivated to improve.  Continued OT indicated.  End of Session Communication: Bedside nurse  End of Session Patient Position: Up in chair  Plan:  Treatment Interventions: ADL retraining, Functional transfer training, UE strengthening/ROM, Endurance training, Compensatory technique education  OT Frequency: 3 times per week  OT Discharge Recommendations: High intensity level of continued care  Equipment Recommended upon Discharge:  (TBD)  OT Recommended Transfer Status: Assist of 2  OT - OK to Discharge: Yes    Subjective   Previous Visit Info:  OT Last Visit  OT Received On: 24  General:  General  Reason for Referral: Herniated lumbar disc without myelopathy, L4-5 lami/discectomy  Past Medical History Relevant to Rehab: 25 yo M (morbid obesity) presents w LBP flare up, spontaneously developed SA and urinary incontinence x1 at outside ED.  History of ADHD, learning disability  Co-Treatment: PT  Co-Treatment Reason: to maximize mobility due to low ampac score  Prior to Session Communication: Bedside nurse  Precautions:  Medical Precautions: Fall precautions  Post-Surgical Precautions: Spinal precautions       Pain Assessment:  Pain Assessment  Pain Assessment: 0-10  Pain Score: 0 - No pain     Objective   Cognition:  Following Commands: Follows all commands and directions without difficulty  Activities of Daily Living: Grooming  Grooming Level of Assistance: Independent (to apply deodorant)    UE Bathing  UE Bathing Level of Assistance: Minimum assistance (UEs, abdomen, chest, max assist for back)  UE Bathing Where Assessed: Edge of bed (using  bathwipes)    LE Bathing  LE Bathing Level of Assistance: Close supervision (to knees, dependent below knees, provided instruction on use of long sponge/bath brush/poof on a stick.  Patient indicates having bath brush at home)  LE Bathing Where Assessed: Edge of bed    UE Dressing  UE Dressing Level of Assistance: Minimum assistance (to don and doff gown)  UE Dressing Where Assessed: Edge of bed    LE Dressing  LE Dressing: Yes  Sock Level of Assistance: Dependent (provided instruction on use of sock aide, patient unable to complete return demo due to OT's sock aide being too small for his foot. Patient will require use of wide sock aide)         Bed Mobility/Transfers: Bed Mobility  Bed Mobility: Yes  Bed Mobility 1  Bed Mobility 1: Supine to sitting  Level of Assistance 1: Moderate assistance (of 2 using log roll technique, patient able to bend knees in preparation independently)  Bed Mobility Comments 1: cues required for technique  Bed Mobility 2  Bed Mobility  2: Rolling left  Level of Assistance 2: Minimum assistance (with verbal cues)    Transfers  Transfer: Yes  Transfer 1  Transfer From 1: Sit to, Stand to  Transfer to 1: Stand, Sit  Transfer Device 1: Walker  Transfer Level of Assistance 1: Minimum assistance (of 2, cues for technique)    Sitting Balance:  Static Sitting Balance  Static Sitting-Balance Support: No upper extremity supported  Static Sitting-Level of Assistance: Close supervision  Dynamic Sitting Balance  Dynamic Sitting-Balance Support: No upper extremity supported  Dynamic Sitting-Balance:  (close supervision)  Standing Balance:  Dynamic Standing Balance  Dynamic Standing-Balance:  (mod assist of 2 at walker level)    Outcome Measures:  [facility] Daily Activity  Putting on and taking off regular lower body clothing: Total  Bathing (including washing, rinsing, drying): A lot  Putting on and taking off regular upper body clothing: A lot  Toileting, which includes using toilet, bedpan or urinal:  Total  Taking care of personal grooming such as brushing teeth: A little  Eating Meals: None  Daily Activity - Total Score: 13        Education Documentation  Body Mechanics, taught by Kandy Kilgore OT at 5/5/2024 11:59 AM.  Learner: Patient  Readiness: Acceptance  Method: Explanation  Response: Verbalizes Understanding  Comment: ADL technique, AE use, bed mobility and transfer technique    Precautions, taught by Kandy Kilgore OT at 5/5/2024 11:59 AM.  Learner: Patient  Readiness: Acceptance  Method: Explanation  Response: Verbalizes Understanding  Comment: ADL technique, AE use, bed mobility and transfer technique    ADL Training, taught by Kandy Kilgore OT at 5/5/2024 11:59 AM.  Learner: Patient  Readiness: Acceptance  Method: Explanation  Response: Verbalizes Understanding  Comment: ADL technique, AE use, bed mobility and transfer technique        Goals:  Encounter Problems       Encounter Problems (Active)       ADLs       Patient will perform UB and LB bathing  with minimal assist  level of assistance and long-handled sponge. (Progressing)       Start:  05/03/24    Expected End:  05/17/24            Patient with complete lower body dressing with minimal assist  level of assistance donning and doffing all LE clothes  with PRN adaptive equipment. (Progressing)       Start:  05/03/24    Expected End:  05/17/24            Patient will complete daily grooming tasks  with modified independent level of assistance and PRN adaptive equipment while standing. (Progressing)       Start:  05/03/24    Expected End:  05/17/24            Patient will complete toileting including hygiene clothing management/hygiene with minimal assist  level of assistance and AE/DME as needed. (Progressing)       Start:  05/03/24    Expected End:  05/17/24               BALANCE       Pt will maintain dynamic standing balance during ADL task with minimal assist  level of assistance in order to demonstrate decreased risk of falling and  improved postural control. (Progressing)       Start:  05/03/24    Expected End:  05/17/24               TRANSFERS       Patient will perform bed mobility moderate assist level of assistance and bed rails in order to improve safety and independence with mobility (Progressing)       Start:  05/03/24    Expected End:  05/17/24            Patient will complete functional transfers with least restrictive device with minimal assist  level of assistance. (Progressing)       Start:  05/03/24    Expected End:  05/17/24

## 2024-05-05 NOTE — CARE PLAN
The patient's goals for the shift include less anxious    The clinical goals for the shift include increase mobility    Over the shift, the patient did make progress toward the following goals. Barriers to progression include n/a. Recommendations to address these barriers include n/a.

## 2024-05-06 LAB
ANION GAP SERPL CALC-SCNC: 12 MMOL/L (ref 10–20)
BUN SERPL-MCNC: 22 MG/DL (ref 6–23)
CALCIUM SERPL-MCNC: 8.6 MG/DL (ref 8.6–10.6)
CHLORIDE SERPL-SCNC: 105 MMOL/L (ref 98–107)
CO2 SERPL-SCNC: 27 MMOL/L (ref 21–32)
CREAT SERPL-MCNC: 0.57 MG/DL (ref 0.5–1.3)
EGFRCR SERPLBLD CKD-EPI 2021: >90 ML/MIN/1.73M*2
ERYTHROCYTE [DISTWIDTH] IN BLOOD BY AUTOMATED COUNT: 14.4 % (ref 11.5–14.5)
GLUCOSE SERPL-MCNC: 63 MG/DL (ref 74–99)
HCT VFR BLD AUTO: 44 % (ref 41–52)
HGB BLD-MCNC: 14.2 G/DL (ref 13.5–17.5)
MCH RBC QN AUTO: 27.2 PG (ref 26–34)
MCHC RBC AUTO-ENTMCNC: 32.3 G/DL (ref 32–36)
MCV RBC AUTO: 84 FL (ref 80–100)
NRBC BLD-RTO: 0 /100 WBCS (ref 0–0)
PLATELET # BLD AUTO: 305 X10*3/UL (ref 150–450)
POTASSIUM SERPL-SCNC: 4 MMOL/L (ref 3.5–5.3)
RBC # BLD AUTO: 5.23 X10*6/UL (ref 4.5–5.9)
SODIUM SERPL-SCNC: 140 MMOL/L (ref 136–145)
WBC # BLD AUTO: 13.5 X10*3/UL (ref 4.4–11.3)

## 2024-05-06 PROCEDURE — 2500000001 HC RX 250 WO HCPCS SELF ADMINISTERED DRUGS (ALT 637 FOR MEDICARE OP)

## 2024-05-06 PROCEDURE — 85027 COMPLETE CBC AUTOMATED: CPT

## 2024-05-06 PROCEDURE — 2500000004 HC RX 250 GENERAL PHARMACY W/ HCPCS (ALT 636 FOR OP/ED)

## 2024-05-06 PROCEDURE — 1100000001 HC PRIVATE ROOM DAILY

## 2024-05-06 PROCEDURE — 82374 ASSAY BLOOD CARBON DIOXIDE: CPT

## 2024-05-06 PROCEDURE — 36415 COLL VENOUS BLD VENIPUNCTURE: CPT

## 2024-05-06 RX ORDER — BISACODYL 10 MG/1
10 SUPPOSITORY RECTAL ONCE
Status: COMPLETED | OUTPATIENT
Start: 2024-05-06 | End: 2024-05-06

## 2024-05-06 RX ADMIN — METHOCARBAMOL 500 MG: 500 TABLET ORAL at 09:03

## 2024-05-06 RX ADMIN — GABAPENTIN 300 MG: 300 CAPSULE ORAL at 06:43

## 2024-05-06 RX ADMIN — METHOCARBAMOL 500 MG: 500 TABLET ORAL at 16:08

## 2024-05-06 RX ADMIN — POLYETHYLENE GLYCOL 3350 17 G: 17 POWDER, FOR SOLUTION ORAL at 22:15

## 2024-05-06 RX ADMIN — GABAPENTIN 300 MG: 300 CAPSULE ORAL at 13:17

## 2024-05-06 RX ADMIN — ACETAMINOPHEN 650 MG: 325 TABLET ORAL at 06:43

## 2024-05-06 RX ADMIN — POLYETHYLENE GLYCOL 3350 17 G: 17 POWDER, FOR SOLUTION ORAL at 09:03

## 2024-05-06 RX ADMIN — DOCUSATE SODIUM 100 MG: 100 CAPSULE, LIQUID FILLED ORAL at 09:03

## 2024-05-06 RX ADMIN — ACETAMINOPHEN 650 MG: 325 TABLET ORAL at 19:13

## 2024-05-06 RX ADMIN — DOCUSATE SODIUM 100 MG: 100 CAPSULE, LIQUID FILLED ORAL at 22:15

## 2024-05-06 RX ADMIN — ACETAMINOPHEN 650 MG: 325 TABLET ORAL at 13:15

## 2024-05-06 RX ADMIN — METHOCARBAMOL 500 MG: 500 TABLET ORAL at 22:15

## 2024-05-06 RX ADMIN — BISACODYL 10 MG: 10 SUPPOSITORY RECTAL at 09:03

## 2024-05-06 RX ADMIN — GABAPENTIN 300 MG: 300 CAPSULE ORAL at 22:15

## 2024-05-06 RX ADMIN — ACETAMINOPHEN 650 MG: 325 TABLET ORAL at 23:31

## 2024-05-06 ASSESSMENT — PAIN - FUNCTIONAL ASSESSMENT
PAIN_FUNCTIONAL_ASSESSMENT: 0-10

## 2024-05-06 ASSESSMENT — PAIN SCALES - GENERAL
PAINLEVEL_OUTOF10: 0 - NO PAIN

## 2024-05-06 NOTE — PROGRESS NOTES
"Orthopaedic Spine Surgery Progress Note    S:  Passing gas, still no BM, KUB w/ potential ileus but no longer having any abdominal pain. Did better with PT yesterday. Continues to feel like he is getting stronger everyday.  Denies any CP, SOB, N/V.     O:  /81 (BP Location: Left arm, Patient Position: Lying)   Pulse 77   Temp 36.3 °C (97.3 °F) (Temporal)   Resp 20   Ht 1.956 m (6' 5\")   Wt (!) 214 kg (470 lb 15.9 oz)   SpO2 95%   BMI 55.85 kg/m²     Gen: arousable, NAD, appropriately conversational  Cardiac: RRR to peripheral palpation  Resp: nonlabored on RA  GI: soft, nondistended    MSK:    Spine Exam:  Drain in place holding suction, serosanginous output  Provena holding suction    L1: SILT       L2: SILT      Hip flexors 5/5 Left; 5/5 Right  L3: SILT      Knee extension 5/5 Left; 5/5 Right  L4: SILT      Tib Ant. (Dorsiflexion) 5/5 Left; 5/5 Right  L5: SILT      EHL 5/5 Left; 5/5 Right  S1: SILT      Plantarflexion 5/5 Left; 5/5 Right    Patellar reflex: 2+   Bilaterally    Babinkski: Intact  No clonus    Labs:  No results found for this or any previous visit (from the past 24 hour(s)).        A/P: 24 y.o. male with severe stenosis at L4-5 from congenital lumbar stenosis and disc herniation s/p L4-5 lami on 5/2 with Dr. Grier. Doing well postop.       Plan:    - Weightbearing status: activity ad trent, highly encourage out of bed and ambulation as soon as possible, prioritize time in chair. Avoid lying on incision. Need to offload incision  - Precautions: no bending, twisting, lifting >10 lbs  - Imaging: none required  - Pain: multimodal regimen including Tylenol, oxycodone, Dilaudid, robaxin  - Dexamethasone 10 mg IV q8h x3 doses complete  - Perioperative antibiotics: Ancef 23g q8h x24 hrs  - DVT prophylaxis: SCDs, ambulation; no chemoppx  - Dressing: Provena  - Drain: DC'ed  - Godoy: Will try to DC if mobilizing well  - FEN: maintenance IV fluids; HLIV with good PO intake  - Diet: clear liquid " diet, will continue given no BM, likely ileus on xray  - Suppository today  - Pulm: encourage IS, maintain O2 sat >92%  - Bowel Regimen  - PT/OT consult  - Daily CBC, BMP     Dispo: Rec'ed acute rehab, also pending DONNELL castaneda MD  Orthopaedic Surgery, PGY-2  Available by Epic Chat  05/06/24  7:40 AM    This patient will be followed by the Orthopaedic Spine service. Please page or Epic Chat the corresponding residents below with questions or concerns.     Ortho Spine Service ([employer] Chat Preferred)    First call: Rigoberto Ng, PGY2  Second call: Ar Edwards, PGY4      I saw and evaluated the patient.  I personally obtained the key and critical portions of the history and physical exam or was physically present for key and critical portions performed by the Resident. I reviewed the documentation and discussed the patient with the Resident.  I agree with the Resident’s medical decision making as documented in the note.    +Flatus  Continue bowel regimen   Continue PT/OT. Encourage out of bed

## 2024-05-06 NOTE — CARE PLAN
The patient's goals for the shift include mobility increased    The clinical goals for the shift include increase mobility, dangle on side of bed, BM      7:40 PM   Patient had 2 medium BM's on the bedpan  and IC in bed. It took for ppl to get him off of the commode and back into bed.     Two assist to stand straight with walker, limited movement with lots of encouragement. Ordered bariatric commode. Plan to get him to dangle and OOB to commode tomorrow.     Denies pain. In bed safe with call bell within reach.

## 2024-05-06 NOTE — CARE PLAN
The patient's goals for the shift include have questions answered    The clinical goals for the shift include increase mobility    Over the shift, the patient did make progress toward the following goals. Barriers to progression include n/a. Recommendations to address these barriers include n/a.

## 2024-05-06 NOTE — PROGRESS NOTES
ANAMIKA followed up on AR referral for [facility] rehab. Pending acceptance. SW will continue to follow.    1626-[facility] unable to accept due to patients weight. SW attempted to update patient via phone and inquire if patient is agreeable to SNF. Patient did not answer room phone and cell phone # listed is disconnected. SW followed up with Community Health Worker (CHW). Assigned CHW to provide resources states she was not onsite today and plans to see patient tomorrow. ANAMIKA updated bedside RN. SW will continue to follow.     1634-ANAMIKA successfully made contact with patient. ANAMIKA updated patient on denial at [facility]. SW updated pt on [facility], unclear if they have the same weight limit requirement. SW submitted referral to [facility] asking if they have a weight limit. Patient states he will be agreeable to SNF if [facility] cannot accept. Pending update from [facility]. SW will continue to follow.      CONSTANCE Cordero

## 2024-05-07 LAB
ANION GAP SERPL CALC-SCNC: 14 MMOL/L (ref 10–20)
BUN SERPL-MCNC: 16 MG/DL (ref 6–23)
CALCIUM SERPL-MCNC: 9.1 MG/DL (ref 8.6–10.6)
CHLORIDE SERPL-SCNC: 102 MMOL/L (ref 98–107)
CO2 SERPL-SCNC: 28 MMOL/L (ref 21–32)
CREAT SERPL-MCNC: 0.61 MG/DL (ref 0.5–1.3)
EGFRCR SERPLBLD CKD-EPI 2021: >90 ML/MIN/1.73M*2
ERYTHROCYTE [DISTWIDTH] IN BLOOD BY AUTOMATED COUNT: 14.5 % (ref 11.5–14.5)
GLUCOSE SERPL-MCNC: 82 MG/DL (ref 74–99)
HCT VFR BLD AUTO: 47.4 % (ref 41–52)
HGB BLD-MCNC: 14.8 G/DL (ref 13.5–17.5)
MCH RBC QN AUTO: 27 PG (ref 26–34)
MCHC RBC AUTO-ENTMCNC: 31.2 G/DL (ref 32–36)
MCV RBC AUTO: 87 FL (ref 80–100)
NRBC BLD-RTO: 0 /100 WBCS (ref 0–0)
PLATELET # BLD AUTO: 349 X10*3/UL (ref 150–450)
POTASSIUM SERPL-SCNC: 4.5 MMOL/L (ref 3.5–5.3)
RBC # BLD AUTO: 5.48 X10*6/UL (ref 4.5–5.9)
SODIUM SERPL-SCNC: 139 MMOL/L (ref 136–145)
WBC # BLD AUTO: 16 X10*3/UL (ref 4.4–11.3)

## 2024-05-07 PROCEDURE — 2500000004 HC RX 250 GENERAL PHARMACY W/ HCPCS (ALT 636 FOR OP/ED)

## 2024-05-07 PROCEDURE — 97116 GAIT TRAINING THERAPY: CPT | Mod: GP

## 2024-05-07 PROCEDURE — 97110 THERAPEUTIC EXERCISES: CPT | Mod: GP

## 2024-05-07 PROCEDURE — 85027 COMPLETE CBC AUTOMATED: CPT

## 2024-05-07 PROCEDURE — 2500000001 HC RX 250 WO HCPCS SELF ADMINISTERED DRUGS (ALT 637 FOR MEDICARE OP)

## 2024-05-07 PROCEDURE — 36415 COLL VENOUS BLD VENIPUNCTURE: CPT

## 2024-05-07 PROCEDURE — 97530 THERAPEUTIC ACTIVITIES: CPT | Mod: GP

## 2024-05-07 PROCEDURE — 80048 BASIC METABOLIC PNL TOTAL CA: CPT

## 2024-05-07 PROCEDURE — 1100000001 HC PRIVATE ROOM DAILY

## 2024-05-07 RX ADMIN — METHOCARBAMOL 500 MG: 500 TABLET ORAL at 15:01

## 2024-05-07 RX ADMIN — POLYETHYLENE GLYCOL 3350 17 G: 17 POWDER, FOR SOLUTION ORAL at 22:18

## 2024-05-07 RX ADMIN — ACETAMINOPHEN 650 MG: 325 TABLET ORAL at 12:11

## 2024-05-07 RX ADMIN — METHOCARBAMOL 500 MG: 500 TABLET ORAL at 08:58

## 2024-05-07 RX ADMIN — GABAPENTIN 300 MG: 300 CAPSULE ORAL at 15:01

## 2024-05-07 RX ADMIN — GABAPENTIN 300 MG: 300 CAPSULE ORAL at 05:05

## 2024-05-07 RX ADMIN — DOCUSATE SODIUM 100 MG: 100 CAPSULE, LIQUID FILLED ORAL at 08:58

## 2024-05-07 RX ADMIN — ACETAMINOPHEN 650 MG: 325 TABLET ORAL at 05:05

## 2024-05-07 RX ADMIN — METHOCARBAMOL 500 MG: 500 TABLET ORAL at 22:18

## 2024-05-07 RX ADMIN — DOCUSATE SODIUM 100 MG: 100 CAPSULE, LIQUID FILLED ORAL at 22:18

## 2024-05-07 RX ADMIN — GABAPENTIN 300 MG: 300 CAPSULE ORAL at 22:18

## 2024-05-07 ASSESSMENT — PAIN SCALES - GENERAL
PAINLEVEL_OUTOF10: 0 - NO PAIN
PAINLEVEL_OUTOF10: 0 - NO PAIN

## 2024-05-07 ASSESSMENT — COGNITIVE AND FUNCTIONAL STATUS - GENERAL
MOBILITY SCORE: 14
CLIMB 3 TO 5 STEPS WITH RAILING: TOTAL
TURNING FROM BACK TO SIDE WHILE IN FLAT BAD: A LOT
WALKING IN HOSPITAL ROOM: A LITTLE
MOVING TO AND FROM BED TO CHAIR: A LITTLE
MOVING FROM LYING ON BACK TO SITTING ON SIDE OF FLAT BED WITH BEDRAILS: A LOT
STANDING UP FROM CHAIR USING ARMS: A LITTLE

## 2024-05-07 ASSESSMENT — PAIN - FUNCTIONAL ASSESSMENT: PAIN_FUNCTIONAL_ASSESSMENT: 0-10

## 2024-05-07 NOTE — PROGRESS NOTES
"Orthopaedic Spine Surgery Progress Note    S:  Passing gas, had BM x2, now on reg diet and tolerating. Denies nausea/vomiting. Continues to feel like he is improving. Was able to move more yesterday with help. Requires A LOT of encouragement. Patient very dependent on staff. Per family, patient spends most of his time in sitting or supine prior to hospitalization. Very sedentary. Denies any CP, SOB, N/V.     O:  /70 (Patient Position: Lying)   Pulse 85   Temp 36.5 °C (97.7 °F) (Temporal)   Resp 17   Ht 1.956 m (6' 5\")   Wt (!) 214 kg (470 lb 15.9 oz)   SpO2 97%   BMI 55.85 kg/m²     Gen: arousable, NAD, appropriately conversational  Cardiac: RRR to peripheral palpation  Resp: nonlabored on RA  GI: soft, nondistended    MSK:    Spine Exam:  Provena holding suction    L1: SILT       L2: SILT      Hip flexors 5/5 Left; 5/5 Right  L3: SILT      Knee extension 5/5 Left; 5/5 Right  L4: SILT      Tib Ant. (Dorsiflexion) 5/5 Left; 5/5 Right  L5: SILT      EHL 5/5 Left; 5/5 Right  S1: SILT      Plantarflexion 5/5 Left; 5/5 Right    Patellar reflex: 2+   Bilaterally    Babinkski: Intact  No clonus    Labs:  No results found for this or any previous visit (from the past 24 hour(s)).        A/P: 24 y.o. male with severe stenosis at L4-5 from congenital lumbar stenosis and disc herniation s/p L4-5 lami on 5/2 with Dr. Grier. Doing well postop.       Plan:    - Weightbearing status: activity ad trent, highly encourage out of bed and ambulation as soon as possible, prioritize time in chair. Avoid lying on incision. Need to offload incision  - Precautions: no bending, twisting, lifting >10 lbs  - Imaging: none required  - Pain: multimodal regimen including Tylenol, oxycodone, Dilaudid, robaxin  - DVT prophylaxis: SCDs, ambulation; no chemoppx  - Dressing: Provena  - Drain: DC'ed  - Godoy: Will try to DC if mobilizing well  - FEN: maintenance IV fluids; HLIV with good PO intake  - Diet: reg  - Pulm: encourage IS, maintain " O2 sat >92%  - Bowel Regimen  - PT/OT consult  - Daily CBC, BMP     Dispo: Rec'ed acute rehab, also pending leonel Ng MD  Orthopaedic Surgery, PGY-2  Available by Epic Chat  05/07/24  7:11 AM    This patient will be followed by the Orthopaedic Spine service. Please page or Epic Chat the corresponding residents below with questions or concerns.     Ortho Spine Service (Epic Chat Preferred)    First call: Rigoberto Ng, PGY2  Second call: Ar Edwards, PGY4      I saw and evaluated the patient.  I personally obtained the key and critical portions of the history and physical exam or was physically present for key and critical portions performed by the Resident. I reviewed the documentation and discussed the patient with the Resident.  I agree with the Resident’s medical decision making as documented in the note.    Pending SNF placement  BMX2 yesterday. Tolerating diet.

## 2024-05-07 NOTE — NURSING NOTE
"  /84 (BP Location: Left arm, Patient Position: Lying)   Pulse 73   Temp 36.6 °C (97.9 °F) (Temporal)   Resp 18   Ht 1.956 m (6' 5\")   Wt (!) 214 kg (470 lb 15.9 oz)   SpO2 100%   BMI 55.85 kg/m²   Denies pain - POC is DC to STR or SNF     PT completed today patient stood, side steps, and exercises. Erwin is motivated to get better and has been participating in his care.    Wound vac changed.       "

## 2024-05-07 NOTE — CARE PLAN
The patient's goals for the shift include mobility increased    The clinical goals for the shift include increase mobility, dangle on side of bed, BM      Problem: Pain - Adult  Goal: Verbalizes/displays adequate comfort level or baseline comfort level  Outcome: Progressing     Problem: Safety - Adult  Goal: Free from fall injury  Outcome: Progressing     Problem: Discharge Planning  Goal: Discharge to home or other facility with appropriate resources  Outcome: Progressing     Problem: Chronic Conditions and Co-morbidities  Goal: Patient's chronic conditions and co-morbidity symptoms are monitored and maintained or improved  Outcome: Progressing     Problem: Skin  Goal: Decreased wound size/increased tissue granulation at next dressing change  Outcome: Progressing  Goal: Participates in plan/prevention/treatment measures  Outcome: Progressing  Goal: Prevent/manage excess moisture  Outcome: Progressing  Goal: Prevent/minimize sheer/friction injuries  Outcome: Progressing  Goal: Promote/optimize nutrition  Outcome: Progressing  Goal: Promote skin healing  Outcome: Progressing     Problem: Pain  Goal: Takes deep breaths with improved pain control throughout the shift  Outcome: Progressing  Goal: Turns in bed with improved pain control throughout the shift  Outcome: Progressing  Goal: Walks with improved pain control throughout the shift  Outcome: Progressing  Goal: Performs ADL's with improved pain control throughout shift  Outcome: Progressing  Goal: Participates in PT with improved pain control throughout the shift  Outcome: Progressing  Goal: Free from opioid side effects throughout the shift  Outcome: Progressing  Goal: Free from acute confusion related to pain meds throughout the shift  Outcome: Progressing     Problem: Fall/Injury  Goal: Not fall by end of shift  Outcome: Progressing  Goal: Be free from injury by end of the shift  Outcome: Progressing  Goal: Verbalize understanding of personal risk factors for  fall in the hospital  Outcome: Progressing  Goal: Verbalize understanding of risk factor reduction measures to prevent injury from fall in the home  Outcome: Progressing  Goal: Use assistive devices by end of the shift  Outcome: Progressing  Goal: Pace activities to prevent fatigue by end of the shift  Outcome: Progressing

## 2024-05-07 NOTE — CARE PLAN
The patient's goals for the shift include mobility increased    The clinical goals for the shift include Patient will participate in care and stand with assistance.    Problem: Pain - Adult  Goal: Verbalizes/displays adequate comfort level or baseline comfort level  Outcome: Progressing     Problem: Safety - Adult  Goal: Free from fall injury  Outcome: Progressing     Problem: Discharge Planning  Goal: Discharge to home or other facility with appropriate resources  Outcome: Progressing     Problem: Chronic Conditions and Co-morbidities  Goal: Patient's chronic conditions and co-morbidity symptoms are monitored and maintained or improved  Outcome: Progressing     Problem: Pain  Goal: Takes deep breaths with improved pain control throughout the shift  Outcome: Progressing  Goal: Turns in bed with improved pain control throughout the shift  Outcome: Progressing  Goal: Walks with improved pain control throughout the shift  Outcome: Progressing  Goal: Performs ADL's with improved pain control throughout shift  Outcome: Progressing  Goal: Participates in PT with improved pain control throughout the shift  Outcome: Progressing  Goal: Free from opioid side effects throughout the shift  Outcome: Progressing  Goal: Free from acute confusion related to pain meds throughout the shift  Outcome: Progressing     Problem: Fall/Injury  Goal: Not fall by end of shift  Outcome: Progressing  Goal: Be free from injury by end of the shift  Outcome: Progressing  Goal: Verbalize understanding of personal risk factors for fall in the hospital  Outcome: Progressing  Goal: Verbalize understanding of risk factor reduction measures to prevent injury from fall in the home  Outcome: Progressing  Goal: Use assistive devices by end of the shift  Outcome: Progressing  Goal: Pace activities to prevent fatigue by end of the shift  Outcome: Progressing

## 2024-05-07 NOTE — PROGRESS NOTES
Physical Therapy    Physical Therapy Treatment    Patient Name: Erwin Cline  MRN: [id number]  Today's Date: 5/7/2024  Time Calculation  Start Time: 1208  Stop Time: 1307  Time Calculation (min): 59 min       Assessment/Plan   PT Assessment  PT Assessment Results: Decreased strength, Decreased range of motion, Decreased endurance, Impaired balance, Decreased mobility, Impaired sensation, Orthopedic restrictions  Rehab Prognosis: Good  Barriers to Discharge: none  Evaluation/Treatment Tolerance: Patient limited by fatigue  Medical Staff Made Aware: Yes  Strengths: Attitude of self, Coping skills, Physical health  Barriers to Participation:  (none)  End of Session Communication: Bedside nurse  Assessment Comment: The pt demonstrated functional progression with decreased assistance with bed mobility, transfers, amb, and increased amb distance using a wheeled walker.  End of Session Patient Position: Bed, 4 rail up, Alarm on  PT Plan  Inpatient/Swing Bed or Outpatient: Inpatient  PT Plan  Treatment/Interventions: Bed mobility, Transfer training, Gait training, Stair training, Balance training, Strengthening, Endurance training, Range of motion, Therapeutic exercise, Therapeutic activity, Home exercise program  PT Plan: Skilled PT  PT Frequency: Daily  PT Discharge Recommendations: High intensity level of continued care  Equipment Recommended upon Discharge:  (none)  PT Recommended Transfer Status: Assist x1  PT - OK to Discharge: Yes      General Visit Information:   PT  Visit  PT Received On: 05/07/24  Response to Previous Treatment: Patient with no complaints from previous session.  General  Prior to Session Communication: Bedside nurse  Patient Position Received: Bed, 4 rail up, Alarm on  Preferred Learning Style: verbal, visual, written  General Comment: Pt was pleasant, cooperative and willing to participate in therapy.    Subjective   Precautions:  Precautions  Hearing/Visual Limitations: Hearing and  vision WFL  Medical Precautions: Fall precautions, Spinal precautions  Precautions Comment: Pt in compliance with precautions throughout PT session.     Objective   Pain:  Pain Assessment  Pain Assessment: 0-10  Pain Score: 0 - No pain  Cognition:  Cognition  Overall Cognitive Status: Within Functional Limits  Orientation Level: Oriented X4  Postural Control:  Postural Control  Postural Control: Within Functional Limits  Posture Comment: Pt presented with good sitting and standing posture using a wheeled walker.  Static Sitting Balance  Static Sitting-Balance Support: Bilateral upper extremity supported, Feet supported  Static Sitting-Level of Assistance: Distant supervision  Dynamic Sitting Balance  Dynamic Sitting-Balance Support: Bilateral upper extremity supported, Feet supported  Dynamic Sitting-Comments: SBA  Static Standing Balance  Static Standing-Balance Support: Bilateral upper extremity supported  Static Standing-Level of Assistance: Close supervision  Static Standing-Comment/Number of Minutes: using a wheeled walker  Dynamic Standing Balance  Dynamic Standing-Balance Support: Bilateral upper extremity supported  Dynamic Standing-Comments: SBA using a wheeled walker.  Extremity/Trunk Assessments:        RUE   RUE : Within Functional Limits  LUE   LUE: Within Functional Limits  RLE   RLE : Within Functional Limits  LLE   LLE : Exceptions to WFL  AROM LLE (degrees)  LLE AROM Comment: WFL  Strength LLE  L Hip Flexion: 3-/5  L Knee Flexion: 3-/5  L Knee Extension: 3-/5  L Ankle Dorsiflexion: 4/5  L Ankle Plantar Flexion: 4/5  Activity Tolerance:  Activity Tolerance  Endurance: Decreased tolerance for upright activites  Treatments:  Therapeutic Exercise  Therapeutic Exercise Performed: Yes  Therapeutic Exercise Activity 1: ankle  pumps x 15  Therapeutic Exercise Activity 2: heel slides x 15  Therapeutic Exercise Activity 3: SAQ x 15  Therapeutic Exercise Activity 4: quad sets x 15  Therapeutic Exercise  Activity 5: Hip ABD x 15    Balance/Neuromuscular Re-Education  Balance/Neuromuscular Re-Education Activity Performed: Yes  Balance/Neuromuscular Re-Education Activity 1: supervision assist static sitting balance using Corey UE and LE support  Balance/Neuromuscular Re-Education Activity 2: SBA dynamic sitting balance using Corey UE and LE support.  Balance/Neuromuscular Re-Education Activity 3: SBA static standing balance using a wheeled walker.  Balance/Neuromuscular Re-Education Activity 4: SBA dynamic standing balance using a wheeled walker.    Bed Mobility  Bed Mobility: Yes  Bed Mobility 1  Bed Mobility 1: Supine to sitting  Level of Assistance 1: Moderate assistance  Bed Mobility Comments 1: log roll technique  Bed Mobility 2  Bed Mobility  2: Sitting to supine  Level of Assistance 2: Moderate assistance  Bed Mobility Comments 2: log roll technique    Ambulation/Gait Training  Ambulation/Gait Training Performed: Yes  Ambulation/Gait Training 1  Surface 1: Level tile  Device 1: Rolling walker  Assistance 1: Contact guard  Quality of Gait 1: Decreased step length, Soft knee(s) (unsteady, decreased boyd, decreased endurance)  Comments/Distance (ft) 1: 4ft lateral left  Transfers  Transfer: Yes  Transfer 1  Transfer From 1: Sit to  Transfer to 1: Stand  Transfer Device 1: Walker  Transfer Level of Assistance 1: Minimum assistance  Transfers 2  Transfer From 2: Stand to  Transfer to 2: Sit  Transfer Device 2: Walker  Transfer Level of Assistance 2: Close supervision    Outcome Measures:  [facility] Basic Mobility  Turning from your back to your side while in a flat bed without using bedrails: A lot  Moving from lying on your back to sitting on the side of a flat bed without using bedrails: A lot  Moving to and from bed to chair (including a wheelchair): A little  Standing up from a chair using your arms (e.g. wheelchair or bedside chair): A little  To walk in hospital room: A little  Climbing 3-5 steps with railing:  Total  Basic Mobility - Total Score: 14    Education Documentation  Precautions, taught by Miguel Torres, PT at 5/7/2024  1:23 PM.  Learner: Patient  Readiness: Acceptance  Method: Explanation, Demonstration  Response: Verbalizes Understanding, Demonstrated Understanding    Mobility Training, taught by Miguel Torres, PT at 5/7/2024  1:23 PM.  Learner: Patient  Readiness: Acceptance  Method: Explanation, Demonstration  Response: Verbalizes Understanding, Demonstrated Understanding    Education Comments  No comments found.      OP EDUCATION:  Outpatient Education  Individual(s) Educated: Patient  Education Provided: Body Mechanics, Fall Risk, Home Exercise Program, Home Safety, POC, Post-Op Precautions, Posture  Patient Response to Education: Patient/Caregiver Verbalized Understanding of Information, Patient/Caregiver Performed Return Demonstration of Exercises/Activities    Encounter Problems       Encounter Problems (Active)       Mobility       Pt will be Christiane for ambulation 50 ft with RW (Progressing)       Start:  05/03/24    Expected End:  05/17/24            Pt will be Christiane to ascend/descend 6 steps with 1 handrail (Progressing)       Start:  05/03/24    Expected End:  05/17/24               PT Transfers       Pt will be Christiane for sit to stand and bed to chair transfers with RW (Progressing)       Start:  05/03/24    Expected End:  05/17/24            Pt will be Christiane for bed mobility (Progressing)       Start:  05/03/24    Expected End:  05/17/24               Pain - Adult

## 2024-05-07 NOTE — PROGRESS NOTES
[facility] confirmed they are reviewing referral.ANAMIKA will continue to follow.    [id number] referral submitted to [facility] for review. Pending accepting AR.     1439-[facility] accepted. ANAMIKA updated patient. Patient confirmed he is agreeable to [facility]. SW requested CC to initiate precert and to contact patient to provide additional info about the facility. TCC notified. SW will continue to follow.    CONSTANCE Cordero

## 2024-05-08 LAB
ANION GAP SERPL CALC-SCNC: 15 MMOL/L (ref 10–20)
BUN SERPL-MCNC: 13 MG/DL (ref 6–23)
CALCIUM SERPL-MCNC: 9 MG/DL (ref 8.6–10.6)
CHLORIDE SERPL-SCNC: 106 MMOL/L (ref 98–107)
CO2 SERPL-SCNC: 21 MMOL/L (ref 21–32)
CREAT SERPL-MCNC: 0.6 MG/DL (ref 0.5–1.3)
EGFRCR SERPLBLD CKD-EPI 2021: >90 ML/MIN/1.73M*2
ERYTHROCYTE [DISTWIDTH] IN BLOOD BY AUTOMATED COUNT: 14.4 % (ref 11.5–14.5)
GLUCOSE SERPL-MCNC: 78 MG/DL (ref 74–99)
HCT VFR BLD AUTO: 44.1 % (ref 41–52)
HGB BLD-MCNC: 14.1 G/DL (ref 13.5–17.5)
MCH RBC QN AUTO: 27.3 PG (ref 26–34)
MCHC RBC AUTO-ENTMCNC: 32 G/DL (ref 32–36)
MCV RBC AUTO: 85 FL (ref 80–100)
NRBC BLD-RTO: 0 /100 WBCS (ref 0–0)
PLATELET # BLD AUTO: 312 X10*3/UL (ref 150–450)
POTASSIUM SERPL-SCNC: 5.1 MMOL/L (ref 3.5–5.3)
RBC # BLD AUTO: 5.17 X10*6/UL (ref 4.5–5.9)
SODIUM SERPL-SCNC: 137 MMOL/L (ref 136–145)
WBC # BLD AUTO: 16.1 X10*3/UL (ref 4.4–11.3)

## 2024-05-08 PROCEDURE — 85027 COMPLETE CBC AUTOMATED: CPT

## 2024-05-08 PROCEDURE — 80048 BASIC METABOLIC PNL TOTAL CA: CPT

## 2024-05-08 PROCEDURE — 2500000004 HC RX 250 GENERAL PHARMACY W/ HCPCS (ALT 636 FOR OP/ED)

## 2024-05-08 PROCEDURE — 2500000001 HC RX 250 WO HCPCS SELF ADMINISTERED DRUGS (ALT 637 FOR MEDICARE OP)

## 2024-05-08 PROCEDURE — 36415 COLL VENOUS BLD VENIPUNCTURE: CPT

## 2024-05-08 PROCEDURE — 1100000001 HC PRIVATE ROOM DAILY

## 2024-05-08 RX ORDER — ENOXAPARIN SODIUM 100 MG/ML
40 INJECTION SUBCUTANEOUS 2 TIMES DAILY
Status: DISCONTINUED | OUTPATIENT
Start: 2024-05-08 | End: 2024-05-11 | Stop reason: HOSPADM

## 2024-05-08 RX ADMIN — GABAPENTIN 300 MG: 300 CAPSULE ORAL at 05:53

## 2024-05-08 RX ADMIN — METHOCARBAMOL 500 MG: 500 TABLET ORAL at 20:51

## 2024-05-08 RX ADMIN — ACETAMINOPHEN 650 MG: 325 TABLET ORAL at 05:53

## 2024-05-08 RX ADMIN — ACETAMINOPHEN 650 MG: 325 TABLET ORAL at 00:45

## 2024-05-08 RX ADMIN — GABAPENTIN 300 MG: 300 CAPSULE ORAL at 21:03

## 2024-05-08 RX ADMIN — METHOCARBAMOL 500 MG: 500 TABLET ORAL at 08:42

## 2024-05-08 RX ADMIN — ENOXAPARIN SODIUM 40 MG: 100 INJECTION SUBCUTANEOUS at 14:05

## 2024-05-08 RX ADMIN — POLYETHYLENE GLYCOL 3350 17 G: 17 POWDER, FOR SOLUTION ORAL at 20:51

## 2024-05-08 RX ADMIN — DOCUSATE SODIUM 100 MG: 100 CAPSULE, LIQUID FILLED ORAL at 20:50

## 2024-05-08 RX ADMIN — ENOXAPARIN SODIUM 40 MG: 100 INJECTION SUBCUTANEOUS at 20:51

## 2024-05-08 RX ADMIN — ACETAMINOPHEN 650 MG: 325 TABLET ORAL at 18:08

## 2024-05-08 RX ADMIN — GABAPENTIN 300 MG: 300 CAPSULE ORAL at 14:04

## 2024-05-08 RX ADMIN — METHOCARBAMOL 500 MG: 500 TABLET ORAL at 14:05

## 2024-05-08 RX ADMIN — ACETAMINOPHEN 650 MG: 325 TABLET ORAL at 11:27

## 2024-05-08 ASSESSMENT — PAIN SCALES - GENERAL
PAINLEVEL_OUTOF10: 0 - NO PAIN
PAINLEVEL_OUTOF10: 0 - NO PAIN

## 2024-05-08 ASSESSMENT — PAIN - FUNCTIONAL ASSESSMENT: PAIN_FUNCTIONAL_ASSESSMENT: 0-10

## 2024-05-08 NOTE — PROGRESS NOTES
ANAMIKA sent updates to [facility] for precert. ANAMIKA will continue to follow.    CONSTANCE Cordero

## 2024-05-08 NOTE — PROGRESS NOTES
"Orthopaedic Spine Surgery Progress Note    S:  Doing well this morning, no complaints apart from wanting castaneda daria'ed. Says he feels like he could urinate independently. Continues to feel like he is improving. Was able to stand with PT yesterday, still with a lot of difficulty mobilizing. Requires A LOT of encouragement. Patient very dependent on staff.   O:  /76   Pulse 95   Temp 37.1 °C (98.8 °F) (Tympanic)   Resp 16   Ht 1.956 m (6' 5\")   Wt (!) 214 kg (470 lb 15.9 oz)   SpO2 93%   BMI 55.85 kg/m²     Gen: arousable, NAD, appropriately conversational  Cardiac: RRR to peripheral palpation  Resp: nonlabored on RA  GI: soft, nondistended    MSK:    Spine Exam:  Provena holding suction    L1: SILT       L2: SILT      Hip flexors 5/5 Left; 5/5 Right  L3: SILT      Knee extension 5/5 Left; 5/5 Right  L4: SILT      Tib Ant. (Dorsiflexion) 5/5 Left; 5/5 Right  L5: SILT      EHL 5/5 Left; 5/5 Right  S1: SILT      Plantarflexion 5/5 Left; 5/5 Right    Patellar reflex: 2+   Bilaterally    Babinkski: Intact  No clonus    Labs:  Results for orders placed or performed during the hospital encounter of 04/30/24 (from the past 24 hour(s))   Basic metabolic panel   Result Value Ref Range    Glucose 78 74 - 99 mg/dL    Sodium 137 136 - 145 mmol/L    Potassium 5.1 3.5 - 5.3 mmol/L    Chloride 106 98 - 107 mmol/L    Bicarbonate 21 21 - 32 mmol/L    Anion Gap 15 10 - 20 mmol/L    Urea Nitrogen 13 6 - 23 mg/dL    Creatinine 0.60 0.50 - 1.30 mg/dL    eGFR >90 >60 mL/min/1.73m*2    Calcium 9.0 8.6 - 10.6 mg/dL   CBC   Result Value Ref Range    WBC 16.1 (H) 4.4 - 11.3 x10*3/uL    nRBC 0.0 0.0 - 0.0 /100 WBCs    RBC 5.17 4.50 - 5.90 x10*6/uL    Hemoglobin 14.1 13.5 - 17.5 g/dL    Hematocrit 44.1 41.0 - 52.0 %    MCV 85 80 - 100 fL    MCH 27.3 26.0 - 34.0 pg    MCHC 32.0 32.0 - 36.0 g/dL    RDW 14.4 11.5 - 14.5 %    Platelets 312 150 - 450 x10*3/uL           A/P: 24 y.o. male with severe stenosis at L4-5 from congenital lumbar " stenosis and disc herniation s/p L4-5 lami on 5/2 with Dr. Grier. Doing well postop.       Plan:    - Weightbearing status: activity ad trent, highly encourage out of bed and ambulation as soon as possible, prioritize time in chair. Avoid lying on incision. Need to offload incision  - Precautions: no bending, twisting, lifting >10 lbs  - Imaging: none required  - Pain: multimodal regimen including Tylenol, oxycodone, Dilaudid, robaxin  - DVT prophylaxis: SCDs, ambulation; start dvt chemoprophylaxis  - Dressing: Provena replaced 5/7  - Drain: DC'ed  - Godoy: TOV today  - FEN: maintenance IV fluids; HLIV with good PO intake  - Diet: Reg  - Pulm: encourage IS, maintain O2 sat >92%  - Bowel Regimen  - PT/OT consult  - Daily CBC, BMP     Dispo: Rec'ed acute rehab, pending EDWARD bernard MD  Orthopaedic Surgery, PGY-2  Available by Epic Chat  05/08/24  8:41 AM    This patient will be followed by the Orthopaedic Spine service. Please page or Epic Chat the corresponding residents below with questions or concerns.     Ortho Spine Service ([employer] Chat Preferred)    First call: Rigoberto Ng, PGY2  Second call: Ar Edwards, PGY4      I saw and evaluated the patient.  I personally obtained the key and critical portions of the history and physical exam or was physically present for key and critical portions performed by the Resident. I reviewed the documentation and discussed the patient with the Resident.  I agree with the Resident’s medical decision making as documented in the note.    Will trial void today. Encourage patient to stand up to urinate.   Continue to encourage mobilization  Waiting for placement

## 2024-05-09 LAB
ANION GAP SERPL CALC-SCNC: 18 MMOL/L (ref 10–20)
BUN SERPL-MCNC: 20 MG/DL (ref 6–23)
CALCIUM SERPL-MCNC: 8.7 MG/DL (ref 8.6–10.6)
CHLORIDE SERPL-SCNC: 103 MMOL/L (ref 98–107)
CO2 SERPL-SCNC: 24 MMOL/L (ref 21–32)
CREAT SERPL-MCNC: 0.69 MG/DL (ref 0.5–1.3)
EGFRCR SERPLBLD CKD-EPI 2021: >90 ML/MIN/1.73M*2
ERYTHROCYTE [DISTWIDTH] IN BLOOD BY AUTOMATED COUNT: 14.7 % (ref 11.5–14.5)
GLUCOSE SERPL-MCNC: 64 MG/DL (ref 74–99)
HCT VFR BLD AUTO: 50.6 % (ref 41–52)
HGB BLD-MCNC: 14.8 G/DL (ref 13.5–17.5)
MCH RBC QN AUTO: 27.9 PG (ref 26–34)
MCHC RBC AUTO-ENTMCNC: 29.2 G/DL (ref 32–36)
MCV RBC AUTO: 95 FL (ref 80–100)
NRBC BLD-RTO: 0 /100 WBCS (ref 0–0)
PLATELET # BLD AUTO: 264 X10*3/UL (ref 150–450)
POTASSIUM SERPL-SCNC: 4.6 MMOL/L (ref 3.5–5.3)
RBC # BLD AUTO: 5.31 X10*6/UL (ref 4.5–5.9)
SODIUM SERPL-SCNC: 140 MMOL/L (ref 136–145)
WBC # BLD AUTO: 13.9 X10*3/UL (ref 4.4–11.3)

## 2024-05-09 PROCEDURE — 97535 SELF CARE MNGMENT TRAINING: CPT | Mod: GO

## 2024-05-09 PROCEDURE — 84520 ASSAY OF UREA NITROGEN: CPT

## 2024-05-09 PROCEDURE — 85027 COMPLETE CBC AUTOMATED: CPT

## 2024-05-09 PROCEDURE — 97116 GAIT TRAINING THERAPY: CPT | Mod: GP

## 2024-05-09 PROCEDURE — 2500000004 HC RX 250 GENERAL PHARMACY W/ HCPCS (ALT 636 FOR OP/ED)

## 2024-05-09 PROCEDURE — 36415 COLL VENOUS BLD VENIPUNCTURE: CPT

## 2024-05-09 PROCEDURE — 97530 THERAPEUTIC ACTIVITIES: CPT | Mod: GP

## 2024-05-09 PROCEDURE — 1100000001 HC PRIVATE ROOM DAILY

## 2024-05-09 PROCEDURE — 97530 THERAPEUTIC ACTIVITIES: CPT | Mod: GO

## 2024-05-09 PROCEDURE — 97110 THERAPEUTIC EXERCISES: CPT | Mod: GP

## 2024-05-09 PROCEDURE — 2500000001 HC RX 250 WO HCPCS SELF ADMINISTERED DRUGS (ALT 637 FOR MEDICARE OP)

## 2024-05-09 RX ORDER — OXYCODONE HYDROCHLORIDE 5 MG/1
5 TABLET ORAL EVERY 6 HOURS PRN
Qty: 28 TABLET | Refills: 0 | Status: SHIPPED | OUTPATIENT
Start: 2024-05-09 | End: 2024-05-16

## 2024-05-09 RX ORDER — ENOXAPARIN SODIUM 100 MG/ML
40 INJECTION SUBCUTANEOUS 2 TIMES DAILY
Qty: 60 EACH | Refills: 0 | Status: SHIPPED | OUTPATIENT
Start: 2024-05-09 | End: 2024-06-11 | Stop reason: HOSPADM

## 2024-05-09 RX ORDER — DOCUSATE SODIUM 100 MG/1
100 CAPSULE, LIQUID FILLED ORAL 2 TIMES DAILY
Qty: 60 CAPSULE | Refills: 0 | Status: SHIPPED | OUTPATIENT
Start: 2024-05-09 | End: 2024-06-11 | Stop reason: HOSPADM

## 2024-05-09 RX ORDER — METHOCARBAMOL 500 MG/1
500 TABLET, FILM COATED ORAL 3 TIMES DAILY PRN
Qty: 42 TABLET | Refills: 0 | Status: SHIPPED | OUTPATIENT
Start: 2024-05-09 | End: 2024-05-23

## 2024-05-09 RX ORDER — POLYETHYLENE GLYCOL 3350 17 G/17G
17 POWDER, FOR SOLUTION ORAL EVERY 12 HOURS
Qty: 60 PACKET | Refills: 0 | Status: SHIPPED | OUTPATIENT
Start: 2024-05-09 | End: 2024-06-11 | Stop reason: HOSPADM

## 2024-05-09 RX ADMIN — METHOCARBAMOL 500 MG: 500 TABLET ORAL at 08:58

## 2024-05-09 RX ADMIN — DOCUSATE SODIUM 100 MG: 100 CAPSULE, LIQUID FILLED ORAL at 08:58

## 2024-05-09 RX ADMIN — ENOXAPARIN SODIUM 40 MG: 100 INJECTION SUBCUTANEOUS at 20:18

## 2024-05-09 RX ADMIN — ENOXAPARIN SODIUM 40 MG: 100 INJECTION SUBCUTANEOUS at 08:58

## 2024-05-09 RX ADMIN — GABAPENTIN 300 MG: 300 CAPSULE ORAL at 05:17

## 2024-05-09 RX ADMIN — METHOCARBAMOL 500 MG: 500 TABLET ORAL at 20:19

## 2024-05-09 RX ADMIN — DOCUSATE SODIUM 100 MG: 100 CAPSULE, LIQUID FILLED ORAL at 20:18

## 2024-05-09 RX ADMIN — METHOCARBAMOL 500 MG: 500 TABLET ORAL at 13:31

## 2024-05-09 RX ADMIN — ACETAMINOPHEN 650 MG: 325 TABLET ORAL at 20:18

## 2024-05-09 RX ADMIN — GABAPENTIN 300 MG: 300 CAPSULE ORAL at 13:30

## 2024-05-09 RX ADMIN — ACETAMINOPHEN 650 MG: 325 TABLET ORAL at 13:30

## 2024-05-09 RX ADMIN — ACETAMINOPHEN 650 MG: 325 TABLET ORAL at 05:17

## 2024-05-09 RX ADMIN — POLYETHYLENE GLYCOL 3350 17 G: 17 POWDER, FOR SOLUTION ORAL at 20:18

## 2024-05-09 RX ADMIN — ACETAMINOPHEN 650 MG: 325 TABLET ORAL at 00:05

## 2024-05-09 RX ADMIN — GABAPENTIN 300 MG: 300 CAPSULE ORAL at 22:34

## 2024-05-09 ASSESSMENT — COGNITIVE AND FUNCTIONAL STATUS - GENERAL
DRESSING REGULAR LOWER BODY CLOTHING: A LITTLE
HELP NEEDED FOR BATHING: A LOT
TOILETING: A LOT
DRESSING REGULAR UPPER BODY CLOTHING: A LITTLE
MOBILITY SCORE: 15
MOBILITY SCORE: 16
TURNING FROM BACK TO SIDE WHILE IN FLAT BAD: A LITTLE
MOVING FROM LYING ON BACK TO SITTING ON SIDE OF FLAT BED WITH BEDRAILS: A LITTLE
WALKING IN HOSPITAL ROOM: A LITTLE
CLIMB 3 TO 5 STEPS WITH RAILING: TOTAL
MOVING TO AND FROM BED TO CHAIR: A LITTLE
DAILY ACTIVITIY SCORE: 16
CLIMB 3 TO 5 STEPS WITH RAILING: TOTAL
DRESSING REGULAR LOWER BODY CLOTHING: A LOT
MOVING FROM LYING ON BACK TO SITTING ON SIDE OF FLAT BED WITH BEDRAILS: A LITTLE
STANDING UP FROM CHAIR USING ARMS: A LITTLE
PERSONAL GROOMING: A LITTLE
DAILY ACTIVITIY SCORE: 17
MOVING TO AND FROM BED TO CHAIR: A LITTLE
WALKING IN HOSPITAL ROOM: A LOT
TURNING FROM BACK TO SIDE WHILE IN FLAT BAD: A LITTLE
TOILETING: TOTAL
STANDING UP FROM CHAIR USING ARMS: A LITTLE
DRESSING REGULAR UPPER BODY CLOTHING: A LITTLE
HELP NEEDED FOR BATHING: A LOT

## 2024-05-09 ASSESSMENT — ACTIVITIES OF DAILY LIVING (ADL)
HOME_MANAGEMENT_TIME_ENTRY: 25
BATHING_WHERE_ASSESSED: BED LEVEL
BATHING_LEVEL_OF_ASSISTANCE: MAXIMUM ASSISTANCE

## 2024-05-09 ASSESSMENT — PAIN - FUNCTIONAL ASSESSMENT
PAIN_FUNCTIONAL_ASSESSMENT: 0-10

## 2024-05-09 ASSESSMENT — PAIN SCALES - GENERAL
PAINLEVEL_OUTOF10: 0 - NO PAIN

## 2024-05-09 NOTE — PROGRESS NOTES
"Orthopaedic Spine Surgery Progress Note    S:  Doing well this morning, no complaints. Had some urinary retention overnight but unclear whether positional/secondary to body habitus vs bladder origin. Says he can feel when he needs to go, encouraged him to sit up/go to bedside commode when he needs to urinate. Was able to walk a little yesterday.     O:  /83 (BP Location: Left arm, Patient Position: Lying)   Pulse 101   Temp 37 °C (98.6 °F) (Temporal)   Resp 18   Ht 1.956 m (6' 5\")   Wt (!) 214 kg (470 lb 15.9 oz)   SpO2 95%   BMI 55.85 kg/m²     Gen: arousable, NAD, appropriately conversational  Cardiac: RRR to peripheral palpation  Resp: nonlabored on RA  GI: soft, nondistended    MSK:    Spine Exam:  Provena holding suction    L1: SILT       L2: SILT      Hip flexors 5/5 Left; 5/5 Right  L3: SILT      Knee extension 5/5 Left; 5/5 Right  L4: SILT      Tib Ant. (Dorsiflexion) 5/5 Left; 5/5 Right  L5: SILT      EHL 5/5 Left; 5/5 Right  S1: SILT      Plantarflexion 5/5 Left; 5/5 Right    Patellar reflex: 2+   Bilaterally    Babinkski: Intact  No clonus    Labs:  No results found for this or any previous visit (from the past 24 hour(s)).          A/P: 24 y.o. male with severe stenosis at L4-5 from congenital lumbar stenosis and disc herniation s/p L4-5 lami on 5/2 with Dr. Grier. Doing well postop.       Plan:    - Weightbearing status: activity ad trent, highly encourage out of bed and ambulation as soon as possible, prioritize time in chair. Avoid lying on incision. Need to offload incision  - Precautions: no bending, twisting, lifting >10 lbs  - Imaging: none required  - Pain: multimodal regimen including Tylenol, oxycodone, Dilaudid, robaxin  - DVT prophylaxis: SCDs, ambulation; start dvt chemoprophylaxis  - Dressing: Provena replaced 5/7  - Drain: DC'ed  - Godoy: out, hold off on replacing, patient needs to be stood up to urinate as much as possible  - FEN: maintenance IV fluids; HLIV with good PO " intake  - Diet: Reg  - Pulm: encourage IS, maintain O2 sat >92%  - Bowel Regimen  - PT/OT consult  - Daily CBC, BMP     Dispo: Rec'ed acute rehab, pending EDWARD bernard MD  Orthopaedic Surgery, PGY-2  Available by Epic Chat  05/09/24  8:04 AM    This patient will be followed by the Orthopaedic Spine service. Please page or Epic Chat the corresponding residents below with questions or concerns.     Ortho Spine Service ([employer] Chat Preferred)    First call: Rigoberto Ng, PGY2  Second call: Ar Edwards, PGY4      I saw and evaluated the patient.  I personally obtained the key and critical portions of the history and physical exam or was physically present for key and critical portions performed by the Resident. I reviewed the documentation and discussed the patient with the Resident.  I agree with the Resident’s medical decision making as documented in the note.

## 2024-05-09 NOTE — PROGRESS NOTES
[facility] [place] reports insurance has intent to deny precert. Pending P2P info. TCC aware. SW updated patient. Patient is agreeable to SNF if P2P is denied. SW provided SNF list. Patient is agreeable to referrals being submitted to SNFs near his home. Referrals submitted for review. ANAMIKA will continue to follow.    CONSTANCE Cordero

## 2024-05-09 NOTE — PROGRESS NOTES
Physical Therapy    Physical Therapy Treatment    Patient Name: Erwin Cline  MRN: [id number]  Today's Date: 5/9/2024  Time Calculation  Start Time: 1235  Stop Time: 1329  Time Calculation (min): 54 min    Assessment/Plan   PT Assessment  PT Assessment Results: Decreased strength, Decreased range of motion, Decreased endurance, Impaired balance, Decreased mobility, Impaired sensation, Orthopedic restrictions  Rehab Prognosis: Good  Barriers to Discharge: none  Evaluation/Treatment Tolerance: Patient limited by fatigue  Medical Staff Made Aware: Yes  Strengths: Attitude of self, Coping skills, Physical health  Barriers to Participation:  (none)  End of Session Communication: Bedside nurse  Assessment Comment: The pt demonstrated functional progression with decreased assistance with bed mobility, transfers, amb, and increased amb distance using a wheeled walker.  End of Session Patient Position: Bed, 4 rail up, Alarm on  PT Plan  Inpatient/Swing Bed or Outpatient: Inpatient  PT Plan  Treatment/Interventions: Bed mobility, Transfer training, Gait training, Stair training, Balance training, Strengthening, Endurance training, Range of motion, Therapeutic exercise, Therapeutic activity, Home exercise program  PT Plan: Skilled PT  PT Frequency: Daily  PT Discharge Recommendations: High intensity level of continued care  Equipment Recommended upon Discharge:  (none)  PT Recommended Transfer Status: Assist x1  PT - OK to Discharge: Yes      General Visit Information:   PT  Visit  PT Received On: 05/09/24  Response to Previous Treatment: Patient reporting fatigue but able to participate.  General  Prior to Session Communication: Bedside nurse  Patient Position Received: Bed, 4 rail up, Alarm on  Preferred Learning Style: verbal, visual, written  General Comment: The pt was pleasant, cooperative and willing to participate in therapy.    Subjective   Precautions:  Precautions  Hearing/Visual Limitations: Hearing and  vision WFL  Medical Precautions: Fall precautions, Spinal precautions  Precautions Comment: Pt in compliance with precautions throughout PT session.     Objective   Pain:  Pain Assessment  Pain Assessment: 0-10  Pain Score: 0 - No pain  Cognition:  Cognition  Overall Cognitive Status: Within Functional Limits  Orientation Level: Oriented X4  Postural Control:  Postural Control  Postural Control: Within Functional Limits  Posture Comment: Pt presented with good sitting and standing posture using a wheeled walker.  Static Sitting Balance  Static Sitting-Balance Support: Bilateral upper extremity supported, Feet supported  Static Sitting-Level of Assistance: Distant supervision  Dynamic Sitting Balance  Dynamic Sitting-Balance Support: Bilateral upper extremity supported, Feet supported  Dynamic Sitting-Comments: SBA  Static Standing Balance  Static Standing-Balance Support: Bilateral upper extremity supported  Static Standing-Level of Assistance: Close supervision  Static Standing-Comment/Number of Minutes: using a wheeled walker  Dynamic Standing Balance  Dynamic Standing-Balance Support: Bilateral upper extremity supported  Dynamic Standing-Comments: SBA using a wheeled walker.  Extremity/Trunk Assessments:        RUE   RUE : Within Functional Limits  LUE   LUE: Within Functional Limits  RLE   RLE : Exceptions to WFL  AROM RLE (degrees)  RLE AROM Comment: WFL  Strength RLE  R Hip Flexion: 3/5  R Knee Flexion: 3+/5  R Knee Extension: 3+/5  R Ankle Dorsiflexion: 4-/5  R Ankle Plantar Flexion: 4-/5  AROM LLE (degrees)  LLE AROM Comment: WFL  Strength LLE  L Hip Flexion: 3-/5  L Knee Flexion: 3-/5  L Knee Extension: 3-/5  L Ankle Dorsiflexion: 4-/5  L Ankle Plantar Flexion: 4-/5  Activity Tolerance:  Activity Tolerance  Endurance: Decreased tolerance for upright activites  Treatments:  Therapeutic Exercise  Therapeutic Exercise Performed: Yes  Therapeutic Exercise Activity 1: ankle  pumps x 15  Therapeutic Exercise  Activity 2: heel slides x 15  Therapeutic Exercise Activity 3: SAQ x 15  Therapeutic Exercise Activity 4: quad sets x 15  Therapeutic Exercise Activity 5: Hip ABD x 15  Therapeutic Exercise Activity 6: glute sets x 15    Therapeutic Activity  Therapeutic Activity Performed: Yes  Therapeutic Activity 1: Pt sat EOB x ~20 min.  Therapeutic Activity 2: The pt performed gait training with forward amb parallel to the bed.    Balance/Neuromuscular Re-Education  Balance/Neuromuscular Re-Education Activity Performed: Yes  Balance/Neuromuscular Re-Education Activity 1: Supervision assist static sitting balance using Corey UE and LE support.  Balance/Neuromuscular Re-Education Activity 2: SBA dynamic sitting balance using Corey UE and LE support.  Balance/Neuromuscular Re-Education Activity 3: SBA static standing balance using a wheeled walker.  Balance/Neuromuscular Re-Education Activity 4: SBA dynamic standing balance using a wheeled walker.    Bed Mobility  Bed Mobility: Yes  Bed Mobility 1  Bed Mobility 1: Supine to sitting  Level of Assistance 1: Close supervision  Bed Mobility Comments 1: lateral roll technique  Bed Mobility 2  Bed Mobility  2: Sitting to supine  Level of Assistance 2: Minimum assistance  Bed Mobility Comments 2: log roll technique    Ambulation/Gait Training  Ambulation/Gait Training Performed: Yes  Ambulation/Gait Training 1  Surface 1: Level tile  Device 1: Rolling walker  Assistance 1: Close supervision  Quality of Gait 1: Decreased step length, Soft knee(s) (slightly unsteady, decreased boyd, decreased endurance)  Comments/Distance (ft) 1: 6ft x 3  Transfers  Transfer: Yes  Transfer 1  Transfer From 1: Sit to  Transfer to 1: Stand  Transfer Device 1: Walker  Transfer Level of Assistance 1: Close supervision  Transfers 2  Transfer From 2: Stand to  Transfer to 2: Sit  Transfer Device 2: Walker  Transfer Level of Assistance 2: Close supervision    Outcome Measures:  [facility] Basic Mobility  Turning from  your back to your side while in a flat bed without using bedrails: A little  Moving from lying on your back to sitting on the side of a flat bed without using bedrails: A little  Moving to and from bed to chair (including a wheelchair): A little  Standing up from a chair using your arms (e.g. wheelchair or bedside chair): A little  To walk in hospital room: A little  Climbing 3-5 steps with railing: Total  Basic Mobility - Total Score: 16    Education Documentation  Precautions, taught by Miguel Torres PT at 5/9/2024  1:51 PM.  Learner: Patient  Readiness: Acceptance  Method: Explanation, Demonstration  Response: Verbalizes Understanding, Demonstrated Understanding    Mobility Training, taught by Miguel Torres PT at 5/9/2024  1:51 PM.  Learner: Patient  Readiness: Acceptance  Method: Explanation, Demonstration  Response: Verbalizes Understanding, Demonstrated Understanding    Education Comments  No comments found.      OP EDUCATION:  Outpatient Education  Individual(s) Educated: Patient  Education Provided: Body Mechanics, Fall Risk, Home Exercise Program, Home Safety, POC, Post-Op Precautions, Posture  Patient Response to Education: Patient/Caregiver Verbalized Understanding of Information, Patient/Caregiver Performed Return Demonstration of Exercises/Activities    Encounter Problems       Encounter Problems (Active)       Mobility       Pt will be Christiane for ambulation 50 ft with RW (Progressing)       Start:  05/03/24    Expected End:  05/17/24            Pt will be Christiane to ascend/descend 6 steps with 1 handrail (Progressing)       Start:  05/03/24    Expected End:  05/17/24               PT Transfers       Pt will be Christiane for sit to stand and bed to chair transfers with RW (Met)       Start:  05/03/24    Expected End:  05/17/24    Resolved:  05/09/24    Updated to: Pt will be supervision assist for sit to stand and bed to chair transfers with RW    Update reason: Pt demonstrated functional progression.          Pt will be Christiane for bed mobility (Met)       Start:  05/03/24    Expected End:  05/17/24    Resolved:  05/09/24    Updated to: Pt will be supervision assist for bed mobility    Update reason: Pt demonstrated functional progression.         Pt will be supervision assist for sit to stand and bed to chair transfers with RW (Progressing)       Start:  05/09/24    Expected End:  05/17/24                Pt will be supervision assist for bed mobility (Progressing)       Start:  05/09/24    Expected End:  05/17/24                   Pain - Adult

## 2024-05-09 NOTE — PROGRESS NOTES
Occupational Therapy    Occupational Therapy Treatment    Name: Erwin Cline  MRN: [id number]  : 2000  Date: 24  Room: [address] [place], [place] [address]      Time Calculation  Start Time: 945  Stop Time: 3  Time Calculation (min): 38 min    Assessment:  OT Assessment: Pt continues to demo decreased IND in ADLs, IADLs, and functional mobility/transfers and would benefit from continued therapy intervention post d/c to maximize functional independence and safety prior to homegoing.  Prognosis: Good  Evaluation/Treatment Tolerance: Patient tolerated treatment well  End of Session Communication: Bedside nurse  End of Session Patient Position: Bed, 4 rail up, Alarm on  Plan:  Treatment Interventions: ADL retraining, Functional transfer training, UE strengthening/ROM, Endurance training, Neuromuscular reeducation, Compensatory technique education, Equipment evaluation/education  OT Frequency: 3 times per week  OT Discharge Recommendations: High intensity level of continued care  Equipment Recommended upon Discharge: Wheeled walker  OT Recommended Transfer Status: Assist of 2  OT - OK to Discharge: Yes    Subjective   General:  OT Last Visit  OT Received On: 24  Reason for Referral: Herniated lumbar disc without myelopathy, L4-5 lami/discectomy  Past Medical History Relevant to Rehab: 23 yo M (morbid obesity) presents w LBP flare up, spontaneously developed SA and urinary incontinence x1 at outside ED.  History of ADHD, learning disability  Prior to Session Communication: Bedside nurse  Patient Position Received: Bed, 4 rail up, Alarm on  Family/Caregiver Present: No  General Comment: Pt supine in bed on arrival. Pleasant & active particiant in tx.   Precautions:  Medical Precautions: Fall precautions, Spinal precautions  Post-Surgical Precautions: Spinal precautions    Cognition:  Overall Cognitive Status: Within Functional Limits  Orientation Level: Oriented X4    Pain Assessment:  Pain Assessment  Pain  Assessment: 0-10  Pain Score: 0 - No pain     Objective   Activities of Daily Living:      Grooming  Grooming Level of Assistance: Independent  Grooming Where Assessed: Edge of bed  Grooming Comments: pt washed face & applied deordorant. Items within reach on tray table.    UE Bathing  UE Bathing Level of Assistance: Close supervision, Minimal verbal cues  UE Bathing Where Assessed: Edge of bed  UE Bathing Comments: pt performed UB sponge bathing, cued for thoroughness.    LE Bathing  LE Bathing Level of Assistance: Maximum assistance  LE Bathing Where Assessed: Bed level  LE Bathing Comments: Pt able to wash proximal front of thighs. Assist to wash remainder of LEs.    UE Dressing  UE Dressing Level of Assistance: Minimum assistance  UE Dressing Where Assessed: Edge of bed  UE Dressing Comments: assist to manage tie posteriorly    LE Dressing  LE Dressing: Yes  Sock Level of Assistance: Dependent  LE Dressing Where Assessed: Bed level    Functional Standing Tolerance:  Functional Standing Tolerance  Time: </= 1 minute  Activity: statc standing while RN assisted with richard hygiene  Functional Mobility  Functional Mobility Performed: Yes  Functional Mobility 1  Surface 1: Level tile  Device 1: Bariatric rolling walker  Assistance 1: Minimum assistance  Comments 1: Pt performed L lateral side steps x5, cued for safe walker management  Bed Mobility/Transfers:     Bed Mobility  Bed Mobility: Yes  Bed Mobility 1  Bed Mobility 1: Supine to sitting  Level of Assistance 1: Minimum assistance  Bed Mobility Comments 1: cued for log roll sequencing, HOB miniually elevated  Bed Mobility 2  Bed Mobility  2: Sitting to supine  Level of Assistance 2: Maximum assistance  Bed Mobility Comments 2: heavy assist to elevate B LEs up into bed. Cued for log roll.    Transfers  Transfer: Yes  Transfer 1  Transfer From 1: Bed to  Transfer to 1: Stand  Technique 1: Sit to stand  Transfer Device 1: Walker  Transfer Level of Assistance 1:  Minimum assistance, Minimal verbal cues  Trials/Comments 1: x2 from EOB  Transfers 2  Transfer From 2: Stand to  Transfer to 2: Bed  Technique 2: Stand to sit  Transfer Device 2: Walker  Transfer Level of Assistance 2: Minimum assistance, Minimal verbal cues    Balance:  Dynamic Sitting Balance  Dynamic Sitting-Balance Support: No upper extremity supported  Dynamic Sitting-Balance: Reaching for objects, Reaching across midline  Dynamic Sitting-Comments: supervision  Dynamic Standing Balance  Dynamic Standing-Balance Support: Bilateral upper extremity supported  Dynamic Standing-Comments: Min A  Static Standing Balance  Static Standing-Balance Support: Bilateral upper extremity supported  Static Standing-Level of Assistance: Contact guard    Therapy/Activity:      Therapeutic Activity  Therapeutic Activity Performed: Yes  Therapeutic Activity 1: To increase OOB activity tolerance needed for ADL completion, patient sat at EOB x~25 minutes with close supervision while performing ADLs & transfer training.       Outcome Measures:  [facility] Daily Activity  Putting on and taking off regular lower body clothing: A lot  Bathing (including washing, rinsing, drying): A lot  Putting on and taking off regular upper body clothing: A little  Toileting, which includes using toilet, bedpan or urinal: Total  Taking care of personal grooming such as brushing teeth: None  Eating Meals: None  Daily Activity - Total Score: 16     Education Documentation  Body Mechanics, taught by Christal Brothers OT at 5/9/2024 11:16 AM.  Learner: Patient  Readiness: Acceptance  Method: Explanation  Response: Verbalizes Understanding, Needs Reinforcement    Precautions, taught by Christal Brothers OT at 5/9/2024 11:16 AM.  Learner: Patient  Readiness: Acceptance  Method: Explanation  Response: Verbalizes Understanding, Needs Reinforcement    ADL Training, taught by Christal Brothers OT at 5/9/2024 11:16 AM.  Learner: Patient  Readiness: Acceptance  Method:  Explanation  Response: Verbalizes Understanding, Needs Reinforcement    Education Comments  No comments found.        Goals:  Encounter Problems       Encounter Problems (Active)       ADLs       Patient will perform UB and LB bathing  with minimal assist  level of assistance and long-handled sponge. (Progressing)       Start:  05/03/24    Expected End:  05/17/24            Patient with complete lower body dressing with minimal assist  level of assistance donning and doffing all LE clothes  with PRN adaptive equipment. (Progressing)       Start:  05/03/24    Expected End:  05/17/24            Patient will complete daily grooming tasks  with modified independent level of assistance and PRN adaptive equipment while standing. (Progressing)       Start:  05/03/24    Expected End:  05/17/24            Patient will complete toileting including hygiene clothing management/hygiene with minimal assist  level of assistance and AE/DME as needed. (Progressing)       Start:  05/03/24    Expected End:  05/17/24               BALANCE       Pt will maintain dynamic standing balance during ADL task with minimal assist  level of assistance in order to demonstrate decreased risk of falling and improved postural control. (Progressing)       Start:  05/03/24    Expected End:  05/17/24               TRANSFERS       Patient will perform bed mobility moderate assist level of assistance and bed rails in order to improve safety and independence with mobility (Progressing)       Start:  05/03/24    Expected End:  05/17/24            Patient will complete functional transfers with least restrictive device with minimal assist  level of assistance. (Progressing)       Start:  05/03/24    Expected End:  05/17/24 05/09/24 at 11:17 AM   Christal Brothers, OT   [phone]

## 2024-05-09 NOTE — CARE PLAN
The patient's goals for the shift include mobility increased    The clinical goals for the shift include increased mobility, pain control, safety    Over the shift, the patient did not make progress toward the following goals. Barriers to progression include none. Recommendations to address these barriers include none  Problem: Pain - Adult  Goal: Verbalizes/displays adequate comfort level or baseline comfort level  Outcome: Progressing     Problem: Safety - Adult  Goal: Free from fall injury  Outcome: Progressing     Problem: Discharge Planning  Goal: Discharge to home or other facility with appropriate resources  Outcome: Progressing     Problem: Chronic Conditions and Co-morbidities  Goal: Patient's chronic conditions and co-morbidity symptoms are monitored and maintained or improved  Outcome: Progressing     Problem: Skin  Goal: Decreased wound size/increased tissue granulation at next dressing change  Outcome: Progressing  Goal: Participates in plan/prevention/treatment measures  Outcome: Progressing  Goal: Prevent/manage excess moisture  Outcome: Progressing  Goal: Prevent/minimize sheer/friction injuries  Outcome: Progressing  Goal: Promote/optimize nutrition  Outcome: Progressing  Goal: Promote skin healing  Outcome: Progressing     Problem: Pain  Goal: Takes deep breaths with improved pain control throughout the shift  Outcome: Progressing  Goal: Turns in bed with improved pain control throughout the shift  Outcome: Progressing  Goal: Walks with improved pain control throughout the shift  Outcome: Progressing  Goal: Performs ADL's with improved pain control throughout shift  Outcome: Progressing  Goal: Participates in PT with improved pain control throughout the shift  Outcome: Progressing  Goal: Free from opioid side effects throughout the shift  Outcome: Progressing  Goal: Free from acute confusion related to pain meds throughout the shift  Outcome: Progressing     Problem: Fall/Injury  Goal: Not fall by  end of shift  Outcome: Progressing  Goal: Be free from injury by end of the shift  Outcome: Progressing  Goal: Verbalize understanding of personal risk factors for fall in the hospital  Outcome: Progressing  Goal: Verbalize understanding of risk factor reduction measures to prevent injury from fall in the home  Outcome: Progressing  Goal: Use assistive devices by end of the shift  Outcome: Progressing  Goal: Pace activities to prevent fatigue by end of the shift  Outcome: Progressing   .

## 2024-05-10 PROCEDURE — 2500000001 HC RX 250 WO HCPCS SELF ADMINISTERED DRUGS (ALT 637 FOR MEDICARE OP)

## 2024-05-10 PROCEDURE — 2500000004 HC RX 250 GENERAL PHARMACY W/ HCPCS (ALT 636 FOR OP/ED)

## 2024-05-10 PROCEDURE — 1100000001 HC PRIVATE ROOM DAILY

## 2024-05-10 PROCEDURE — 51701 INSERT BLADDER CATHETER: CPT

## 2024-05-10 RX ADMIN — ACETAMINOPHEN 650 MG: 325 TABLET ORAL at 17:08

## 2024-05-10 RX ADMIN — ACETAMINOPHEN 650 MG: 325 TABLET ORAL at 23:54

## 2024-05-10 RX ADMIN — GABAPENTIN 300 MG: 300 CAPSULE ORAL at 21:20

## 2024-05-10 RX ADMIN — DOCUSATE SODIUM 100 MG: 100 CAPSULE, LIQUID FILLED ORAL at 21:20

## 2024-05-10 RX ADMIN — METHOCARBAMOL 500 MG: 500 TABLET ORAL at 21:20

## 2024-05-10 RX ADMIN — GABAPENTIN 300 MG: 300 CAPSULE ORAL at 13:07

## 2024-05-10 RX ADMIN — ACETAMINOPHEN 650 MG: 325 TABLET ORAL at 05:59

## 2024-05-10 RX ADMIN — ENOXAPARIN SODIUM 40 MG: 100 INJECTION SUBCUTANEOUS at 08:52

## 2024-05-10 RX ADMIN — METHOCARBAMOL 500 MG: 500 TABLET ORAL at 15:49

## 2024-05-10 RX ADMIN — ENOXAPARIN SODIUM 40 MG: 100 INJECTION SUBCUTANEOUS at 21:20

## 2024-05-10 RX ADMIN — GABAPENTIN 300 MG: 300 CAPSULE ORAL at 05:59

## 2024-05-10 RX ADMIN — ACETAMINOPHEN 650 MG: 325 TABLET ORAL at 13:07

## 2024-05-10 RX ADMIN — DOCUSATE SODIUM 100 MG: 100 CAPSULE, LIQUID FILLED ORAL at 08:52

## 2024-05-10 RX ADMIN — POLYETHYLENE GLYCOL 3350 17 G: 17 POWDER, FOR SOLUTION ORAL at 21:20

## 2024-05-10 RX ADMIN — POLYETHYLENE GLYCOL 3350 17 G: 17 POWDER, FOR SOLUTION ORAL at 08:52

## 2024-05-10 RX ADMIN — METHOCARBAMOL 500 MG: 500 TABLET ORAL at 08:52

## 2024-05-10 ASSESSMENT — PAIN - FUNCTIONAL ASSESSMENT
PAIN_FUNCTIONAL_ASSESSMENT: 0-10

## 2024-05-10 ASSESSMENT — COGNITIVE AND FUNCTIONAL STATUS - GENERAL
HELP NEEDED FOR BATHING: A LOT
PERSONAL GROOMING: A LITTLE
DRESSING REGULAR UPPER BODY CLOTHING: A LOT
CLIMB 3 TO 5 STEPS WITH RAILING: TOTAL
MOVING TO AND FROM BED TO CHAIR: A LITTLE
TOILETING: A LOT
DRESSING REGULAR LOWER BODY CLOTHING: A LOT
MOVING TO AND FROM BED TO CHAIR: A LITTLE
MOBILITY SCORE: 15
STANDING UP FROM CHAIR USING ARMS: A LITTLE
TOILETING: A LOT
DRESSING REGULAR UPPER BODY CLOTHING: A LOT
MOBILITY SCORE: 15
DRESSING REGULAR LOWER BODY CLOTHING: A LOT
CLIMB 3 TO 5 STEPS WITH RAILING: TOTAL
DAILY ACTIVITIY SCORE: 15
HELP NEEDED FOR BATHING: A LOT
PERSONAL GROOMING: A LITTLE
TURNING FROM BACK TO SIDE WHILE IN FLAT BAD: A LITTLE
TURNING FROM BACK TO SIDE WHILE IN FLAT BAD: A LITTLE
STANDING UP FROM CHAIR USING ARMS: A LITTLE
MOVING FROM LYING ON BACK TO SITTING ON SIDE OF FLAT BED WITH BEDRAILS: A LITTLE
MOVING FROM LYING ON BACK TO SITTING ON SIDE OF FLAT BED WITH BEDRAILS: A LITTLE
DAILY ACTIVITIY SCORE: 15
WALKING IN HOSPITAL ROOM: A LOT
WALKING IN HOSPITAL ROOM: A LOT

## 2024-05-10 ASSESSMENT — PAIN SCALES - GENERAL
PAINLEVEL_OUTOF10: 0 - NO PAIN

## 2024-05-10 ASSESSMENT — PAIN SCALES - PAIN ASSESSMENT IN ADVANCED DEMENTIA (PAINAD): TOTALSCORE: MEDICATION (SEE MAR)

## 2024-05-10 NOTE — PROGRESS NOTES
"Orthopaedic Spine Surgery Progress Note    S:  ROMA. Was able to void volitionally last night, good progress. Continuing to work with PT. Denies any CP, SOB, N/V.     O:  /85 (BP Location: Left arm, Patient Position: Lying)   Pulse 107   Temp 36.6 °C (97.9 °F) (Temporal)   Resp 16   Ht 1.956 m (6' 5\")   Wt (!) 214 kg (470 lb 15.9 oz)   SpO2 96%   BMI 55.85 kg/m²     Gen: arousable, NAD, appropriately conversational  Cardiac: RRR to peripheral palpation  Resp: nonlabored on RA  GI: soft, nondistended    MSK:    Spine Exam:  Provena holding suction    L1: SILT       L2: SILT      Hip flexors 5/5 Left; 5/5 Right  L3: SILT      Knee extension 5/5 Left; 5/5 Right  L4: SILT      Tib Ant. (Dorsiflexion) 5/5 Left; 5/5 Right  L5: SILT      EHL 5/5 Left; 5/5 Right  S1: SILT      Plantarflexion 5/5 Left; 5/5 Right    Patellar reflex: 2+   Bilaterally    Babinkski: Intact  No clonus    Labs:  No results found for this or any previous visit (from the past 24 hour(s)).          A/P: 24 y.o. male with severe stenosis at L4-5 from congenital lumbar stenosis and disc herniation s/p L4-5 lami on 5/2 with Dr. Grier. Doing well postop.       Plan:    - Weightbearing status: activity ad trent, highly encourage out of bed and ambulation as soon as possible, prioritize time in chair. Avoid lying on incision. Need to offload incision  - Precautions: no bending, twisting, lifting >10 lbs  - Imaging: none required  - Pain: multimodal regimen including Tylenol, oxycodone, Dilaudid, robaxin  - DVT prophylaxis: SCDs, ambulation; start dvt chemoprophylaxis  - Dressing: Provena replaced 5/7  - Drain: DC'ed  - Godoy: out, hold off on replacing, patient needs to be stood up to urinate as much as possible  - FEN: maintenance IV fluids; HLIV with good PO intake  - Diet: Reg  - Pulm: encourage IS, maintain O2 sat >92%  - Bowel Regimen  - PT/OT consult  - Daily CBC, BMP     Dispo: Medically clear for DC, rec'ed acute rehab, pending P2P for " irish Ng MD  Orthopaedic Surgery, PGY-2  Available by Epic Chat  05/10/24  6:30 AM    This patient will be followed by the Orthopaedic Spine service. Please page or Epic Chat the corresponding residents below with questions or concerns.     Ortho Spine Service ([facility] Chat Preferred)    First call: Rigoberto Ng, PGY2  Second call: Ar Edwards, PGY4      I saw and evaluated the patient.  I personally obtained the key and critical portions of the history and physical exam or was physically present for key and critical portions performed by the Resident. I reviewed the documentation and discussed the patient with the Resident.  I agree with the Resident’s medical decision making as documented in the note.

## 2024-05-10 NOTE — PROGRESS NOTES
P2P phone number is [phone], option 1 then option 2 (this should still be accurate). Reference # on case is [id number]. ANAMIKA updated TCC and MD. ANAMIKA sent updates in SNF referral. Pending accepting SNF. SW will continue to follow.    [id number] was denied. [facility] can accept. SW updated patient. Patient is agreeable to [facility]. SW asked SNF to start precert. ANAMIKA will continue to follow.    CONSTANCE Cordero

## 2024-05-10 NOTE — CARE PLAN
Problem: Pain - Adult  Goal: Verbalizes/displays adequate comfort level or baseline comfort level  Outcome: Progressing     Problem: Safety - Adult  Goal: Free from fall injury  Outcome: Progressing     Problem: Discharge Planning  Goal: Discharge to home or other facility with appropriate resources  Outcome: Progressing     Problem: Chronic Conditions and Co-morbidities  Goal: Patient's chronic conditions and co-morbidity symptoms are monitored and maintained or improved  Outcome: Progressing     Problem: Skin  Goal: Decreased wound size/increased tissue granulation at next dressing change  Outcome: Progressing  Goal: Participates in plan/prevention/treatment measures  Outcome: Progressing  Goal: Prevent/manage excess moisture  Outcome: Progressing  Goal: Prevent/minimize sheer/friction injuries  Outcome: Progressing  Goal: Promote/optimize nutrition  Outcome: Progressing  Goal: Promote skin healing  Outcome: Progressing     Problem: Pain  Goal: Takes deep breaths with improved pain control throughout the shift  Outcome: Progressing  Goal: Turns in bed with improved pain control throughout the shift  Outcome: Progressing  Goal: Walks with improved pain control throughout the shift  Outcome: Progressing  Goal: Performs ADL's with improved pain control throughout shift  Outcome: Progressing  Goal: Participates in PT with improved pain control throughout the shift  Outcome: Progressing  Goal: Free from opioid side effects throughout the shift  Outcome: Progressing  Goal: Free from acute confusion related to pain meds throughout the shift  Outcome: Progressing     Problem: Fall/Injury  Goal: Not fall by end of shift  Outcome: Progressing  Goal: Be free from injury by end of the shift  Outcome: Progressing  Goal: Verbalize understanding of personal risk factors for fall in the hospital  Outcome: Progressing  Goal: Verbalize understanding of risk factor reduction measures to prevent injury from fall in the  home  Outcome: Progressing  Goal: Use assistive devices by end of the shift  Outcome: Progressing  Goal: Pace activities to prevent fatigue by end of the shift  Outcome: Progressing   The patient's goals for the shift include mobility increased    The clinical goals for the shift include Patient will be free from falls and injury    Over the shift, the patient did not make progress toward the following goals. Barriers to progression include weakness do to recent surgery. Recommendations to address these barriers include call light and personal items within reach.

## 2024-05-10 NOTE — PROGRESS NOTES
Patient is medically cleared for discharge recommended for high intensity therapy at discharge, denied admission to [facility] by insurance P2P offered by calling [phone] option #1 then option #2, reference # [id number] for case. Denial is upheld on P2P, now precert is started with [facility]. Will continue to follow with a safe discharge plan.

## 2024-05-11 VITALS
SYSTOLIC BLOOD PRESSURE: 123 MMHG | HEIGHT: 77 IN | DIASTOLIC BLOOD PRESSURE: 73 MMHG | WEIGHT: 315 LBS | RESPIRATION RATE: 16 BRPM | OXYGEN SATURATION: 96 % | TEMPERATURE: 97.9 F | HEART RATE: 63 BPM | BODY MASS INDEX: 37.19 KG/M2

## 2024-05-11 PROCEDURE — 2500000001 HC RX 250 WO HCPCS SELF ADMINISTERED DRUGS (ALT 637 FOR MEDICARE OP)

## 2024-05-11 PROCEDURE — 2500000004 HC RX 250 GENERAL PHARMACY W/ HCPCS (ALT 636 FOR OP/ED)

## 2024-05-11 RX ADMIN — ACETAMINOPHEN 650 MG: 325 TABLET ORAL at 05:41

## 2024-05-11 RX ADMIN — ENOXAPARIN SODIUM 40 MG: 100 INJECTION SUBCUTANEOUS at 09:53

## 2024-05-11 RX ADMIN — GABAPENTIN 300 MG: 300 CAPSULE ORAL at 05:42

## 2024-05-11 RX ADMIN — METHOCARBAMOL 500 MG: 500 TABLET ORAL at 09:53

## 2024-05-11 ASSESSMENT — PAIN SCALES - GENERAL
PAINLEVEL_OUTOF10: 0 - NO PAIN
PAINLEVEL_OUTOF10: 0 - NO PAIN

## 2024-05-11 ASSESSMENT — PAIN - FUNCTIONAL ASSESSMENT
PAIN_FUNCTIONAL_ASSESSMENT: 0-10
PAIN_FUNCTIONAL_ASSESSMENT: 0-10

## 2024-05-11 NOTE — PROGRESS NOTES
"Orthopaedic Spine Surgery Progress Note    S:  ROMA apart from straight cath. Was able to void volitionally yesterday, issues w/ positional urination. Continuing to work with PT. Denies any CP, SOB, N/V.     O:  /60   Pulse 106   Temp 37.1 °C (98.8 °F) (Tympanic)   Resp 16   Ht 1.956 m (6' 5\")   Wt (!) 214 kg (470 lb 15.9 oz)   SpO2 96% Comment: room air  BMI 55.85 kg/m²     Gen: arousable, NAD, appropriately conversational  Cardiac: RRR to peripheral palpation  Resp: nonlabored on RA  GI: soft, nondistended    MSK:    Spine Exam:  Provena holding suction    L1: SILT       L2: SILT      Hip flexors 5/5 Left; 5/5 Right  L3: SILT      Knee extension 5/5 Left; 5/5 Right  L4: SILT      Tib Ant. (Dorsiflexion) 5/5 Left; 5/5 Right  L5: SILT      EHL 5/5 Left; 5/5 Right  S1: SILT      Plantarflexion 5/5 Left; 5/5 Right    Patellar reflex: 2+   Bilaterally    Babinkski: Intact  No clonus    Labs:  No results found for this or any previous visit (from the past 24 hour(s)).          A/P: 24 y.o. male with severe stenosis at L4-5 from congenital lumbar stenosis and disc herniation s/p L4-5 lami on 5/2 with Dr. Grier. Doing well postop.       Plan:    - Weightbearing status: activity ad trent, highly encourage out of bed and ambulation as soon as possible, prioritize time in chair. Avoid lying on incision. Need to offload incision  - Precautions: no bending, twisting, lifting >10 lbs  - Imaging: none required  - Pain: multimodal regimen including Tylenol, oxycodone, Dilaudid, robaxin  - DVT prophylaxis: SCDs, ambulation; start dvt chemoprophylaxis  - Dressing: Provena replaced 5/7  - Drain: DC'ed  - Godoy: out, hold off on replacing, patient needs to be stood up to urinate as much as possible  - FEN: maintenance IV fluids; HLIV with good PO intake  - Diet: Reg  - Pulm: encourage IS, maintain O2 sat >92%  - Bowel Regimen  - PT/OT consult  - Daily CBC, BMP     Dispo: Medically clear for DC, denied AR, pending SNF " joana Ng MD  Orthopaedic Surgery, PGY-2  Available by Epic Chat  05/11/24  6:09 AM    This patient will be followed by the Orthopaedic Spine service. Please page or Epic Chat the corresponding residents below with questions or concerns.     Ortho Spine Service ([facility] Chat Preferred)    First call: Rigoberto Ng, PGY2  Second call: Ar Edwards, PGY4      I saw and evaluated the patient.  I personally obtained the key and critical portions of the history and physical exam or was physically present for key and critical portions performed by the Resident. I reviewed the documentation and discussed the patient with the Resident.  I agree with the Resident’s medical decision making as documented in the note.    Waiting for SNF percert

## 2024-05-11 NOTE — PROGRESS NOTES
Patient to discharge today at 415 being transported by community care ambulance going to [facility]  patient nurse  and facility notified

## 2024-05-12 NOTE — DISCHARGE SUMMARY
Orthopaedic Surgery Discharge Summary  Date of discharge: 5/11/24  Discharge Diagnosis  Acute midline low back pain, unspecified whether sciatica present    Issues Requiring Follow-Up  Routine Postoperative Followup    Test Results Pending At Discharge  Pending Labs       No current pending labs.            Hospital Course  24 year-old male who presented with impending cauda equina syndrome. MRI with congenital lumbar stenosis and L4-5 disc herniation with severe canal stenosis. Patient is now s/p L4-5 laminectomy decompression on 5/2 with Dr. Grier. On the day of surgery, patient was identified in the pre-operative holding area and agreeable to proceed with surgery.  Prior to surgery, patient was endorsing General numbness concern for impending cauda equina syndrome.  Written consent was obtained prior to surgery.  Please see operative note for further details of this procedure. Patient received 24 hours of richard-operative antibiotics. Patient recovered in the PACU before transfer to a regular nursing floor. Patient was started on oxycodone and tylenol for pain control.  His drain was ultimately discontinued when the output slowed to an appropriate level.  Patient has issues with volitional voiding and urinary retention managed with a Godoy catheter that ultimately resolved.  He was also placed on a clear liquid diet during his hospitalization for 2-day period of a postoperative ileus that resolved without complication.  Physical therapy recommended continued recovery at acute rehab facility with continued physical therapy.  However, endurance denied approval of acute rehab and his appeal was unsuccessful, and as such he was discharged to a skilled nursing facility for continued physical therapy.  On the day of discharge, patient was afebrile with stable vital signs. Patient was neurovascularly intact at time of discharge, please see prior documented spine exam. Patient will follow-up with Dr. Grier in 2 weeks for  post-operative visit.     Home Medications     Medication List      START taking these medications     docusate sodium 100 mg capsule; Commonly known as: Colace; Take 1   capsule (100 mg) by mouth 2 times a day.   enoxaparin 40 mg/0.4 mL syringe; Commonly known as: Lovenox; Inject 0.4   mL (40 mg) under the skin 2 times a day.   methocarbamol 500 mg tablet; Commonly known as: Robaxin; Take 1 tablet   (500 mg) by mouth 3 times a day as needed for muscle spasms for up to 14   days.   oxyCODONE 5 mg immediate release tablet; Commonly known as: Roxicodone;   Take 1 tablet (5 mg) by mouth every 6 hours if needed for severe pain (7 -   10) for up to 7 days.   polyethylene glycol 17 gram packet; Commonly known as: Glycolax,   Miralax; Take 17 g by mouth every 12 hours.     CONTINUE taking these medications     Trulicity 0.75 mg/0.5 mL pen injector; Generic drug: dulaglutide       Outpatient Follow-Up  Future Appointments   Date Time Provider Department Center   5/23/2024  1:00 PM Marina Grier MD [id number]       Rigoberto Ng MD  Orthopaedic Surgery, PGY-2  Available by Epic Chat  05/12/24  7:36 PM

## 2024-05-23 ENCOUNTER — OFFICE VISIT (OUTPATIENT)
Dept: ORTHOPEDIC SURGERY | Facility: HOSPITAL | Age: 24
End: 2024-05-23
Payer: COMMERCIAL

## 2024-05-23 DIAGNOSIS — Z98.890 S/P LUMBAR LAMINECTOMY: ICD-10-CM

## 2024-05-23 DIAGNOSIS — M48.061 SPINAL STENOSIS OF LUMBAR REGION WITH RADICULOPATHY: Primary | ICD-10-CM

## 2024-05-23 DIAGNOSIS — M54.16 SPINAL STENOSIS OF LUMBAR REGION WITH RADICULOPATHY: Primary | ICD-10-CM

## 2024-05-23 PROCEDURE — 1036F TOBACCO NON-USER: CPT | Performed by: ORTHOPAEDIC SURGERY

## 2024-05-23 PROCEDURE — 99024 POSTOP FOLLOW-UP VISIT: CPT | Performed by: ORTHOPAEDIC SURGERY

## 2024-05-23 NOTE — PROGRESS NOTES
S: Erwin Cline is a 24 y.o. year old male patient who is 3 weeks out status post L4-L5 laminectomy decompression for stenosis radicular leg pain and saddle anesthesia on May 2, 2024.  Past medical history includes severe morbid obesity.  According to his parents he also has a lot of mental health issues including Aspergers, PPNOS, ADHD and OCD.  He was ultimately discharged to a skilled nursing facility.  At baseline he is pretty sedentary and required multiple people to assist with getting in and out of bed and ambulating.  He is here for his first postop visit.  He was discharged from the hospital with a 14-day wound VAC however there was conflicting stories in terms of what happened.  Patient states that the cord for the wound VAC was not connected to the machine and I was told from the staff at the nursing facility that the patient pulled out the cord.  Either way the wound VAC has been off for a week.  Patient otherwise denies any problems with his incision.  He otherwise denies any radicular leg pain he still have some numbness.    When he left our hospital he did not have a Godoy he does have a Godoy now he states that he is unable to get to the bathroom in time.  If he stands up he can urinate he feels the urge to go however does not seem to have make it to the bathroom in time.  Same thing with bowel movements.  At rehab he is only getting 1 hour of therapy a day the rest of the day he is either sitting in bed or in a chair.  He is here with his parents today.  Plan is to move into their house after he gets out of rehab.  Parents are concerned that his mental health issues is interfering with his recovery and is contributing to his sedentary lifestyle.      O:   General: Patient appears well-nourished and well-developed in no acute distress, Alert and Oriented x3.  Morbidly obese male  Psych: Pleasant mood and affect  HEENT: Extraocular muscles intact, pupils equal and round. Sclerae anicteric    Cardio: extremities warm and well perfused  Resp: unlabored symmetric breathing  Skin: His posterior lumbar incision is clean dry intact.  Sutures removed in clinic today.  Musculoskeletal/Neuro Exam: Sitting in a wheelchair.      Lower extremity    Motor: Right leg with 5 out of 5 motor strength with hip flexion, knee extension, ankle dorsiflexion plantarflexion EHL against resistance  Left leg with 5 out of 5 motor strength with hip flexion, knee extension, ankle dorsiflexion plantarflexion EHL against resistance    Sensation to light touch intact along L2-S1 bilateraly some numbness in his medial thigh                  A/P: Erwin Cline is a 24 y.o. year old male patient who is 3 weeks out status post L4-L5 laminectomy decompression on May 2, 2024.  Encouraged him to continue working with physical therapy and try to ambulate is much as he can.  I also gave him a letter for his rehab facility to consider putting a psych consult to help manage his mental health issues per patient's and patient's parents request.  His incision is healing nicely and sutures were removed today in clinic.  He may need outpatient urology consult for his bladder.  He will follow-up in 6 weeks.    After our discussion, the patient articulated understanding of the plan and felt that all questions had been answered satisfactorily. The patient was pleased with the visit and very appreciative for the care rendered.    **Please excuse any errors in grammar or translation related to this dictation. Voice recognition software was utilized to prepare this document. **                  Marina Grier MD    Department of Orthopaedic Surgery  [facility]  [email or web address]

## 2024-05-23 NOTE — LETTER
Mr. Erwin Harrell was seen in clinic today for his 3-week postop visit status post L4-5 laminectomy decompression.  His incision has healed nicely sutures removed in clinic today.  Recommend continue physical therapy encourage ambulation.  Encourage patient to try to walk to the bathroom.  There is also concerned about his mental health.  Patient has history of Aspergers and PPNOS which may be affecting his mental health and recovery.  Recommend psych consult for evaluation and management.  Patient and his parents were in agreement to this.  Once he is discharged from skilled nursing facility he would benefit from continued therapy at home.  He will follow-up in 6 weeks for reassessment.  A follow-up appointment was scheduled in clinic today.    Marina Grier MD    Department of Orthopaedic Surgery  [facility]  [email or web address]

## 2024-06-04 ENCOUNTER — HOSPITAL ENCOUNTER (INPATIENT)
Facility: HOSPITAL | Age: 24
LOS: 7 days | Discharge: HOME | End: 2024-06-11
Attending: STUDENT IN AN ORGANIZED HEALTH CARE EDUCATION/TRAINING PROGRAM | Admitting: INTERNAL MEDICINE
Payer: COMMERCIAL

## 2024-06-04 ENCOUNTER — APPOINTMENT (OUTPATIENT)
Dept: RADIOLOGY | Facility: HOSPITAL | Age: 24
DRG: 871 | End: 2024-06-04
Payer: COMMERCIAL

## 2024-06-04 ENCOUNTER — APPOINTMENT (OUTPATIENT)
Dept: CARDIOLOGY | Facility: HOSPITAL | Age: 24
DRG: 871 | End: 2024-06-04
Payer: COMMERCIAL

## 2024-06-04 DIAGNOSIS — R78.81 E COLI BACTEREMIA: ICD-10-CM

## 2024-06-04 DIAGNOSIS — N12 PYELONEPHRITIS: ICD-10-CM

## 2024-06-04 DIAGNOSIS — R65.20 SEVERE SEPSIS (MULTI): Primary | ICD-10-CM

## 2024-06-04 DIAGNOSIS — B96.20 E COLI BACTEREMIA: ICD-10-CM

## 2024-06-04 DIAGNOSIS — A41.9 SEVERE SEPSIS (MULTI): Primary | ICD-10-CM

## 2024-06-04 DIAGNOSIS — R31.9 HEMATURIA, UNSPECIFIED TYPE: ICD-10-CM

## 2024-06-04 PROBLEM — R65.10 SIRS (SYSTEMIC INFLAMMATORY RESPONSE SYNDROME) (MULTI): Status: ACTIVE | Noted: 2024-06-04

## 2024-06-04 PROBLEM — R31.0 GROSS HEMATURIA: Status: ACTIVE | Noted: 2024-06-04

## 2024-06-04 PROBLEM — E66.01 MORBID OBESITY (MULTI): Status: ACTIVE | Noted: 2024-05-02

## 2024-06-04 PROBLEM — E66.9 OBESITY: Status: RESOLVED | Noted: 2024-05-02 | Resolved: 2024-06-04

## 2024-06-04 LAB
ALBUMIN SERPL-MCNC: 4.3 G/DL (ref 3.5–5)
ALP BLD-CCNC: 76 U/L (ref 35–125)
ALT SERPL-CCNC: 194 U/L (ref 5–40)
ANION GAP SERPL CALC-SCNC: 13 MMOL/L
APPEARANCE UR: ABNORMAL
AST SERPL-CCNC: 61 U/L (ref 5–40)
BACTERIA #/AREA URNS AUTO: ABNORMAL /HPF
BASOPHILS # BLD MANUAL: 0 X10*3/UL (ref 0–0.1)
BASOPHILS NFR BLD MANUAL: 0 %
BILIRUB SERPL-MCNC: 0.8 MG/DL (ref 0.1–1.2)
BILIRUB UR STRIP.AUTO-MCNC: NEGATIVE MG/DL
BUN SERPL-MCNC: 17 MG/DL (ref 8–25)
CALCIUM SERPL-MCNC: 9.7 MG/DL (ref 8.5–10.4)
CHLORIDE SERPL-SCNC: 106 MMOL/L (ref 97–107)
CO2 SERPL-SCNC: 25 MMOL/L (ref 24–31)
COLOR UR: ABNORMAL
CREAT SERPL-MCNC: 0.7 MG/DL (ref 0.4–1.6)
EGFRCR SERPLBLD CKD-EPI 2021: >90 ML/MIN/1.73M*2
EOSINOPHIL # BLD MANUAL: 0.28 X10*3/UL (ref 0–0.7)
EOSINOPHIL NFR BLD MANUAL: 6 %
ERYTHROCYTE [DISTWIDTH] IN BLOOD BY AUTOMATED COUNT: 14.1 % (ref 11.5–14.5)
ERYTHROCYTE [DISTWIDTH] IN BLOOD BY AUTOMATED COUNT: 14.6 % (ref 11.5–14.5)
GLUCOSE SERPL-MCNC: 98 MG/DL (ref 65–99)
GLUCOSE UR STRIP.AUTO-MCNC: NORMAL MG/DL
HCT VFR BLD AUTO: 34 % (ref 41–52)
HCT VFR BLD AUTO: 44.4 % (ref 41–52)
HGB BLD-MCNC: 11.1 G/DL (ref 13.5–17.5)
HGB BLD-MCNC: 14.2 G/DL (ref 13.5–17.5)
IMM GRANULOCYTES # BLD AUTO: 0.01 X10*3/UL (ref 0–0.7)
IMM GRANULOCYTES NFR BLD AUTO: 0.2 % (ref 0–0.9)
KETONES UR STRIP.AUTO-MCNC: NEGATIVE MG/DL
LACTATE BLDV-SCNC: 3.6 MMOL/L (ref 0.4–2)
LACTATE BLDV-SCNC: 4.5 MMOL/L (ref 0.4–2)
LACTATE BLDV-SCNC: 6.3 MMOL/L (ref 0.4–2)
LEUKOCYTE ESTERASE UR QL STRIP.AUTO: ABNORMAL
LYMPHOCYTES # BLD MANUAL: 1.22 X10*3/UL (ref 1.2–4.8)
LYMPHOCYTES NFR BLD MANUAL: 26 %
MAGNESIUM SERPL-MCNC: 2 MG/DL (ref 1.6–3.1)
MCH RBC QN AUTO: 27.5 PG (ref 26–34)
MCH RBC QN AUTO: 27.9 PG (ref 26–34)
MCHC RBC AUTO-ENTMCNC: 32 G/DL (ref 32–36)
MCHC RBC AUTO-ENTMCNC: 32.6 G/DL (ref 32–36)
MCV RBC AUTO: 85 FL (ref 80–100)
MCV RBC AUTO: 86 FL (ref 80–100)
MONOCYTES # BLD MANUAL: 0.14 X10*3/UL (ref 0.1–1)
MONOCYTES NFR BLD MANUAL: 3 %
MUCOUS THREADS #/AREA URNS AUTO: ABNORMAL /LPF
NEUTROPHILS # BLD MANUAL: 2.91 X10*3/UL (ref 1.2–7.7)
NEUTS BAND # BLD MANUAL: 0.61 X10*3/UL (ref 0–0.7)
NEUTS BAND NFR BLD MANUAL: 13 %
NEUTS SEG # BLD MANUAL: 2.3 X10*3/UL (ref 1.2–7)
NEUTS SEG NFR BLD MANUAL: 49 %
NITRITE UR QL STRIP.AUTO: NEGATIVE
NRBC BLD-RTO: 0 /100 WBCS (ref 0–0)
NRBC BLD-RTO: 0 /100 WBCS (ref 0–0)
PH UR STRIP.AUTO: 6 [PH]
PLATELET # BLD AUTO: 239 X10*3/UL (ref 150–450)
PLATELET # BLD AUTO: 375 X10*3/UL (ref 150–450)
POTASSIUM SERPL-SCNC: 3.9 MMOL/L (ref 3.4–5.1)
PROT SERPL-MCNC: 8 G/DL (ref 5.9–7.9)
PROT UR STRIP.AUTO-MCNC: ABNORMAL MG/DL
RBC # BLD AUTO: 3.98 X10*6/UL (ref 4.5–5.9)
RBC # BLD AUTO: 5.17 X10*6/UL (ref 4.5–5.9)
RBC # UR STRIP.AUTO: ABNORMAL /UL
RBC #/AREA URNS AUTO: >20 /HPF
RBC MORPH BLD: NORMAL
SODIUM SERPL-SCNC: 144 MMOL/L (ref 133–145)
SP GR UR STRIP.AUTO: 1.02
SQUAMOUS #/AREA URNS AUTO: ABNORMAL /HPF
TOTAL CELLS COUNTED BLD: 100
UROBILINOGEN UR STRIP.AUTO-MCNC: NORMAL MG/DL
VARIANT LYMPHS # BLD MANUAL: 0.14 X10*3/UL (ref 0–0.5)
VARIANT LYMPHS NFR BLD: 3 %
WBC # BLD AUTO: 21.2 X10*3/UL (ref 4.4–11.3)
WBC # BLD AUTO: 4.7 X10*3/UL (ref 4.4–11.3)
WBC #/AREA URNS AUTO: >50 /HPF
WBC CLUMPS #/AREA URNS AUTO: ABNORMAL /HPF

## 2024-06-04 PROCEDURE — 85027 COMPLETE CBC AUTOMATED: CPT | Performed by: INTERNAL MEDICINE

## 2024-06-04 PROCEDURE — 81001 URINALYSIS AUTO W/SCOPE: CPT | Performed by: STUDENT IN AN ORGANIZED HEALTH CARE EDUCATION/TRAINING PROGRAM

## 2024-06-04 PROCEDURE — 80053 COMPREHEN METABOLIC PANEL: CPT | Performed by: STUDENT IN AN ORGANIZED HEALTH CARE EDUCATION/TRAINING PROGRAM

## 2024-06-04 PROCEDURE — 74177 CT ABD & PELVIS W/CONTRAST: CPT

## 2024-06-04 PROCEDURE — 93010 ELECTROCARDIOGRAM REPORT: CPT | Performed by: INTERNAL MEDICINE

## 2024-06-04 PROCEDURE — 2500000004 HC RX 250 GENERAL PHARMACY W/ HCPCS (ALT 636 FOR OP/ED): Performed by: NURSE PRACTITIONER

## 2024-06-04 PROCEDURE — 93005 ELECTROCARDIOGRAM TRACING: CPT

## 2024-06-04 PROCEDURE — 83605 ASSAY OF LACTIC ACID: CPT | Performed by: PHYSICIAN ASSISTANT

## 2024-06-04 PROCEDURE — 74177 CT ABD & PELVIS W/CONTRAST: CPT | Performed by: RADIOLOGY

## 2024-06-04 PROCEDURE — 36415 COLL VENOUS BLD VENIPUNCTURE: CPT | Performed by: PHYSICIAN ASSISTANT

## 2024-06-04 PROCEDURE — 96360 HYDRATION IV INFUSION INIT: CPT

## 2024-06-04 PROCEDURE — 2500000004 HC RX 250 GENERAL PHARMACY W/ HCPCS (ALT 636 FOR OP/ED): Performed by: PHYSICIAN ASSISTANT

## 2024-06-04 PROCEDURE — 81001 URINALYSIS AUTO W/SCOPE: CPT | Performed by: PHYSICIAN ASSISTANT

## 2024-06-04 PROCEDURE — 80053 COMPREHEN METABOLIC PANEL: CPT | Performed by: PHYSICIAN ASSISTANT

## 2024-06-04 PROCEDURE — 85027 COMPLETE CBC AUTOMATED: CPT | Performed by: PHYSICIAN ASSISTANT

## 2024-06-04 PROCEDURE — 99285 EMERGENCY DEPT VISIT HI MDM: CPT | Mod: 25

## 2024-06-04 PROCEDURE — 85007 BL SMEAR W/DIFF WBC COUNT: CPT | Performed by: STUDENT IN AN ORGANIZED HEALTH CARE EDUCATION/TRAINING PROGRAM

## 2024-06-04 PROCEDURE — 82550 ASSAY OF CK (CPK): CPT | Performed by: INTERNAL MEDICINE

## 2024-06-04 PROCEDURE — 96365 THER/PROPH/DIAG IV INF INIT: CPT

## 2024-06-04 PROCEDURE — 96375 TX/PRO/DX INJ NEW DRUG ADDON: CPT

## 2024-06-04 PROCEDURE — 85007 BL SMEAR W/DIFF WBC COUNT: CPT | Performed by: PHYSICIAN ASSISTANT

## 2024-06-04 PROCEDURE — 85027 COMPLETE CBC AUTOMATED: CPT | Performed by: STUDENT IN AN ORGANIZED HEALTH CARE EDUCATION/TRAINING PROGRAM

## 2024-06-04 PROCEDURE — 83605 ASSAY OF LACTIC ACID: CPT | Performed by: INTERNAL MEDICINE

## 2024-06-04 PROCEDURE — 83880 ASSAY OF NATRIURETIC PEPTIDE: CPT | Performed by: INTERNAL MEDICINE

## 2024-06-04 PROCEDURE — 2500000004 HC RX 250 GENERAL PHARMACY W/ HCPCS (ALT 636 FOR OP/ED): Mod: JZ | Performed by: INTERNAL MEDICINE

## 2024-06-04 PROCEDURE — 2020000001 HC ICU ROOM DAILY

## 2024-06-04 PROCEDURE — 83735 ASSAY OF MAGNESIUM: CPT | Performed by: INTERNAL MEDICINE

## 2024-06-04 PROCEDURE — 2550000001 HC RX 255 CONTRASTS: Performed by: STUDENT IN AN ORGANIZED HEALTH CARE EDUCATION/TRAINING PROGRAM

## 2024-06-04 PROCEDURE — 87040 BLOOD CULTURE FOR BACTERIA: CPT | Mod: TRILAB | Performed by: PHYSICIAN ASSISTANT

## 2024-06-04 PROCEDURE — 84075 ASSAY ALKALINE PHOSPHATASE: CPT | Performed by: INTERNAL MEDICINE

## 2024-06-04 PROCEDURE — 84484 ASSAY OF TROPONIN QUANT: CPT | Performed by: INTERNAL MEDICINE

## 2024-06-04 RX ORDER — ONDANSETRON HYDROCHLORIDE 2 MG/ML
4 INJECTION, SOLUTION INTRAVENOUS ONCE
Status: COMPLETED | OUTPATIENT
Start: 2024-06-04 | End: 2024-06-04

## 2024-06-04 RX ORDER — VANCOMYCIN 1.5 G/300ML
1500 INJECTION, SOLUTION INTRAVENOUS ONCE
Status: COMPLETED | OUTPATIENT
Start: 2024-06-04 | End: 2024-06-04

## 2024-06-04 RX ORDER — ACETAMINOPHEN 325 MG/1
975 TABLET ORAL ONCE
Status: DISCONTINUED | OUTPATIENT
Start: 2024-06-04 | End: 2024-06-11 | Stop reason: HOSPADM

## 2024-06-04 RX ORDER — CEFTRIAXONE 2 G/50ML
2 INJECTION, SOLUTION INTRAVENOUS EVERY 24 HOURS
Status: DISCONTINUED | OUTPATIENT
Start: 2024-06-05 | End: 2024-06-05

## 2024-06-04 RX ORDER — CEFTRIAXONE 1 G/50ML
1 INJECTION, SOLUTION INTRAVENOUS EVERY 24 HOURS
Status: DISCONTINUED | OUTPATIENT
Start: 2024-06-04 | End: 2024-06-04

## 2024-06-04 RX ORDER — ENOXAPARIN SODIUM 100 MG/ML
40 INJECTION SUBCUTANEOUS DAILY
Status: DISCONTINUED | OUTPATIENT
Start: 2024-06-04 | End: 2024-06-11 | Stop reason: HOSPADM

## 2024-06-04 RX ORDER — KETOROLAC TROMETHAMINE 30 MG/ML
15 INJECTION, SOLUTION INTRAMUSCULAR; INTRAVENOUS ONCE
Status: COMPLETED | OUTPATIENT
Start: 2024-06-04 | End: 2024-06-04

## 2024-06-04 RX ORDER — METHOCARBAMOL 500 MG/1
500 TABLET, FILM COATED ORAL 3 TIMES DAILY PRN
Status: DISCONTINUED | OUTPATIENT
Start: 2024-06-04 | End: 2024-06-11 | Stop reason: HOSPADM

## 2024-06-04 RX ORDER — DOCUSATE SODIUM 100 MG/1
100 CAPSULE, LIQUID FILLED ORAL 2 TIMES DAILY
Status: DISCONTINUED | OUTPATIENT
Start: 2024-06-04 | End: 2024-06-11 | Stop reason: HOSPADM

## 2024-06-04 RX ORDER — ACETAMINOPHEN 325 MG/1
975 TABLET ORAL EVERY 8 HOURS PRN
Status: DISCONTINUED | OUTPATIENT
Start: 2024-06-04 | End: 2024-06-05

## 2024-06-04 RX ORDER — ENOXAPARIN SODIUM 100 MG/ML
40 INJECTION SUBCUTANEOUS 2 TIMES DAILY
Status: DISCONTINUED | OUTPATIENT
Start: 2024-06-04 | End: 2024-06-04

## 2024-06-04 RX ORDER — POLYETHYLENE GLYCOL 3350 17 G/17G
17 POWDER, FOR SOLUTION ORAL EVERY 12 HOURS
Status: DISCONTINUED | OUTPATIENT
Start: 2024-06-04 | End: 2024-06-04

## 2024-06-04 RX ORDER — MEROPENEM AND SODIUM CHLORIDE 1 G/50ML
1 INJECTION, SOLUTION INTRAVENOUS EVERY 8 HOURS
Status: DISCONTINUED | OUTPATIENT
Start: 2024-06-04 | End: 2024-06-05

## 2024-06-04 RX ORDER — VANCOMYCIN 2 G/400ML
2 INJECTION, SOLUTION INTRAVENOUS EVERY 8 HOURS
Status: DISCONTINUED | OUTPATIENT
Start: 2024-06-05 | End: 2024-06-05

## 2024-06-04 RX ORDER — VANCOMYCIN HYDROCHLORIDE 1 G/20ML
INJECTION, POWDER, LYOPHILIZED, FOR SOLUTION INTRAVENOUS DAILY PRN
Status: DISCONTINUED | OUTPATIENT
Start: 2024-06-04 | End: 2024-06-05

## 2024-06-04 RX ORDER — CEFTRIAXONE 1 G/50ML
1 INJECTION, SOLUTION INTRAVENOUS ONCE
Status: COMPLETED | OUTPATIENT
Start: 2024-06-04 | End: 2024-06-04

## 2024-06-04 RX ORDER — POLYETHYLENE GLYCOL 3350 17 G/17G
17 POWDER, FOR SOLUTION ORAL DAILY PRN
Status: DISCONTINUED | OUTPATIENT
Start: 2024-06-04 | End: 2024-06-11 | Stop reason: HOSPADM

## 2024-06-04 RX ADMIN — ONDANSETRON HYDROCHLORIDE 4 MG: 2 INJECTION INTRAMUSCULAR; INTRAVENOUS at 13:55

## 2024-06-04 RX ADMIN — CEFTRIAXONE SODIUM 1 G: 1 INJECTION, SOLUTION INTRAVENOUS at 19:34

## 2024-06-04 RX ADMIN — MEROPENEM AND SODIUM CHLORIDE 1 G: 1 INJECTION, SOLUTION INTRAVENOUS at 23:09

## 2024-06-04 RX ADMIN — SODIUM CHLORIDE 1000 ML: 900 INJECTION, SOLUTION INTRAVENOUS at 22:38

## 2024-06-04 RX ADMIN — TOBRAMYCIN 700 MG: 40 INJECTION INTRAMUSCULAR; INTRAVENOUS at 17:12

## 2024-06-04 RX ADMIN — SODIUM CHLORIDE 1000 ML: 9 INJECTION, SOLUTION INTRAVENOUS at 14:55

## 2024-06-04 RX ADMIN — VANCOMYCIN 1.5 G: 1.5 INJECTION, SOLUTION INTRAVENOUS at 18:01

## 2024-06-04 RX ADMIN — ACETAMINOPHEN 975 MG: 325 TABLET ORAL at 22:14

## 2024-06-04 RX ADMIN — SODIUM CHLORIDE 1000 ML: 9 INJECTION, SOLUTION INTRAVENOUS at 13:55

## 2024-06-04 RX ADMIN — SODIUM CHLORIDE, SODIUM LACTATE, POTASSIUM CHLORIDE, AND CALCIUM CHLORIDE 1000 ML: 600; 310; 30; 20 INJECTION, SOLUTION INTRAVENOUS at 17:26

## 2024-06-04 RX ADMIN — CEFTRIAXONE SODIUM 1 G: 1 INJECTION, SOLUTION INTRAVENOUS at 13:50

## 2024-06-04 RX ADMIN — KETOROLAC TROMETHAMINE 15 MG: 30 INJECTION, SOLUTION INTRAMUSCULAR at 13:55

## 2024-06-04 RX ADMIN — IOHEXOL 75 ML: 350 INJECTION, SOLUTION INTRAVENOUS at 14:46

## 2024-06-04 SDOH — SOCIAL STABILITY: SOCIAL INSECURITY: DOES ANYONE TRY TO KEEP YOU FROM HAVING/CONTACTING OTHER FRIENDS OR DOING THINGS OUTSIDE YOUR HOME?: NO

## 2024-06-04 SDOH — SOCIAL STABILITY: SOCIAL INSECURITY: HAVE YOU HAD THOUGHTS OF HARMING ANYONE ELSE?: NO

## 2024-06-04 SDOH — SOCIAL STABILITY: SOCIAL INSECURITY: HAS ANYONE EVER THREATENED TO HURT YOUR FAMILY OR YOUR PETS?: NO

## 2024-06-04 SDOH — ECONOMIC STABILITY: HOUSING INSECURITY: IN THE LAST 12 MONTHS, HOW MANY PLACES HAVE YOU LIVED?: 2

## 2024-06-04 SDOH — SOCIAL STABILITY: SOCIAL INSECURITY: ABUSE: ADULT

## 2024-06-04 SDOH — SOCIAL STABILITY: SOCIAL INSECURITY: DO YOU FEEL UNSAFE GOING BACK TO THE PLACE WHERE YOU ARE LIVING?: NO

## 2024-06-04 SDOH — ECONOMIC STABILITY: INCOME INSECURITY: IN THE LAST 12 MONTHS, WAS THERE A TIME WHEN YOU WERE NOT ABLE TO PAY THE MORTGAGE OR RENT ON TIME?: NO

## 2024-06-04 SDOH — SOCIAL STABILITY: SOCIAL INSECURITY: DO YOU FEEL ANYONE HAS EXPLOITED OR TAKEN ADVANTAGE OF YOU FINANCIALLY OR OF YOUR PERSONAL PROPERTY?: NO

## 2024-06-04 SDOH — SOCIAL STABILITY: SOCIAL INSECURITY: ARE YOU OR HAVE YOU BEEN THREATENED OR ABUSED PHYSICALLY, EMOTIONALLY, OR SEXUALLY BY ANYONE?: NO

## 2024-06-04 SDOH — ECONOMIC STABILITY: TRANSPORTATION INSECURITY
IN THE PAST 12 MONTHS, HAS THE LACK OF TRANSPORTATION KEPT YOU FROM MEDICAL APPOINTMENTS OR FROM GETTING MEDICATIONS?: NO

## 2024-06-04 SDOH — SOCIAL STABILITY: SOCIAL INSECURITY: WERE YOU ABLE TO COMPLETE ALL THE BEHAVIORAL HEALTH SCREENINGS?: YES

## 2024-06-04 SDOH — SOCIAL STABILITY: SOCIAL INSECURITY: ARE THERE ANY APPARENT SIGNS OF INJURIES/BEHAVIORS THAT COULD BE RELATED TO ABUSE/NEGLECT?: NO

## 2024-06-04 SDOH — ECONOMIC STABILITY: TRANSPORTATION INSECURITY
IN THE PAST 12 MONTHS, HAS LACK OF TRANSPORTATION KEPT YOU FROM MEETINGS, WORK, OR FROM GETTING THINGS NEEDED FOR DAILY LIVING?: NO

## 2024-06-04 SDOH — SOCIAL STABILITY: SOCIAL INSECURITY: HAVE YOU HAD ANY THOUGHTS OF HARMING ANYONE ELSE?: NO

## 2024-06-04 ASSESSMENT — PAIN SCALES - GENERAL
PAINLEVEL_OUTOF10: 2
PAINLEVEL_OUTOF10: 0 - NO PAIN
PAINLEVEL_OUTOF10: 0 - NO PAIN

## 2024-06-04 ASSESSMENT — LIFESTYLE VARIABLES
HOW OFTEN DO YOU HAVE 6 OR MORE DRINKS ON ONE OCCASION: NEVER
SKIP TO QUESTIONS 9-10: 1
AUDIT-C TOTAL SCORE: 0
AUDIT-C TOTAL SCORE: 0
HOW MANY STANDARD DRINKS CONTAINING ALCOHOL DO YOU HAVE ON A TYPICAL DAY: PATIENT DOES NOT DRINK
HOW OFTEN DO YOU HAVE A DRINK CONTAINING ALCOHOL: NEVER
PRESCIPTION_ABUSE_PAST_12_MONTHS: NO
SUBSTANCE_ABUSE_PAST_12_MONTHS: NO

## 2024-06-04 ASSESSMENT — ENCOUNTER SYMPTOMS
HEMATOLOGIC/LYMPHATIC NEGATIVE: 1
CARDIOVASCULAR NEGATIVE: 1
MUSCULOSKELETAL NEGATIVE: 1
ENDOCRINE NEGATIVE: 1
CONSTITUTIONAL NEGATIVE: 1
ALLERGIC/IMMUNOLOGIC NEGATIVE: 1
HEMATURIA: 1
GASTROINTESTINAL NEGATIVE: 1
EYES NEGATIVE: 1
PSYCHIATRIC NEGATIVE: 1
NEUROLOGICAL NEGATIVE: 1
RESPIRATORY NEGATIVE: 1

## 2024-06-04 ASSESSMENT — ACTIVITIES OF DAILY LIVING (ADL)
DRESSING YOURSELF: NEEDS ASSISTANCE
GROOMING: NEEDS ASSISTANCE
BATHING: NEEDS ASSISTANCE
PATIENT'S MEMORY ADEQUATE TO SAFELY COMPLETE DAILY ACTIVITIES?: NO
JUDGMENT_ADEQUATE_SAFELY_COMPLETE_DAILY_ACTIVITIES: NO
FEEDING YOURSELF: INDEPENDENT
LACK_OF_TRANSPORTATION: NO
ADEQUATE_TO_COMPLETE_ADL: NO
ASSISTIVE_DEVICE: OTHER (COMMENT)
HEARING - RIGHT EAR: FUNCTIONAL
HEARING - LEFT EAR: FUNCTIONAL
TOILETING: DEPENDENT
WALKS IN HOME: DEPENDENT

## 2024-06-04 ASSESSMENT — COLUMBIA-SUICIDE SEVERITY RATING SCALE - C-SSRS
6. HAVE YOU EVER DONE ANYTHING, STARTED TO DO ANYTHING, OR PREPARED TO DO ANYTHING TO END YOUR LIFE?: NO
6. HAVE YOU EVER DONE ANYTHING, STARTED TO DO ANYTHING, OR PREPARED TO DO ANYTHING TO END YOUR LIFE?: NO
1. IN THE PAST MONTH, HAVE YOU WISHED YOU WERE DEAD OR WISHED YOU COULD GO TO SLEEP AND NOT WAKE UP?: NO
2. HAVE YOU ACTUALLY HAD ANY THOUGHTS OF KILLING YOURSELF?: NO
2. HAVE YOU ACTUALLY HAD ANY THOUGHTS OF KILLING YOURSELF?: NO
1. IN THE PAST MONTH, HAVE YOU WISHED YOU WERE DEAD OR WISHED YOU COULD GO TO SLEEP AND NOT WAKE UP?: NO

## 2024-06-04 ASSESSMENT — PAIN DESCRIPTION - PAIN TYPE: TYPE: ACUTE PAIN

## 2024-06-04 ASSESSMENT — PAIN - FUNCTIONAL ASSESSMENT
PAIN_FUNCTIONAL_ASSESSMENT: 0-10

## 2024-06-04 ASSESSMENT — PATIENT HEALTH QUESTIONNAIRE - PHQ9
1. LITTLE INTEREST OR PLEASURE IN DOING THINGS: NOT AT ALL
SUM OF ALL RESPONSES TO PHQ9 QUESTIONS 1 & 2: 0
2. FEELING DOWN, DEPRESSED OR HOPELESS: NOT AT ALL

## 2024-06-04 ASSESSMENT — PAIN DESCRIPTION - LOCATION: LOCATION: PERINEUM

## 2024-06-04 ASSESSMENT — COGNITIVE AND FUNCTIONAL STATUS - GENERAL: PATIENT BASELINE BEDBOUND: YES

## 2024-06-04 NOTE — ED PROVIDER NOTES
HPI   Chief Complaint   Patient presents with    Blood in Urine     Pt has a castaneda catheter.  States he has blood in the castaneda bag starting this morning.        24-year-old male presented emergency department with a chief complaint of blood in his Castaneda catheter.  He recently had cauda equina syndrome and had to have a lumbar decompression in which he developed neurogenic bladder now requiring Castaneda catheter.  His catheter has not yet been changed since the procedure which she reports was coming up on 2 months ago.  He is borderline febrile on arrival.  Minimally tachycardic.  Overall nontoxic appearing.  He denies chest pain shortness of breath.  No other complaint.                          [place] Coma Scale Score: 15                     Patient History   History reviewed. No pertinent past medical history.  Past Surgical History:   Procedure Laterality Date    LAMINECTOMY       No family history on file.  Social History     Tobacco Use    Smoking status: Never     Passive exposure: Never    Smokeless tobacco: Never   Vaping Use    Vaping status: Never Used   Substance Use Topics    Alcohol use: Never    Drug use: Never       Physical Exam   ED Triage Vitals [06/04/24 1242]   Temperature Heart Rate Respirations BP   37.8 °C (100 °F) (!) 116 18 (!) 171/116      Pulse Ox Temp src Heart Rate Source Patient Position   97 % -- -- --      BP Location FiO2 (%)     -- --       Physical Exam  Vitals and nursing note reviewed.   Constitutional:       Appearance: Normal appearance.   HENT:      Head: Normocephalic.      Nose: Nose normal.      Mouth/Throat:      Mouth: Mucous membranes are moist.   Cardiovascular:      Rate and Rhythm: Tachycardia present.   Pulmonary:      Effort: Pulmonary effort is normal.   Abdominal:      General: Abdomen is flat.   Musculoskeletal:         General: Normal range of motion.      Cervical back: Normal range of motion.   Skin:     General: Skin is warm.   Neurological:      General: No  focal deficit present.      Mental Status: He is alert and oriented to person, place, and time.   Psychiatric:         Mood and Affect: Mood normal.         ED Course & MDM   Diagnoses as of 06/04/24 1603   Severe sepsis (Multi)   Pyelonephritis   Hematuria, unspecified type       Medical Decision Making  I have seen and evaluated this patient.  The attending physician has also seen and evaluated this patient.  Vital signs, laboratory testing and diagnostic images if applicable have been reviewed.  All laboratory and imaging is interpreted by myself unless otherwise stated.  Radiology studies are also formally interpreted by radiologist.    Patient with no leukocytosis but does have 13% bandemia.  Metabolic panel without significant electrolyte abnormality or renal impairment.  Lactic acid is initially 4.5 down trended to 3.5.  Urinalysis is infected.  Overall picture is concerning for severe sepsis in the setting of pyelonephritis.  Patient is given 30 cc/kg fluid bolus with perfusion exam performed indicating adequate perfusion.  He has received broad-spectrum antibiotics.  Blood cultures were obtained prior to that.  He will be admitted for further treatment and management of severe sepsis.    I have seen and evaluated this patient and independently provided 31 minutes of nonconcurrent critical care time. This does not include separately billable procedures. Patient with high potential for deterioration, required frequent monitoring and assessment.    Labs Reviewed  BLOOD CULTURE - Abnormal     Blood Culture                   (*)                  BLOOD CULTURE BOTTLE                                 Gram Stain                      (*)               BLOOD CULTURE - Abnormal     Blood Culture                   (*)                  BLOOD CULTURE BOTTLE                                 Gram Stain                      (*)               COMPREHENSIVE METABOLIC PANEL - Abnormal     Glucose                       98                      Sodium                        144                    Potassium                     3.9                    Chloride                      106                    Bicarbonate                   25                     Urea Nitrogen                 17                     Creatinine                    0.70                   eGFR                          >90                    Calcium                       9.7                    Albumin                       4.3                    Alkaline Phosphatase          76                     Total Protein                 8.0 (*)                AST                           61 (*)                 Bilirubin, Total              0.8                    ALT                           194 (*)                Anion Gap                     13                  URINALYSIS WITH REFLEX MICROSCOPIC - Abnormal     Color, Urine                  Dark-Brown (*)               Appearance, Urine             Ex.Turbid (*)               Specific Gravity, Urine       1.018                  pH, Urine                     6.0                    Protein, Urine                100 (2+) (*)               Glucose, Urine                Normal                 Blood, Urine                  OVER (3+) (*)               Ketones, Urine                NEGATIVE                Bilirubin, Urine              NEGATIVE                Urobilinogen, Urine           Normal                 Nitrite, Urine                NEGATIVE                Leukocyte Esterase, Urine     500 Jalil/µL (*)            MICROSCOPIC ONLY, URINE - Abnormal     WBC, Urine                    >50 (*)                WBC Clumps, Urine             MANY                   RBC, Urine                    >20 (*)                Squamous Epithelial Cells, Urine                          Bacteria, Urine               3+ (*)                 Mucus, Urine                  2+                  BLOOD GAS LACTIC ACID, VENOUS - Abnormal     POCT Lactate, Venous           4.5 (*)             BLOOD GAS LACTIC ACID, VENOUS - Abnormal     POCT Lactate, Venous          3.6 (*)             CBC WITH AUTO DIFFERENTIAL - Abnormal     WBC                           25.4 (*)               nRBC                          0.0                    RBC                           4.28 (*)               Hemoglobin                    11.8 (*)               Hematocrit                    37.1 (*)               MCV                           87                     MCH                           27.6                   MCHC                          31.8 (*)               RDW                           14.9 (*)               Platelets                     247                    Neutrophils %                 95.2                   Immature Granulocytes %, Automated   1.2 (*)                Lymphocytes %                 1.1                    Monocytes %                   2.3                    Eosinophils %                 0.0                    Basophils %                   0.2                    Neutrophils Absolute          24.23 (*)               Immature Granulocytes Absolute, Au*   0.31                   Lymphocytes Absolute          0.27 (*)               Monocytes Absolute            0.58                   Eosinophils Absolute          0.00                   Basophils Absolute            0.04                COMPREHENSIVE METABOLIC PANEL - Abnormal     Glucose                       103 (*)                Sodium                        138                    Potassium                     4.5                    Chloride                      105                    Bicarbonate                   20 (*)                 Urea Nitrogen                 23                     Creatinine                    1.30                   eGFR                          79                     Calcium                       8.1 (*)                Albumin                       3.3 (*)                Alkaline Phosphatase          65                      Total Protein                 6.2                    AST                           56 (*)                 Bilirubin, Total              1.4 (*)                ALT                           157 (*)                Anion Gap                     13                  BLOOD GAS LACTIC ACID, VENOUS - Abnormal     POCT Lactate, Venous          6.3 (*)             VANCOMYCIN - Abnormal     Vancomycin                    47.1 (*)            COMPREHENSIVE METABOLIC PANEL - Abnormal     Glucose                       100 (*)                Sodium                        136                    Potassium                     3.8                    Chloride                      103                    Bicarbonate                   20 (*)                 Urea Nitrogen                 19                     Creatinine                    1.40                   eGFR                          72                     Calcium                       8.4 (*)                Albumin                       3.3 (*)                Alkaline Phosphatase          67                     Total Protein                 5.9                    AST                           65 (*)                 Bilirubin, Total              0.9                    ALT                           166 (*)                Anion Gap                     13                  CBC - Abnormal     WBC                           21.2 (*)               nRBC                          0.0                    RBC                           3.98 (*)               Hemoglobin                    11.1 (*)               Hematocrit                    34.0 (*)               MCV                           85                     MCH                           27.9                   MCHC                          32.6                   RDW                           14.6 (*)               Platelets                     239                 N-TERMINAL PROBNP - Abnormal     PROBNP                        451  (*)                  Narrative: Reference ranges are based on clinical submission data. These ranges represent the 95th percentile of normal cut-off points. As NT Pro- BNP values approach 1000 pg/ml, clinical symptoms are more likely associated with CHF.  CBC WITH AUTO DIFFERENTIAL - Abnormal     WBC                           7.9                    nRBC                          0.0                    RBC                           3.93 (*)               Hemoglobin                    10.7 (*)               Hematocrit                    32.9 (*)               MCV                           84                     MCH                           27.2                   MCHC                          32.5                   RDW                           14.8 (*)               Platelets                     143 (*)                Neutrophils %                 82.2                   Immature Granulocytes %, Automated   0.9                    Lymphocytes %                 10.0                   Monocytes %                   6.1                    Eosinophils %                 0.5                    Basophils %                   0.3                    Neutrophils Absolute          6.53                   Immature Granulocytes Absolute, Au*   0.07                   Lymphocytes Absolute          0.79 (*)               Monocytes Absolute            0.48                   Eosinophils Absolute          0.04                   Basophils Absolute            0.02                COMPREHENSIVE METABOLIC PANEL - Abnormal     Glucose                       98                     Sodium                        138                    Potassium                     3.6                    Chloride                      106                    Bicarbonate                   22 (*)                 Urea Nitrogen                 12                     Creatinine                    0.70                   eGFR                          >90                     Calcium                       8.3 (*)                Albumin                       3.0 (*)                Alkaline Phosphatase          88                     Total Protein                 6.2                    AST                           84 (*)                 Bilirubin, Total              1.6 (*)                ALT                           154 (*)                Anion Gap                     10                  CBC WITH AUTO DIFFERENTIAL - Abnormal     WBC                           5.9                    nRBC                          0.0                    RBC                           4.01 (*)               Hemoglobin                    10.9 (*)               Hematocrit                    34.2 (*)               MCV                           85                     MCH                           27.2                   MCHC                          31.9 (*)               RDW                           14.9 (*)               Platelets                     138 (*)                Neutrophils %                 66.9                   Immature Granulocytes %, Automated   0.3                    Lymphocytes %                 20.6                   Monocytes %                   9.1                    Eosinophils %                 2.9                    Basophils %                   0.2                    Neutrophils Absolute          3.96                   Immature Granulocytes Absolute, Au*   0.02                   Lymphocytes Absolute          1.22                   Monocytes Absolute            0.54                   Eosinophils Absolute          0.17                   Basophils Absolute            0.01                COMPREHENSIVE METABOLIC PANEL - Abnormal     Glucose                       108 (*)                Sodium                        141                    Potassium                     3.9                    Chloride                      106                    Bicarbonate                   26                      Urea Nitrogen                 7 (*)                  Creatinine                    0.60                   eGFR                          >90                    Calcium                       8.5                    Albumin                       3.1 (*)                Alkaline Phosphatase          104                    Total Protein                 6.2                    AST                           78 (*)                 Bilirubin, Total              0.9                    ALT                           154 (*)                Anion Gap                     9                   CBC - Abnormal     WBC                           6.5                    nRBC                          0.0                    RBC                           4.20 (*)               Hemoglobin                    11.5 (*)               Hematocrit                    35.9 (*)               MCV                           86                     MCH                           27.4                   MCHC                          32.0                   RDW                           14.7 (*)               Platelets                     182                 BLOOD CULTURE - Normal     Blood Culture                                     BLOOD CULTURE - Normal     Blood Culture                                     URINE CULTURE - Normal     Urine Culture                 No growth             CBC WITH AUTO DIFFERENTIAL - Normal     WBC                           4.7                    nRBC                          0.0                    RBC                           5.17                   Hemoglobin                    14.2                   Hematocrit                    44.4                   MCV                           86                     MCH                           27.5                   MCHC                          32.0                   RDW                           14.1                   Platelets                     375                    Immature  Granulocytes %, Automated   0.2                    Immature Granulocytes Absolute, Au*   0.01                     Narrative: The previously reported component Neutrophils % is no longer being reported.The previously reported component Lymphocytes % is no longer being reported.The previously reported component Monocytes % is no longer being reported.The previously                 reported component Eosinophils % is no longer being reported.The previously reported component Basophils % is no longer being reported.The previously reported component Absolute Neutrophils is no longer being reported.The previously reported                 component Absolute Lymphocytes is no longer being reported.The previously reported component Absolute Monocytes is no longer being reported.The previously reported component Absolute Eosinophils is no longer being reported.The previously reported                 component Absolute Basophils is no longer being reported.  MAGNESIUM - Normal     Magnesium                     2.00                BLOOD GAS LACTIC ACID, VENOUS - Normal     POCT Lactate, Venous          2.0                 CREATINE KINASE - Normal     Creatine Kinase               175                 SERIAL TROPONIN, INITIAL ([facility]) - Normal     Troponin T, High Sensitivity   9                   SERIAL TROPONIN, 6 HOUR ([facility]) - Normal     Troponin T, High Sensitivity   9                   MAGNESIUM - Normal     Magnesium                     1.70                MAGNESIUM - Normal     Magnesium                     1.80                BASIC METABOLIC PANEL - Normal     Glucose                       86                     Sodium                        141                    Potassium                     4.1                    Chloride                      105                    Bicarbonate                   24                     Urea Nitrogen                 9                      Creatinine                    0.50                    eGFR                          >90                    Calcium                       8.9                    Anion Gap                     12                  TROPONIN T SERIES, HIGH SENSITIVITY (0, 2 HR, 6 HR)       Narrative: The following orders were created for panel order Troponin T Series, High Sensitivity (0, 2HR, 6HR).                Procedure                               Abnormality         Status                                   ---------                               -----------         ------                                   Serial Troponin, Initial...[[id number]]  Normal              Final result                             Serial Troponin, 2 Hour ...[[id number]]                                                               Serial Troponin, 6 Hour ...[[id number]]  Normal              Final result                                             Please view results for these tests on the individual orders.  SERIAL TROPONIN,  2 HOUR ([facility])  MANUAL DIFFERENTIAL  CT lumbar spine wo IV contrast   Final Result    Postoperative changes L4-L5 laminectomy, since 04/30/2024.    Diffuse subcutaneous edema most prominent at L4-L5. No drainable    fluid collection. Within limits of evaluation, no evidence of    discitis/osteomyelitis.          Signed by: Derrick Casas 6/8/2024 4:10 PM    Dictation workstation:   [id number]     Transthoracic Echo (TTE) Complete   Final Result     XR chest 1 view   Final Result    No acute cardiopulmonary process.                MACRO:    None.          Signed by: Isaias Wing 6/5/2024 3:35 AM    Dictation workstation:   [id number]     CT abdomen pelvis w IV contrast   Final Result    1. Godoy catheter insufflated in the bladder. There is hyperdense    material in the bladder lumen. This is either iatrogenic from    catheter placement or hemorrhagic cystitis.    2. Fatty infiltration of the liver.          MACRO:    None          Signed by: Julianna Tolliver 6/4/2024 3:32 PM     Dictation workstation:   [id number]     Medications  acetaminophen (Tylenol) tablet 975 mg (975 mg oral Not Given 6/4/24 1355)  docusate sodium (Colace) capsule 100 mg (100 mg oral Given 6/9/24 1009)  methocarbamol (Robaxin) tablet 500 mg (has no administration in time range)  enoxaparin (Lovenox) syringe 40 mg (40 mg subcutaneous Given 6/9/24 0507)  polyethylene glycol (Glycolax, Miralax) packet 17 g (17 g oral Given 6/9/24 1009)  lidocaine (Xylocaine) 10 mg/mL (1 %) injection 50 mg (50 mg infiltration Not Given 6/5/24 0500)  meropenem (Merrem) in sodium chloride 0.9 % 100 mL IV 2 g (0 g intravenous Stopped 6/9/24 1038)  acetaminophen (Tylenol) tablet 650 mg (650 mg oral Given 6/5/24 2033)  traZODone (Desyrel) tablet 50 mg (has no administration in time range)  cefTRIAXone (Rocephin) IVPB 1 g (0 g intravenous Stopped 6/4/24 1426)  sodium chloride 0.9 % bolus 1,000 mL (0 mL intravenous Stopped 6/4/24 1426)  ketorolac (Toradol) injection 15 mg (15 mg intravenous Given 6/4/24 1355)  ondansetron (Zofran) injection 4 mg (4 mg intravenous Given 6/4/24 1355)  iohexol (OMNIPaque) 350 mg iodine/mL solution 75 mL (75 mL intravenous Given 6/4/24 1446)  sodium chloride 0.9 % bolus 1,000 mL (0 mL intravenous Stopped 6/4/24 1541)  sodium chloride 0.9 % bolus 1,000 mL (0 mL intravenous Stopped 6/4/24 1541)  vancomycin 1.5 g in 300 mL (Xellia) IVPB 1.5 g (0 g intravenous Stopped 6/4/24 1931)  tobramycin (Nebcin) 700 mg in dextrose 5% 100 mL IV (0 mg intravenous Stopped 6/4/24 1742)  lactated Ringer's bolus 1,000 mL (0 mL intravenous Stopped 6/4/24 1926)  sodium chloride 0.9 % bolus 1,000 mL (0 mL intravenous Stopped 6/4/24 2338)  ketorolac (Toradol) injection 15 mg (15 mg intravenous Given 6/5/24 8480)  perflutren lipid microspheres (Definity) injection 0.5-10 mL of dilution (2 mL of dilution intravenous Given 6/5/24 1508)  Current Discharge Medication List                Procedure  Procedures     Frankie Iverson PA-C  06/09/24  [id number]

## 2024-06-04 NOTE — H&P
"History Of Present Illness  Erwin Cline is a 24 y.o. 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 Pt was discharged to SNF after surgery and apparently was having issues with incontinence therefore castaneda was placed. Parents state they feel out of laziness that pt was \"peeing everywhere\". Due to mental health issues the patient has been pulling on his castaneda, resulting in hematuria. Upon  ED evaluation the patient is noted to have a UTI and is triggering SIRS. The parents are at their wits end and state the patients mental health issues are affecting his recovery. They are requesting psych evaluation. Denies fever or chills.  He states has felt quite well however fails to engage in conversation. He is refusing to allow me to examine him or see his back incision. Apparently the patient did have a wound VAC for a short time post-operatively however pulled that out. CT abd/pelvis was done in ED and shows blood material in the bladder.      Past Medical History  Past Medical History:   Diagnosis Date    ADHD (attention deficit hyperactivity disorder)     Asperger's syndrome ([facility])     OCD (obsessive compulsive disorder)        Surgical History  Past Surgical History:   Procedure Laterality Date    LAMINECTOMY      LUMBAR LAMINECTOMY          Social History  He reports that he has never smoked. He has never been exposed to tobacco smoke. He has never used smokeless tobacco. He reports that he does not drink alcohol and does not use drugs.    Family History  Family History   Problem Relation Name Age of Onset    No Known Problems Mother      No Known Problems Father          Allergies  Penicillins    Review of Systems   Constitutional: Negative.    HENT: Negative.     Eyes: Negative.    Respiratory: Negative.     Cardiovascular: Negative.    Gastrointestinal: Negative.    Endocrine: Negative.    Genitourinary: " " Positive for hematuria.   Musculoskeletal: Negative.    Skin: Negative.    Allergic/Immunologic: Negative.    Neurological: Negative.    Hematological: Negative.    Psychiatric/Behavioral: Negative.          Physical Exam  Constitutional:       Appearance: He is obese.      Comments: Morbidly obese   HENT:      Head: Normocephalic and atraumatic.      Nose: Nose normal.      Mouth/Throat:      Mouth: Mucous membranes are moist.      Pharynx: Oropharynx is clear.   Eyes:      Extraocular Movements: Extraocular movements intact.      Pupils: Pupils are equal, round, and reactive to light.   Cardiovascular:      Rate and Rhythm: Regular rhythm. Tachycardia present.      Pulses: Normal pulses.   Pulmonary:      Effort: Pulmonary effort is normal.      Breath sounds: Normal breath sounds.   Abdominal:      General: Abdomen is flat. Bowel sounds are normal.      Palpations: Abdomen is soft.   Genitourinary:     Comments: Gross hematuria  Musculoskeletal:         General: Normal range of motion.   Skin:     General: Skin is warm and dry.      Capillary Refill: Capillary refill takes less than 2 seconds.   Neurological:      General: No focal deficit present.      Mental Status: He is oriented to person, place, and time.   Psychiatric:         Mood and Affect: Mood normal.          Last Recorded Vitals  Blood pressure 139/51, pulse (!) 140, temperature 36.7 °C (98.1 °F), temperature source Temporal, resp. rate 20, height 1.905 m (6' 3\"), weight (!) 221 kg (487 lb), SpO2 98%.    Relevant Results  Results for orders placed or performed during the hospital encounter of 06/04/24 (from the past 24 hour(s))   CBC and Auto Differential   Result Value Ref Range    WBC 4.7 4.4 - 11.3 x10*3/uL    nRBC 0.0 0.0 - 0.0 /100 WBCs    RBC 5.17 4.50 - 5.90 x10*6/uL    Hemoglobin 14.2 13.5 - 17.5 g/dL    Hematocrit 44.4 41.0 - 52.0 %    MCV 86 80 - 100 fL    MCH 27.5 26.0 - 34.0 pg    MCHC 32.0 32.0 - 36.0 g/dL    RDW 14.1 11.5 - 14.5 %    " Platelets 375 150 - 450 x10*3/uL    Immature Granulocytes %, Automated 0.2 0.0 - 0.9 %    Immature Granulocytes Absolute, Automated 0.01 0.00 - 0.70 x10*3/uL   Comprehensive metabolic panel   Result Value Ref Range    Glucose 98 65 - 99 mg/dL    Sodium 144 133 - 145 mmol/L    Potassium 3.9 3.4 - 5.1 mmol/L    Chloride 106 97 - 107 mmol/L    Bicarbonate 25 24 - 31 mmol/L    Urea Nitrogen 17 8 - 25 mg/dL    Creatinine 0.70 0.40 - 1.60 mg/dL    eGFR >90 >60 mL/min/1.73m*2    Calcium 9.7 8.5 - 10.4 mg/dL    Albumin 4.3 3.5 - 5.0 g/dL    Alkaline Phosphatase 76 35 - 125 U/L    Total Protein 8.0 (H) 5.9 - 7.9 g/dL    AST 61 (H) 5 - 40 U/L    Bilirubin, Total 0.8 0.1 - 1.2 mg/dL     (H) 5 - 40 U/L    Anion Gap 13 <=19 mmol/L   Manual Differential   Result Value Ref Range    Neutrophils %, Manual 49.0 40.0 - 80.0 %    Bands %, Manual 13.0 0.0 - 5.0 %    Lymphocytes %, Manual 26.0 13.0 - 44.0 %    Monocytes %, Manual 3.0 2.0 - 10.0 %    Eosinophils %, Manual 6.0 0.0 - 6.0 %    Basophils %, Manual 0.0 0.0 - 2.0 %    Atypical Lymphocytes %, Manual 3.0 0.0 - 2.0 %    Seg Neutrophils Absolute, Manual 2.30 1.20 - 7.00 x10*3/uL    Bands Absolute, Manual 0.61 0.00 - 0.70 x10*3/uL    Lymphocytes Absolute, Manual 1.22 1.20 - 4.80 x10*3/uL    Monocytes Absolute, Manual 0.14 0.10 - 1.00 x10*3/uL    Eosinophils Absolute, Manual 0.28 0.00 - 0.70 x10*3/uL    Basophils Absolute, Manual 0.00 0.00 - 0.10 x10*3/uL    Atypical Lymphs Absolute, Manual 0.14 0.00 - 0.50 x10*3/uL    Total Cells Counted 100     Neutrophils Absolute, Manual 2.91 1.20 - 7.70 x10*3/uL    RBC Morphology No significant RBC morphology present    Urinalysis with Reflex Microscopic   Result Value Ref Range    Color, Urine Dark-Brown (N) Light-Yellow, Yellow, Dark-Yellow    Appearance, Urine Ex.Turbid (N) Clear    Specific Gravity, Urine 1.018 1.005 - 1.035    pH, Urine 6.0 5.0, 5.5, 6.0, 6.5, 7.0, 7.5, 8.0    Protein, Urine 100 (2+) (A) NEGATIVE, 10 (TRACE), 20  (TRACE) mg/dL    Glucose, Urine Normal Normal mg/dL    Blood, Urine OVER (3+) (A) NEGATIVE    Ketones, Urine NEGATIVE NEGATIVE mg/dL    Bilirubin, Urine NEGATIVE NEGATIVE    Urobilinogen, Urine Normal Normal mg/dL    Nitrite, Urine NEGATIVE NEGATIVE    Leukocyte Esterase, Urine 500 Jalil/µL (A) NEGATIVE   Microscopic Only, Urine   Result Value Ref Range    WBC, Urine >50 (A) 1-5, NONE /HPF    WBC Clumps, Urine MANY Reference range not established. /HPF    RBC, Urine >20 (A) NONE, 1-2, 3-5 /HPF    Squamous Epithelial Cells, Urine 1-9 (SPARSE) Reference range not established. /HPF    Bacteria, Urine 3+ (A) NONE SEEN /HPF    Mucus, Urine 2+ Reference range not established. /LPF   Blood Gas Lactic Acid, Venous   Result Value Ref Range    POCT Lactate, Venous 4.5 (HH) 0.4 - 2.0 mmol/L   Blood Gas Lactic Acid, Venous   Result Value Ref Range    POCT Lactate, Venous 3.6 (H) 0.4 - 2.0 mmol/L     Assessment/Plan   Gross Hematuria  -Likely secondary to pulling on castaneda  -Irrigate prn  -Consult Urology  -Hgb stable, monitor     UTI  -IV Rocephin pending culture    SIRS  -No leukocytosis. Triggering for lactate and HR. BP elevated  -Follow blood cultures  -IV Rocephin for UTI  -S/p IV fluid bolus 3 liters in ED. Will continue IV fluids    Aspergers/OCD/ADHD/PPNOS  -Parents very concerned mental health issues are affecting his QOL and recovery. Unwilling to care for self.   -Consult     Morbid Obesity    DVT Prophylaxis  -No pharm agent due to bleeding  -SCD's    Dispo  PT/OT to evaluate    Edelmira Powell, MARCELINO-CNP

## 2024-06-05 ENCOUNTER — APPOINTMENT (OUTPATIENT)
Dept: CARDIOLOGY | Facility: HOSPITAL | Age: 24
DRG: 871 | End: 2024-06-05
Payer: COMMERCIAL

## 2024-06-05 ENCOUNTER — APPOINTMENT (OUTPATIENT)
Dept: RADIOLOGY | Facility: HOSPITAL | Age: 24
DRG: 871 | End: 2024-06-05
Payer: COMMERCIAL

## 2024-06-05 ENCOUNTER — APPOINTMENT (OUTPATIENT)
Dept: CARDIOLOGY | Facility: HOSPITAL | Age: 24
End: 2024-06-05
Payer: COMMERCIAL

## 2024-06-05 PROBLEM — R65.21 SEPTIC SHOCK (MULTI): Status: ACTIVE | Noted: 2024-06-04

## 2024-06-05 PROBLEM — B96.20 E COLI BACTEREMIA: Status: ACTIVE | Noted: 2024-06-05

## 2024-06-05 PROBLEM — A41.50 GRAM NEGATIVE SEPSIS (MULTI): Status: ACTIVE | Noted: 2024-06-05

## 2024-06-05 PROBLEM — R78.81 E COLI BACTEREMIA: Status: ACTIVE | Noted: 2024-06-05

## 2024-06-05 LAB
ALBUMIN SERPL-MCNC: 3.3 G/DL (ref 3.5–5)
ALBUMIN SERPL-MCNC: 3.3 G/DL (ref 3.5–5)
ALP BLD-CCNC: 65 U/L (ref 35–125)
ALP BLD-CCNC: 67 U/L (ref 35–125)
ALT SERPL-CCNC: 157 U/L (ref 5–40)
ALT SERPL-CCNC: 166 U/L (ref 5–40)
ANION GAP SERPL CALC-SCNC: 13 MMOL/L
ANION GAP SERPL CALC-SCNC: 13 MMOL/L
AORTIC VALVE MEAN GRADIENT: 5 MMHG
AORTIC VALVE PEAK VELOCITY: 1.53 M/S
AST SERPL-CCNC: 56 U/L (ref 5–40)
AST SERPL-CCNC: 65 U/L (ref 5–40)
ATRIAL RATE: 130 BPM
AV PEAK GRADIENT: 9.4 MMHG
AVA (PEAK VEL): 3.06 CM2
AVA (VTI): 3.17 CM2
BASOPHILS # BLD AUTO: 0.04 X10*3/UL (ref 0–0.1)
BASOPHILS NFR BLD AUTO: 0.2 %
BILIRUB SERPL-MCNC: 0.9 MG/DL (ref 0.1–1.2)
BILIRUB SERPL-MCNC: 1.4 MG/DL (ref 0.1–1.2)
BUN SERPL-MCNC: 19 MG/DL (ref 8–25)
BUN SERPL-MCNC: 23 MG/DL (ref 8–25)
CALCIUM SERPL-MCNC: 8.1 MG/DL (ref 8.5–10.4)
CALCIUM SERPL-MCNC: 8.4 MG/DL (ref 8.5–10.4)
CHLORIDE SERPL-SCNC: 103 MMOL/L (ref 97–107)
CHLORIDE SERPL-SCNC: 105 MMOL/L (ref 97–107)
CK SERPL-CCNC: 175 U/L (ref 24–195)
CO2 SERPL-SCNC: 20 MMOL/L (ref 24–31)
CO2 SERPL-SCNC: 20 MMOL/L (ref 24–31)
CREAT SERPL-MCNC: 1.3 MG/DL (ref 0.4–1.6)
CREAT SERPL-MCNC: 1.4 MG/DL (ref 0.4–1.6)
EGFRCR SERPLBLD CKD-EPI 2021: 72 ML/MIN/1.73M*2
EGFRCR SERPLBLD CKD-EPI 2021: 79 ML/MIN/1.73M*2
EOSINOPHIL # BLD AUTO: 0 X10*3/UL (ref 0–0.7)
EOSINOPHIL NFR BLD AUTO: 0 %
ERYTHROCYTE [DISTWIDTH] IN BLOOD BY AUTOMATED COUNT: 14.9 % (ref 11.5–14.5)
GLUCOSE SERPL-MCNC: 100 MG/DL (ref 65–99)
GLUCOSE SERPL-MCNC: 103 MG/DL (ref 65–99)
HCT VFR BLD AUTO: 37.1 % (ref 41–52)
HGB BLD-MCNC: 11.8 G/DL (ref 13.5–17.5)
HOLD SPECIMEN: NORMAL
IMM GRANULOCYTES # BLD AUTO: 0.31 X10*3/UL (ref 0–0.7)
IMM GRANULOCYTES NFR BLD AUTO: 1.2 % (ref 0–0.9)
LACTATE BLDV-SCNC: 2 MMOL/L (ref 0.4–2)
LEFT VENTRICULAR OUTFLOW TRACT DIAMETER: 2.2 CM
LYMPHOCYTES # BLD AUTO: 0.27 X10*3/UL (ref 1.2–4.8)
LYMPHOCYTES NFR BLD AUTO: 1.1 %
MCH RBC QN AUTO: 27.6 PG (ref 26–34)
MCHC RBC AUTO-ENTMCNC: 31.8 G/DL (ref 32–36)
MCV RBC AUTO: 87 FL (ref 80–100)
MONOCYTES # BLD AUTO: 0.58 X10*3/UL (ref 0.1–1)
MONOCYTES NFR BLD AUTO: 2.3 %
NEUTROPHILS # BLD AUTO: 24.23 X10*3/UL (ref 1.2–7.7)
NEUTROPHILS NFR BLD AUTO: 95.2 %
NRBC BLD-RTO: 0 /100 WBCS (ref 0–0)
NT-PROBNP SERPL-MCNC: 451 PG/ML (ref 0–93)
P AXIS: 72 DEGREES
P OFFSET: 207 MS
P ONSET: 155 MS
PLATELET # BLD AUTO: 247 X10*3/UL (ref 150–450)
POTASSIUM SERPL-SCNC: 3.8 MMOL/L (ref 3.4–5.1)
POTASSIUM SERPL-SCNC: 4.5 MMOL/L (ref 3.4–5.1)
PR INTERVAL: 128 MS
PROT SERPL-MCNC: 5.9 G/DL (ref 5.9–7.9)
PROT SERPL-MCNC: 6.2 G/DL (ref 5.9–7.9)
Q ONSET: 219 MS
QRS COUNT: 21 BEATS
QRS DURATION: 82 MS
QT INTERVAL: 296 MS
QTC CALCULATION(BAZETT): 435 MS
QTC FREDERICIA: 383 MS
R AXIS: 50 DEGREES
RBC # BLD AUTO: 4.28 X10*6/UL (ref 4.5–5.9)
SODIUM SERPL-SCNC: 136 MMOL/L (ref 133–145)
SODIUM SERPL-SCNC: 138 MMOL/L (ref 133–145)
T AXIS: 57 DEGREES
T OFFSET: 367 MS
TROPONIN T SERPL-MCNC: 9 NG/L
TROPONIN T SERPL-MCNC: 9 NG/L
VANCOMYCIN SERPL-MCNC: 47.1 UG/ML (ref 10–20)
VENTRICULAR RATE: 130 BPM
WBC # BLD AUTO: 25.4 X10*3/UL (ref 4.4–11.3)

## 2024-06-05 PROCEDURE — 80202 ASSAY OF VANCOMYCIN: CPT | Performed by: INTERNAL MEDICINE

## 2024-06-05 PROCEDURE — 97166 OT EVAL MOD COMPLEX 45 MIN: CPT | Mod: GO

## 2024-06-05 PROCEDURE — 2500000004 HC RX 250 GENERAL PHARMACY W/ HCPCS (ALT 636 FOR OP/ED): Performed by: INTERNAL MEDICINE

## 2024-06-05 PROCEDURE — 97535 SELF CARE MNGMENT TRAINING: CPT | Mod: GO

## 2024-06-05 PROCEDURE — 93306 TTE W/DOPPLER COMPLETE: CPT

## 2024-06-05 PROCEDURE — 2500000005 HC RX 250 GENERAL PHARMACY W/O HCPCS: Performed by: INTERNAL MEDICINE

## 2024-06-05 PROCEDURE — 93306 TTE W/DOPPLER COMPLETE: CPT | Performed by: INTERNAL MEDICINE

## 2024-06-05 PROCEDURE — 97530 THERAPEUTIC ACTIVITIES: CPT | Mod: GP

## 2024-06-05 PROCEDURE — 71045 X-RAY EXAM CHEST 1 VIEW: CPT

## 2024-06-05 PROCEDURE — 2500000001 HC RX 250 WO HCPCS SELF ADMINISTERED DRUGS (ALT 637 FOR MEDICARE OP): Performed by: NURSE PRACTITIONER

## 2024-06-05 PROCEDURE — 36415 COLL VENOUS BLD VENIPUNCTURE: CPT | Performed by: INTERNAL MEDICINE

## 2024-06-05 PROCEDURE — 84075 ASSAY ALKALINE PHOSPHATASE: CPT | Performed by: INTERNAL MEDICINE

## 2024-06-05 PROCEDURE — 97162 PT EVAL MOD COMPLEX 30 MIN: CPT | Mod: GP

## 2024-06-05 PROCEDURE — 71045 X-RAY EXAM CHEST 1 VIEW: CPT | Performed by: STUDENT IN AN ORGANIZED HEALTH CARE EDUCATION/TRAINING PROGRAM

## 2024-06-05 PROCEDURE — 2060000001 HC INTERMEDIATE ICU ROOM DAILY

## 2024-06-05 PROCEDURE — 84484 ASSAY OF TROPONIN QUANT: CPT | Performed by: INTERNAL MEDICINE

## 2024-06-05 PROCEDURE — 83605 ASSAY OF LACTIC ACID: CPT | Performed by: INTERNAL MEDICINE

## 2024-06-05 PROCEDURE — 93005 ELECTROCARDIOGRAM TRACING: CPT

## 2024-06-05 PROCEDURE — 85025 COMPLETE CBC W/AUTO DIFF WBC: CPT | Performed by: INTERNAL MEDICINE

## 2024-06-05 RX ORDER — IBUPROFEN 400 MG/1
800 TABLET ORAL ONCE
Status: DISCONTINUED | OUTPATIENT
Start: 2024-06-05 | End: 2024-06-05

## 2024-06-05 RX ORDER — MEROPENEM 1 G/1
2 INJECTION, POWDER, FOR SOLUTION INTRAVENOUS EVERY 8 HOURS
Status: DISCONTINUED | OUTPATIENT
Start: 2024-06-05 | End: 2024-06-09

## 2024-06-05 RX ORDER — NOREPINEPHRINE BITARTRATE/D5W 8 MG/250ML
0-.2 PLASTIC BAG, INJECTION (ML) INTRAVENOUS CONTINUOUS
Status: DISCONTINUED | OUTPATIENT
Start: 2024-06-05 | End: 2024-06-05

## 2024-06-05 RX ORDER — LIDOCAINE HYDROCHLORIDE 10 MG/ML
5 INJECTION INFILTRATION; PERINEURAL ONCE
Status: DISCONTINUED | OUTPATIENT
Start: 2024-06-05 | End: 2024-06-11 | Stop reason: HOSPADM

## 2024-06-05 RX ORDER — ACETAMINOPHEN 325 MG/1
650 TABLET ORAL EVERY 8 HOURS PRN
Status: DISCONTINUED | OUTPATIENT
Start: 2024-06-05 | End: 2024-06-11 | Stop reason: HOSPADM

## 2024-06-05 RX ORDER — DEXTROSE MONOHYDRATE, SODIUM CHLORIDE, SODIUM LACTATE, POTASSIUM CHLORIDE, CALCIUM CHLORIDE 5; 600; 310; 179; 20 G/100ML; MG/100ML; MG/100ML; MG/100ML; MG/100ML
75 INJECTION, SOLUTION INTRAVENOUS CONTINUOUS
Status: DISCONTINUED | OUTPATIENT
Start: 2024-06-05 | End: 2024-06-07

## 2024-06-05 RX ORDER — KETOROLAC TROMETHAMINE 30 MG/ML
15 INJECTION, SOLUTION INTRAMUSCULAR; INTRAVENOUS ONCE
Status: COMPLETED | OUTPATIENT
Start: 2024-06-05 | End: 2024-06-05

## 2024-06-05 RX ORDER — SODIUM CHLORIDE 9 MG/ML
125 INJECTION, SOLUTION INTRAVENOUS CONTINUOUS
Status: DISCONTINUED | OUTPATIENT
Start: 2024-06-05 | End: 2024-06-05

## 2024-06-05 RX ADMIN — ACETAMINOPHEN 650 MG: 325 TABLET ORAL at 20:33

## 2024-06-05 RX ADMIN — VANCOMYCIN 2 G: 2 INJECTION, SOLUTION INTRAVENOUS at 10:11

## 2024-06-05 RX ADMIN — DEXTROSE MONOHYDRATE, SODIUM CHLORIDE, SODIUM LACTATE, POTASSIUM CHLORIDE, CALCIUM CHLORIDE 75 ML/HR: 5; 600; 310; 179; 20 INJECTION, SOLUTION INTRAVENOUS at 14:03

## 2024-06-05 RX ADMIN — DOCUSATE SODIUM 100 MG: 100 CAPSULE, LIQUID FILLED ORAL at 08:23

## 2024-06-05 RX ADMIN — DOCUSATE SODIUM 100 MG: 100 CAPSULE, LIQUID FILLED ORAL at 20:35

## 2024-06-05 RX ADMIN — ACETAMINOPHEN 975 MG: 325 TABLET ORAL at 05:34

## 2024-06-05 RX ADMIN — KETOROLAC TROMETHAMINE 15 MG: 30 INJECTION INTRAMUSCULAR; INTRAVENOUS at 03:50

## 2024-06-05 RX ADMIN — SODIUM CHLORIDE 125 ML/HR: 900 INJECTION, SOLUTION INTRAVENOUS at 02:07

## 2024-06-05 RX ADMIN — MEROPENEM AND SODIUM CHLORIDE 1 G: 1 INJECTION, SOLUTION INTRAVENOUS at 05:45

## 2024-06-05 RX ADMIN — PERFLUTREN 2 ML OF DILUTION: 6.52 INJECTION, SUSPENSION INTRAVENOUS at 15:04

## 2024-06-05 RX ADMIN — VANCOMYCIN 2 G: 2 INJECTION, SOLUTION INTRAVENOUS at 03:17

## 2024-06-05 RX ADMIN — MEROPENEM 2 G: 2 INJECTION, POWDER, FOR SOLUTION INTRAVENOUS at 17:25

## 2024-06-05 RX ADMIN — NOREPINEPHRINE BITARTRATE 0.01 MCG/KG/MIN: 8 INJECTION, SOLUTION INTRAVENOUS at 04:28

## 2024-06-05 RX ADMIN — ENOXAPARIN SODIUM 40 MG: 40 INJECTION SUBCUTANEOUS at 08:22

## 2024-06-05 SDOH — SOCIAL STABILITY: SOCIAL INSECURITY: WITHIN THE LAST YEAR, HAVE YOU BEEN HUMILIATED OR EMOTIONALLY ABUSED IN OTHER WAYS BY YOUR PARTNER OR EX-PARTNER?: NO

## 2024-06-05 SDOH — ECONOMIC STABILITY: FOOD INSECURITY: WITHIN THE PAST 12 MONTHS, YOU WORRIED THAT YOUR FOOD WOULD RUN OUT BEFORE YOU GOT MONEY TO BUY MORE.: NEVER TRUE

## 2024-06-05 SDOH — ECONOMIC STABILITY: INCOME INSECURITY: IN THE LAST 12 MONTHS, WAS THERE A TIME WHEN YOU WERE NOT ABLE TO PAY THE MORTGAGE OR RENT ON TIME?: NO

## 2024-06-05 SDOH — SOCIAL STABILITY: SOCIAL INSECURITY
WITHIN THE LAST YEAR, HAVE YOU BEEN KICKED, HIT, SLAPPED, OR OTHERWISE PHYSICALLY HURT BY YOUR PARTNER OR EX-PARTNER?: NO

## 2024-06-05 SDOH — ECONOMIC STABILITY: FOOD INSECURITY: WITHIN THE PAST 12 MONTHS, THE FOOD YOU BOUGHT JUST DIDN'T LAST AND YOU DIDN'T HAVE MONEY TO GET MORE.: NEVER TRUE

## 2024-06-05 SDOH — ECONOMIC STABILITY: INCOME INSECURITY: IN THE PAST 12 MONTHS, HAS THE ELECTRIC, GAS, OIL, OR WATER COMPANY THREATENED TO SHUT OFF SERVICE IN YOUR HOME?: NO

## 2024-06-05 SDOH — HEALTH STABILITY: MENTAL HEALTH
HOW OFTEN DO YOU NEED TO HAVE SOMEONE HELP YOU WHEN YOU READ INSTRUCTIONS, PAMPHLETS, OR OTHER WRITTEN MATERIAL FROM YOUR DOCTOR OR PHARMACY?: RARELY

## 2024-06-05 SDOH — SOCIAL STABILITY: SOCIAL INSECURITY
WITHIN THE LAST YEAR, HAVE TO BEEN RAPED OR FORCED TO HAVE ANY KIND OF SEXUAL ACTIVITY BY YOUR PARTNER OR EX-PARTNER?: NO

## 2024-06-05 SDOH — SOCIAL STABILITY: SOCIAL INSECURITY: WITHIN THE LAST YEAR, HAVE YOU BEEN AFRAID OF YOUR PARTNER OR EX-PARTNER?: NO

## 2024-06-05 SDOH — ECONOMIC STABILITY: INCOME INSECURITY: HOW HARD IS IT FOR YOU TO PAY FOR THE VERY BASICS LIKE FOOD, HOUSING, MEDICAL CARE, AND HEATING?: NOT VERY HARD

## 2024-06-05 SDOH — HEALTH STABILITY: PHYSICAL HEALTH: ON AVERAGE, HOW MANY MINUTES DO YOU ENGAGE IN EXERCISE AT THIS LEVEL?: 0 MIN

## 2024-06-05 SDOH — HEALTH STABILITY: PHYSICAL HEALTH: ON AVERAGE, HOW MANY DAYS PER WEEK DO YOU ENGAGE IN MODERATE TO STRENUOUS EXERCISE (LIKE A BRISK WALK)?: 0 DAYS

## 2024-06-05 SDOH — ECONOMIC STABILITY: HOUSING INSECURITY: IN THE LAST 12 MONTHS, HOW MANY PLACES HAVE YOU LIVED?: 2

## 2024-06-05 ASSESSMENT — COGNITIVE AND FUNCTIONAL STATUS - GENERAL
TURNING FROM BACK TO SIDE WHILE IN FLAT BAD: A LITTLE
CLIMB 3 TO 5 STEPS WITH RAILING: TOTAL
CLIMB 3 TO 5 STEPS WITH RAILING: A LOT
MOVING TO AND FROM BED TO CHAIR: A LOT
HELP NEEDED FOR BATHING: TOTAL
TOILETING: A LOT
PERSONAL GROOMING: A LITTLE
STANDING UP FROM CHAIR USING ARMS: TOTAL
DAILY ACTIVITIY SCORE: 10
TOILETING: TOTAL
DRESSING REGULAR LOWER BODY CLOTHING: A LOT
DRESSING REGULAR UPPER BODY CLOTHING: TOTAL
MOBILITY SCORE: 15
MOVING FROM LYING ON BACK TO SITTING ON SIDE OF FLAT BED WITH BEDRAILS: A LITTLE
TURNING FROM BACK TO SIDE WHILE IN FLAT BAD: A LOT
DRESSING REGULAR LOWER BODY CLOTHING: TOTAL
DAILY ACTIVITIY SCORE: 14
PERSONAL GROOMING: A LITTLE
MOVING TO AND FROM BED TO CHAIR: TOTAL
MOBILITY SCORE: 9
EATING MEALS: A LITTLE
HELP NEEDED FOR BATHING: A LOT
WALKING IN HOSPITAL ROOM: TOTAL
STANDING UP FROM CHAIR USING ARMS: A LOT
WALKING IN HOSPITAL ROOM: A LOT
DRESSING REGULAR UPPER BODY CLOTHING: A LOT
EATING MEALS: A LITTLE

## 2024-06-05 ASSESSMENT — ENCOUNTER SYMPTOMS
ROS GI COMMENTS: BOWEL INCONTINENCE
CARDIOVASCULAR NEGATIVE: 1
MUSCULOSKELETAL NEGATIVE: 1
HEMATURIA: 1
WEAKNESS: 1
FEVER: 1
PSYCHIATRIC NEGATIVE: 1
ENDOCRINE NEGATIVE: 1
EYES NEGATIVE: 1
DIFFICULTY URINATING: 1
GASTROINTESTINAL NEGATIVE: 1
RESPIRATORY NEGATIVE: 1

## 2024-06-05 ASSESSMENT — ACTIVITIES OF DAILY LIVING (ADL)
LACK_OF_TRANSPORTATION: NO
HOME_MANAGEMENT_TIME_ENTRY: 13
BATHING_ASSISTANCE: MAXIMAL

## 2024-06-05 ASSESSMENT — PAIN - FUNCTIONAL ASSESSMENT
PAIN_FUNCTIONAL_ASSESSMENT: 0-10

## 2024-06-05 ASSESSMENT — PAIN SCALES - GENERAL
PAINLEVEL_OUTOF10: 0 - NO PAIN

## 2024-06-05 NOTE — PROGRESS NOTES
"Erwin Cline is a 24 y.o. male on day 1 of admission presenting with Severe sepsis ([facility]).    Subjective   Patient was examined at bedside and is very tired. He presented with fever (100.4) overnight and was given motrin/toradol. He is now afebrile. He was also evaluated overnight for elevated heart rate and was visibly short of breath. EKG showed sinus tachycardia. Fluids were discontinued due to concern for volume overload. At this time pt denies chest pain, shortness of breath, abdominal pain. He denies new-onset pain. Pt was trying to sleep and would not allow me to examine his back wound.        Objective     Physical Exam  Vitals and nursing note reviewed.   Constitutional:       Appearance: He is obese.   HENT:      Head: Normocephalic and atraumatic.   Cardiovascular:      Rate and Rhythm: Normal rate and regular rhythm.      Pulses: Normal pulses.      Heart sounds: Normal heart sounds.   Pulmonary:      Effort: Pulmonary effort is normal.      Breath sounds: Normal breath sounds.   Abdominal:      Palpations: Abdomen is soft.   Skin:     General: Skin is warm and dry.   Neurological:      Mental Status: He is alert. Mental status is at baseline.   Psychiatric:         Behavior: Behavior is uncooperative.         Last Recorded Vitals  Blood pressure 112/52, pulse 105, temperature 36.9 °C (98.4 °F), temperature source Temporal, resp. rate 11, height 1.905 m (6' 3\"), weight (!) 199 kg (438 lb 7.9 oz), SpO2 96%.  Intake/Output last 3 Shifts:  I/O last 3 completed shifts:  In: 4725.8 (23.8 mL/kg) [P.O.:480; I.V.:645.8 (3.2 mL/kg); IV Piggyback:3600]  Out: 550 (2.8 mL/kg) [Urine:550 (0.1 mL/kg/hr)]  Weight: 198.9 kg     Relevant Results       This patient currently has cardiac telemetry ordered; if you would like to modify or discontinue the telemetry order, click here to go to the orders activity to modify/discontinue the order.    Scheduled medications  acetaminophen, 975 mg, oral, Once  docusate " sodium, 100 mg, oral, BID  enoxaparin, 40 mg, subcutaneous, Daily  ibuprofen, 800 mg, oral, Once  lidocaine, 5 mL, infiltration, Once  meropenem, 2 g, intravenous, q8h  vancomycin-diluent combo no.1, 2 g, intravenous, q8h      Continuous medications  norepinephrine, 0-0.2 mcg/kg/min, Last Rate: 0.02 mcg/kg/min (06/05/24 0429)  sodium chloride 0.9%, 125 mL/hr, Last Rate: 125 mL/hr (06/05/24 0629)      PRN medications  PRN medications: acetaminophen, methocarbamol, polyethylene glycol, vancomycin        Results for orders placed or performed during the hospital encounter of 06/04/24 (from the past 24 hour(s))   CBC and Auto Differential   Result Value Ref Range    WBC 4.7 4.4 - 11.3 x10*3/uL    nRBC 0.0 0.0 - 0.0 /100 WBCs    RBC 5.17 4.50 - 5.90 x10*6/uL    Hemoglobin 14.2 13.5 - 17.5 g/dL    Hematocrit 44.4 41.0 - 52.0 %    MCV 86 80 - 100 fL    MCH 27.5 26.0 - 34.0 pg    MCHC 32.0 32.0 - 36.0 g/dL    RDW 14.1 11.5 - 14.5 %    Platelets 375 150 - 450 x10*3/uL    Immature Granulocytes %, Automated 0.2 0.0 - 0.9 %    Immature Granulocytes Absolute, Automated 0.01 0.00 - 0.70 x10*3/uL   Comprehensive metabolic panel   Result Value Ref Range    Glucose 98 65 - 99 mg/dL    Sodium 144 133 - 145 mmol/L    Potassium 3.9 3.4 - 5.1 mmol/L    Chloride 106 97 - 107 mmol/L    Bicarbonate 25 24 - 31 mmol/L    Urea Nitrogen 17 8 - 25 mg/dL    Creatinine 0.70 0.40 - 1.60 mg/dL    eGFR >90 >60 mL/min/1.73m*2    Calcium 9.7 8.5 - 10.4 mg/dL    Albumin 4.3 3.5 - 5.0 g/dL    Alkaline Phosphatase 76 35 - 125 U/L    Total Protein 8.0 (H) 5.9 - 7.9 g/dL    AST 61 (H) 5 - 40 U/L    Bilirubin, Total 0.8 0.1 - 1.2 mg/dL     (H) 5 - 40 U/L    Anion Gap 13 <=19 mmol/L   Manual Differential   Result Value Ref Range    Neutrophils %, Manual 49.0 40.0 - 80.0 %    Bands %, Manual 13.0 0.0 - 5.0 %    Lymphocytes %, Manual 26.0 13.0 - 44.0 %    Monocytes %, Manual 3.0 2.0 - 10.0 %    Eosinophils %, Manual 6.0 0.0 - 6.0 %    Basophils %,  Manual 0.0 0.0 - 2.0 %    Atypical Lymphocytes %, Manual 3.0 0.0 - 2.0 %    Seg Neutrophils Absolute, Manual 2.30 1.20 - 7.00 x10*3/uL    Bands Absolute, Manual 0.61 0.00 - 0.70 x10*3/uL    Lymphocytes Absolute, Manual 1.22 1.20 - 4.80 x10*3/uL    Monocytes Absolute, Manual 0.14 0.10 - 1.00 x10*3/uL    Eosinophils Absolute, Manual 0.28 0.00 - 0.70 x10*3/uL    Basophils Absolute, Manual 0.00 0.00 - 0.10 x10*3/uL    Atypical Lymphs Absolute, Manual 0.14 0.00 - 0.50 x10*3/uL    Total Cells Counted 100     Neutrophils Absolute, Manual 2.91 1.20 - 7.70 x10*3/uL    RBC Morphology No significant RBC morphology present    Magnesium   Result Value Ref Range    Magnesium 2.00 1.60 - 3.10 mg/dL   Urinalysis with Reflex Microscopic   Result Value Ref Range    Color, Urine Dark-Brown (N) Light-Yellow, Yellow, Dark-Yellow    Appearance, Urine Ex.Turbid (N) Clear    Specific Gravity, Urine 1.018 1.005 - 1.035    pH, Urine 6.0 5.0, 5.5, 6.0, 6.5, 7.0, 7.5, 8.0    Protein, Urine 100 (2+) (A) NEGATIVE, 10 (TRACE), 20 (TRACE) mg/dL    Glucose, Urine Normal Normal mg/dL    Blood, Urine OVER (3+) (A) NEGATIVE    Ketones, Urine NEGATIVE NEGATIVE mg/dL    Bilirubin, Urine NEGATIVE NEGATIVE    Urobilinogen, Urine Normal Normal mg/dL    Nitrite, Urine NEGATIVE NEGATIVE    Leukocyte Esterase, Urine 500 Jalil/µL (A) NEGATIVE   Microscopic Only, Urine   Result Value Ref Range    WBC, Urine >50 (A) 1-5, NONE /HPF    WBC Clumps, Urine MANY Reference range not established. /HPF    RBC, Urine >20 (A) NONE, 1-2, 3-5 /HPF    Squamous Epithelial Cells, Urine 1-9 (SPARSE) Reference range not established. /HPF    Bacteria, Urine 3+ (A) NONE SEEN /HPF    Mucus, Urine 2+ Reference range not established. /LPF   Blood Culture    Specimen: Peripheral Venipuncture; Blood culture   Result Value Ref Range    Blood Culture       Identification and susceptibility testing to follow    Gram Stain Gram negative bacilli (AA)    Blood Gas Lactic Acid, Venous   Result  Value Ref Range    POCT Lactate, Venous 4.5 (HH) 0.4 - 2.0 mmol/L   Blood Culture    Specimen: Peripheral Venipuncture; Blood culture   Result Value Ref Range    Blood Culture       Identification and susceptibility testing to follow    Gram Stain Gram negative bacilli (AA)    Blood Gas Lactic Acid, Venous   Result Value Ref Range    POCT Lactate, Venous 3.6 (H) 0.4 - 2.0 mmol/L   BLOOD GAS LACTIC ACID, VENOUS   Result Value Ref Range    POCT Lactate, Venous 6.3 (HH) 0.4 - 2.0 mmol/L   Creatine Kinase   Result Value Ref Range    Creatine Kinase 175 24 - 195 U/L   Comprehensive Metabolic Panel   Result Value Ref Range    Glucose 100 (H) 65 - 99 mg/dL    Sodium 136 133 - 145 mmol/L    Potassium 3.8 3.4 - 5.1 mmol/L    Chloride 103 97 - 107 mmol/L    Bicarbonate 20 (L) 24 - 31 mmol/L    Urea Nitrogen 19 8 - 25 mg/dL    Creatinine 1.40 0.40 - 1.60 mg/dL    eGFR 72 >60 mL/min/1.73m*2    Calcium 8.4 (L) 8.5 - 10.4 mg/dL    Albumin 3.3 (L) 3.5 - 5.0 g/dL    Alkaline Phosphatase 67 35 - 125 U/L    Total Protein 5.9 5.9 - 7.9 g/dL    AST 65 (H) 5 - 40 U/L    Bilirubin, Total 0.9 0.1 - 1.2 mg/dL     (H) 5 - 40 U/L    Anion Gap 13 <=19 mmol/L   CBC   Result Value Ref Range    WBC 21.2 (H) 4.4 - 11.3 x10*3/uL    nRBC 0.0 0.0 - 0.0 /100 WBCs    RBC 3.98 (L) 4.50 - 5.90 x10*6/uL    Hemoglobin 11.1 (L) 13.5 - 17.5 g/dL    Hematocrit 34.0 (L) 41.0 - 52.0 %    MCV 85 80 - 100 fL    MCH 27.9 26.0 - 34.0 pg    MCHC 32.6 32.0 - 36.0 g/dL    RDW 14.6 (H) 11.5 - 14.5 %    Platelets 239 150 - 450 x10*3/uL   NT-PROBNP   Result Value Ref Range    PROBNP 451 (H) 0 - 93 pg/mL   Serial Troponin, Initial ([facility])   Result Value Ref Range    Troponin T, High Sensitivity 9 <=14 ng/L   ECG 12 Lead   Result Value Ref Range    Ventricular Rate 130 BPM    Atrial Rate 130 BPM    MT Interval 128 ms    QRS Duration 82 ms    QT Interval 296 ms    QTC Calculation(Bazett) 435 ms    P Axis 72 degrees    R Axis 50 degrees    T Axis 57 degrees    QRS  Count 21 beats    Q Onset 219 ms    P Onset 155 ms    P Offset 207 ms    T Offset 367 ms    QTC Fredericia 383 ms   PST Top   Result Value Ref Range    Extra Tube Hold for add-ons.    CBC and Auto Differential   Result Value Ref Range    WBC 25.4 (H) 4.4 - 11.3 x10*3/uL    nRBC 0.0 0.0 - 0.0 /100 WBCs    RBC 4.28 (L) 4.50 - 5.90 x10*6/uL    Hemoglobin 11.8 (L) 13.5 - 17.5 g/dL    Hematocrit 37.1 (L) 41.0 - 52.0 %    MCV 87 80 - 100 fL    MCH 27.6 26.0 - 34.0 pg    MCHC 31.8 (L) 32.0 - 36.0 g/dL    RDW 14.9 (H) 11.5 - 14.5 %    Platelets 247 150 - 450 x10*3/uL    Neutrophils % 95.2 40.0 - 80.0 %    Immature Granulocytes %, Automated 1.2 (H) 0.0 - 0.9 %    Lymphocytes % 1.1 13.0 - 44.0 %    Monocytes % 2.3 2.0 - 10.0 %    Eosinophils % 0.0 0.0 - 6.0 %    Basophils % 0.2 0.0 - 2.0 %    Neutrophils Absolute 24.23 (H) 1.20 - 7.70 x10*3/uL    Immature Granulocytes Absolute, Automated 0.31 0.00 - 0.70 x10*3/uL    Lymphocytes Absolute 0.27 (L) 1.20 - 4.80 x10*3/uL    Monocytes Absolute 0.58 0.10 - 1.00 x10*3/uL    Eosinophils Absolute 0.00 0.00 - 0.70 x10*3/uL    Basophils Absolute 0.04 0.00 - 0.10 x10*3/uL   Comprehensive Metabolic Panel   Result Value Ref Range    Glucose 103 (H) 65 - 99 mg/dL    Sodium 138 133 - 145 mmol/L    Potassium 4.5 3.4 - 5.1 mmol/L    Chloride 105 97 - 107 mmol/L    Bicarbonate 20 (L) 24 - 31 mmol/L    Urea Nitrogen 23 8 - 25 mg/dL    Creatinine 1.30 0.40 - 1.60 mg/dL    eGFR 79 >60 mL/min/1.73m*2    Calcium 8.1 (L) 8.5 - 10.4 mg/dL    Albumin 3.3 (L) 3.5 - 5.0 g/dL    Alkaline Phosphatase 65 35 - 125 U/L    Total Protein 6.2 5.9 - 7.9 g/dL    AST 56 (H) 5 - 40 U/L    Bilirubin, Total 1.4 (H) 0.1 - 1.2 mg/dL     (H) 5 - 40 U/L    Anion Gap 13 <=19 mmol/L   BLOOD GAS LACTIC ACID, VENOUS   Result Value Ref Range    POCT Lactate, Venous 2.0 0.4 - 2.0 mmol/L   Vancomycin   Result Value Ref Range    Vancomycin 47.1 (H) 10.0 - 20.0 ug/mL   Serial Troponin, 6 Hour ([facility])   Result Value Ref Range     Troponin T, High Sensitivity 9 <=14 ng/L       ECG 12 Lead    Result Date: 6/5/2024  Sinus tachycardia Otherwise normal ECG When compared with ECG of 01-MAY-2024 03:01, No significant change was found    XR chest 1 view    Result Date: 6/5/2024  Interpreted By:  Isaias Wing, STUDY: XR CHEST 1 VIEW;  6/5/2024 3:15 am   INDICATION: Signs/Symptoms:sob.   COMPARISON: 05/01/2024   ACCESSION NUMBER(S): [id number]   ORDERING CLINICIAN: MANJINDER ODONNELL   FINDINGS:     The cardiomediastinal silhouette and pulmonary vasculature are within normal limits. No consolidation, pleural effusion or pneumothorax.         No acute cardiopulmonary process.     MACRO: None.   Signed by: Isaias Wing 6/5/2024 3:35 AM Dictation workstation:   [id number]    CT abdomen pelvis w IV contrast    Result Date: 6/4/2024  Interpreted By:  Julianna Tolliver, STUDY: CT ABDOMEN PELVIS W IV CONTRAST;  6/4/2024 3:03 pm   INDICATION: Signs/Symptoms:abdominal pain, hematuria, bandemia.   COMPARISON: None.   ACCESSION NUMBER(S): [id number]   ORDERING CLINICIAN: VIDHYA WYATT   TECHNIQUE: CT of the abdomen and pelvis was performed.  75 mL Omnipaque 350   FINDINGS: LOWER CHEST: Images of the lung bases show no infiltrate or pleural fluid. There is flame shaped tissue in the retroareolar region of the left breast, likely unilateral gynecomastia.   ABDOMEN:   LIVER: There is no hepatic mass. There is fatty infiltration of the liver.   BILE DUCTS: There is no intrahepatic, common hepatic or common bile ductal dilatation.   GALLBLADDER: The gallbladder is unremarkable.   PANCREAS: The pancreas is unremarkable.   SPLEEN: The spleen is unremarkable. There is no splenic mass or splenomegaly.   ADRENAL GLANDS: The adrenal glands are unremarkable.   KIDNEYS AND URETERS: The kidneys function symmetrically. The kidneys demonstrate no mass. There is no intrarenal calculus or hydronephrosis. There is a Godoy catheter insufflated in the bladder. There  is hyperdense fluid in the bladder. This is consistent with blood. This could be secondary to hemorrhagic cystitis on or iatrogenic from Castaneda catheter placement.   BOWEL: There is no bowel wall thickening, dilatation or obstruction. The appendix is normal.   VESSELS: The abdominal and pelvic vessels are unremarkable.   PERITONEUM/RETROPERITONEUM/LYMPH NODES: There is no retroperitoneal or pelvic adenopathy.  There is no ascites.   ABDOMINAL WALL: The abdominal wall is unremarkable.   BONE AND SOFT TISSUE: There is no acute osseous finding. Status post partial resection spinous process L4   There is no soft tissue abnormality.       1. Castaneda catheter insufflated in the bladder. There is hyperdense material in the bladder lumen. This is either iatrogenic from catheter placement or hemorrhagic cystitis. 2. Fatty infiltration of the liver.   MACRO: None   Signed by: Julianna Tolliver 6/4/2024 3:32 PM Dictation workstation:   [id number]              Assessment/Plan   Active Problems:    Morbid obesity (Multi)    Gross hematuria    SIRS (systemic inflammatory response syndrome) (Multi)  Gram negative sepsis  S/p Lumbar laminectomy for congenital lumbar stenosis with cauda equina syndrome        Gram negative sepsis - overnight pt was tachycardic, hypotensive, febrile, with SOB. Given vancomycin and Rocephin. Given IV LR bolus 1000mL. WBC 25.4 this AM. Lactate decreased from 6.3 --> 2.0 this AM. Patient is now afebrile and normotensive. Blood culture indicates gram negative bacilli.     Gross hematuria - CT of pelvis (6/4) shows castaneda catheter insufflated in bladder with hyperdense material in bladder lumen consistent with blood. Blood culture indicates gram negative bacilli.     S/p Lumbar laminectomy for congenital lumbar stenosis with cauda equina syndrome- unable to visualize wound VAC. Denies new onset-pain.                Fili Tubbs

## 2024-06-05 NOTE — CONSULTS
"Inpatient consult to Psychiatry  Consult performed by: Kaci Rihcmond, MARCELINO-CNP  Consult ordered by: MARCELINO Paz-CNP  Reason for consult: \"Asperger's, behavioral issues\"      PSYCHIATRY CONSULT NOTE    Visit type: Face to face evaluation     HISTORY OF PRESENT ILLNESS:     Erwin Cline is a 24 y.o. male with recent lumbar laminectomy for congenital lumbar stenosis with cauda equina syndrome, who presented to [facility] ED with hematuria.     Prior to meeting with patient, met briefly with patient's mother to discuss consult and her concerns. She reports that patient has had a lifelong history of mental health issues and that he was finally living independently in an apartment with a roommate when his injury occurred.   She states that he was doing well on his own, was working and enjoyed being out on his own.  She does have concerns for his poor motivation and self care practices which she states has been an ongoing problem.  She states that when he lived at home he would not clean his room and would urinate in containers so that he wouldn't have to get up from his computer.  She states that she is willing to help him get back on his feet again with the ultimate goal of his returning to independent living.  Mom denies any issues with outward aggression or combative behaviors.  She states that overall Erwin is \"a good kid\" but she wants more for him.     On interview, pt is lying in bed on his side. He agrees to talk with this writer after introductions and explanation of my role.  Pt explains the reason for his admission to the hospital and is hopeful to return to his previous level of functioning and return to independent living.  He states that he has had a long history with mental health treatment for various diagnoses including, Autism spectrum disorder, ADHD, ODD (as a child), and pervasive developmental disorder.  He states that when he turned 18 he was weaned off of his last " psychiatric medication of seroquel and stopped going to  appointments, stopped taking medications.  He denies feeling sad, low, or depressed.  Denies anxiety symptoms.  He denies difficulty with sleeping too little or too much.  He tends to over eat at meals.  He denies problems with poor concentration, fatigue, feelings of guilt.  He denies A/V hallucinations, paranoia, delusions.  Denies previous SA, thoughts of self harm or thoughts of harm to others.  He does report difficulty with poor motivation and that he has had this problem for most of his life.  He knows what he is supposed to do, however lacks the motivation to actually do it.  We discussed that ADHD can affect the ability to plan and organize as well as lead to lack of motivation.  Recommended that patient re-engage with outpatient  treatment to discuss therapy/medication or both.  Patient verbalizes agreement.      Past Psychiatric History  ADHD  ODD in childhood  Autism spectrum disorder  Pervasive developmental disorder, NOS  No outpatient  treatment since age 18  Inpatient hospitalization at age 10 for aggression, behaviors [facility].    Substance Abuse History  Tobacco use history: Denies  Alcohol use history: Denies  Cannabis use history: Denies  Illicit Drug Use History: Denies    Social History  Household: was living in an apartment prior to hospitalization, will be moving back with parents.   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    ALLERGIES  Penicillins    Surgical History  He has a past surgical history that includes Laminectomy and Lumbar  "laminectomy.    FAMILY HISTORY  Family History   Problem Relation Name Age of Onset    No Known Problems Mother      No Known Problems Father        PSYCHIATRIC REVIEW OF SYSTEMS  Depression:  denies  Anxiety:  denies  Mary: negative  Psychosis: negative  Delirium: negative     OBJECTIVE:     VITALS      6/5/2024     9:44 AM 6/5/2024    11:00 AM 6/5/2024    12:00 PM 6/5/2024     1:00 PM 6/5/2024     2:00 PM 6/5/2024     2:20 PM 6/5/2024     3:00 PM   Vitals   Systolic 111 101 107 101 109  102   Diastolic 98 63 45 62 51  73   Heart Rate 116 110 109 104 104 102 113   Temp  36.3 °C (97.3 °F)    37.6 °C (99.7 °F)    Resp  19 17 23 24 25 19      Body mass index is 54.81 kg/m².  Facility age limit for growth %clarence is 20 years.  Wt Readings from Last 4 Encounters:   06/04/24 (!) 199 kg (438 lb 7.9 oz)   05/02/24 (!) 214 kg (470 lb 15.9 oz)   04/30/24 (!) 214 kg (471 lb)       Mental Status Exam  General: 23 y/o male lying comfortably on his side in his assigned hospital bed during interview.  Appearance: Appeared as age stated; appropriately dressed/groomed.  Attitude: Pleasant and cooperative; guarded but warm.  Behavior: Fair EC; overall responding appropriately  Motor Activity: No notable topher PMAR  Speech: Clear, with fair phonation, and no lisp nor dysarthria.   Mood: \"ok\"  Affect:  constricted, minimal eye contact   Thought Process: Linear and logical; not perseverating   Thought Content: Denied SI/HI. Not voicing/endorsing delusions.  Thought Perception: Did not appear to be responding to internal stimuli. Not endorsing AVH  Cognition: Grossly intact; A&O x4/4 to self, place, date, and context.  Insight: Fair  Judgement: intact    HOME MEDICATIONS  Medication Documentation Review Audit       Reviewed by Christal Obando RN (Registered Nurse) on 06/04/24 at 1525      Medication Order Taking? Sig Documenting Provider Last Dose Status   docusate sodium (Colace) 100 mg capsule [id number] No Take 1 capsule (100 mg) by " mouth 2 times a day. Rigoberto Ng MD Unknown Active   enoxaparin (Lovenox) 40 mg/0.4 mL syringe [id number] No Inject 0.4 mL (40 mg) under the skin 2 times a day. Rigoberto Ng MD Unknown Active   methocarbamol (Robaxin) 500 mg tablet [id number]  Take 1 tablet (500 mg) by mouth 3 times a day as needed for muscle spasms for up to 14 days. Rigoberto Ng MD   24 0944   polyethylene glycol (Glycolax, Miralax) 17 gram packet [id number] No Take 17 g by mouth every 12 hours. Rigoberto Ng MD Unknown Active   Trulicity 0.75 mg/0.5 mL pen injector [id number] Yes Inject 0.75 mg under the skin 1 (one) time per week. Historical Provider, MD 6/3/2024 Active                     CURRENT MEDICATIONS  Scheduled medications  acetaminophen, 975 mg, oral, Once  docusate sodium, 100 mg, oral, BID  enoxaparin, 40 mg, subcutaneous, Daily  lidocaine, 5 mL, infiltration, Once  meropenem, 2 g, intravenous, q8h    Continuous medications   dextrose 5% lactated Ringer's with KCl 20 mEq/L, 75 mL/hr, Last Rate: 75 mL/hr (24 1403)    PRN medications  PRN medications: acetaminophen, methocarbamol, polyethylene glycol     LABS  Admission on 2024   Component Date Value Ref Range Status    WBC 2024 4.7  4.4 - 11.3 x10*3/uL Final    nRBC 2024 0.0  0.0 - 0.0 /100 WBCs Final    RBC 2024 5.17  4.50 - 5.90 x10*6/uL Final    Hemoglobin 2024 14.2  13.5 - 17.5 g/dL Final    Hematocrit 2024 44.4  41.0 - 52.0 % Final    MCV 2024 86  80 - 100 fL Final    MCH 2024 27.5  26.0 - 34.0 pg Final    MCHC 2024 32.0  32.0 - 36.0 g/dL Final    RDW 2024 14.1  11.5 - 14.5 % Final    Platelets 2024 375  150 - 450 x10*3/uL Final    Immature Granulocytes %, Automated 2024 0.2  0.0 - 0.9 % Final    Immature Granulocyte Count (IG) includes promyelocytes, myelocytes and metamyelocytes but does not include bands. Percent differential counts (%) should be  interpreted in the context of the absolute cell counts (cells/UL).    Immature Granulocytes Absolute, Au* 06/04/2024 0.01  0.00 - 0.70 x10*3/uL Final    Glucose 06/04/2024 98  65 - 99 mg/dL Final    Sodium 06/04/2024 144  133 - 145 mmol/L Final    Potassium 06/04/2024 3.9  3.4 - 5.1 mmol/L Final    Chloride 06/04/2024 106  97 - 107 mmol/L Final    Bicarbonate 06/04/2024 25  24 - 31 mmol/L Final    Urea Nitrogen 06/04/2024 17  8 - 25 mg/dL Final    Creatinine 06/04/2024 0.70  0.40 - 1.60 mg/dL Final    eGFR 06/04/2024 >90  >60 mL/min/1.73m*2 Final    Calculations of estimated GFR are performed using the 2021 CKD-EPI Study Refit equation without the race variable for the IDMS-Traceable creatinine methods.  https://jasn.asnjournals.org/content/early/2021/09/22/ASN.2141896287    Calcium 06/04/2024 9.7  8.5 - 10.4 mg/dL Final    Albumin 06/04/2024 4.3  3.5 - 5.0 g/dL Final    Alkaline Phosphatase 06/04/2024 76  35 - 125 U/L Final    Total Protein 06/04/2024 8.0 (H)  5.9 - 7.9 g/dL Final    AST 06/04/2024 61 (H)  5 - 40 U/L Final    Bilirubin, Total 06/04/2024 0.8  0.1 - 1.2 mg/dL Final    ALT 06/04/2024 194 (H)  5 - 40 U/L Final    Anion Gap 06/04/2024 13  <=19 mmol/L Final    Color, Urine 06/04/2024 Dark-Brown (N)  Light-Yellow, Yellow, Dark-Yellow Final    Appearance, Urine 06/04/2024 Ex.Turbid (N)  Clear Final    Specific Gravity, Urine 06/04/2024 1.018  1.005 - 1.035 Final    pH, Urine 06/04/2024 6.0  5.0, 5.5, 6.0, 6.5, 7.0, 7.5, 8.0 Final    Protein, Urine 06/04/2024 100 (2+) (A)  NEGATIVE, 10 (TRACE), 20 (TRACE) mg/dL Final    Glucose, Urine 06/04/2024 Normal  Normal mg/dL Final    Blood, Urine 06/04/2024 OVER (3+) (A)  NEGATIVE Final    Ketones, Urine 06/04/2024 NEGATIVE  NEGATIVE mg/dL Final    Bilirubin, Urine 06/04/2024 NEGATIVE  NEGATIVE Final    Urobilinogen, Urine 06/04/2024 Normal  Normal mg/dL Final    Nitrite, Urine 06/04/2024 NEGATIVE  NEGATIVE Final    Leukocyte Esterase, Urine 06/04/2024 500 Jalil/µL  (A)  NEGATIVE Final    WBC, Urine 06/04/2024 >50 (A)  1-5, NONE /HPF Final    WBC Clumps, Urine 06/04/2024 MANY  Reference range not established. /HPF Final    RBC, Urine 06/04/2024 >20 (A)  NONE, 1-2, 3-5 /HPF Final    Squamous Epithelial Cells, Urine 06/04/2024 1-9 (SPARSE)  Reference range not established. /HPF Final    Bacteria, Urine 06/04/2024 3+ (A)  NONE SEEN /HPF Final    Mucus, Urine 06/04/2024 2+  Reference range not established. /LPF Final    Neutrophils %, Manual 06/04/2024 49.0  40.0 - 80.0 % Final    Percent differential counts (%) should be interpreted in the context of the absolute cell counts (cells/uL).    Bands %, Manual 06/04/2024 13.0  0.0 - 5.0 % Final    Lymphocytes %, Manual 06/04/2024 26.0  13.0 - 44.0 % Final    Monocytes %, Manual 06/04/2024 3.0  2.0 - 10.0 % Final    Eosinophils %, Manual 06/04/2024 6.0  0.0 - 6.0 % Final    Basophils %, Manual 06/04/2024 0.0  0.0 - 2.0 % Final    Atypical Lymphocytes %, Manual 06/04/2024 3.0  0.0 - 2.0 % Final    Seg Neutrophils Absolute, Manual 06/04/2024 2.30  1.20 - 7.00 x10*3/uL Final    Bands Absolute, Manual 06/04/2024 0.61  0.00 - 0.70 x10*3/uL Final    Lymphocytes Absolute, Manual 06/04/2024 1.22  1.20 - 4.80 x10*3/uL Final    Monocytes Absolute, Manual 06/04/2024 0.14  0.10 - 1.00 x10*3/uL Final    Eosinophils Absolute, Manual 06/04/2024 0.28  0.00 - 0.70 x10*3/uL Final    Basophils Absolute, Manual 06/04/2024 0.00  0.00 - 0.10 x10*3/uL Final    Atypical Lymphs Absolute, Manual 06/04/2024 0.14  0.00 - 0.50 x10*3/uL Final    Total Cells Counted 06/04/2024 100   Final    Neutrophils Absolute, Manual 06/04/2024 2.91  1.20 - 7.70 x10*3/uL Final    RBC Morphology 06/04/2024 No significant RBC morphology present   Final    Blood Culture 06/04/2024 Escherichia coli (AA)   Preliminary    BLOOD CULTURE BOTTLE  06/04/2024 Positive Aerobic and Anaerobic Bottle   Preliminary    Gram Stain 06/04/2024 Gram negative bacilli (AA)   Preliminary    Aerobic and  Anaerobic Bottle Positive    POCT Lactate, Venous 06/04/2024 4.5 (HH)  0.4 - 2.0 mmol/L Final    Blood Culture 06/04/2024 Escherichia coli (AA)   Preliminary    BLOOD CULTURE BOTTLE  06/04/2024 Positive Aerobic and Anaerobic Bottle   Preliminary    Gram Stain 06/04/2024 Gram negative bacilli (AA)   Preliminary    Aerobic and Anaerobic Bottle Positive    POCT Lactate, Venous 06/04/2024 3.6 (H)  0.4 - 2.0 mmol/L Final    WBC 06/05/2024 25.4 (H)  4.4 - 11.3 x10*3/uL Final    nRBC 06/05/2024 0.0  0.0 - 0.0 /100 WBCs Final    RBC 06/05/2024 4.28 (L)  4.50 - 5.90 x10*6/uL Final    Hemoglobin 06/05/2024 11.8 (L)  13.5 - 17.5 g/dL Final    Hematocrit 06/05/2024 37.1 (L)  41.0 - 52.0 % Final    MCV 06/05/2024 87  80 - 100 fL Final    MCH 06/05/2024 27.6  26.0 - 34.0 pg Final    MCHC 06/05/2024 31.8 (L)  32.0 - 36.0 g/dL Final    RDW 06/05/2024 14.9 (H)  11.5 - 14.5 % Final    Platelets 06/05/2024 247  150 - 450 x10*3/uL Final    Neutrophils % 06/05/2024 95.2  40.0 - 80.0 % Final    Immature Granulocytes %, Automated 06/05/2024 1.2 (H)  0.0 - 0.9 % Final    Immature Granulocyte Count (IG) includes promyelocytes, myelocytes and metamyelocytes but does not include bands. Percent differential counts (%) should be interpreted in the context of the absolute cell counts (cells/UL).    Lymphocytes % 06/05/2024 1.1  13.0 - 44.0 % Final    Monocytes % 06/05/2024 2.3  2.0 - 10.0 % Final    Eosinophils % 06/05/2024 0.0  0.0 - 6.0 % Final    Basophils % 06/05/2024 0.2  0.0 - 2.0 % Final    Neutrophils Absolute 06/05/2024 24.23 (H)  1.20 - 7.70 x10*3/uL Final    Percent differential counts (%) should be interpreted in the context of the absolute cell counts (cells/uL).    Immature Granulocytes Absolute, Au* 06/05/2024 0.31  0.00 - 0.70 x10*3/uL Final    Lymphocytes Absolute 06/05/2024 0.27 (L)  1.20 - 4.80 x10*3/uL Final    Monocytes Absolute 06/05/2024 0.58  0.10 - 1.00 x10*3/uL Final    Eosinophils Absolute 06/05/2024 0.00  0.00 - 0.70  x10*3/uL Final    Basophils Absolute 06/05/2024 0.04  0.00 - 0.10 x10*3/uL Final    Glucose 06/05/2024 103 (H)  65 - 99 mg/dL Final    Sodium 06/05/2024 138  133 - 145 mmol/L Final    Potassium 06/05/2024 4.5  3.4 - 5.1 mmol/L Final    Chloride 06/05/2024 105  97 - 107 mmol/L Final    Bicarbonate 06/05/2024 20 (L)  24 - 31 mmol/L Final    Urea Nitrogen 06/05/2024 23  8 - 25 mg/dL Final    Creatinine 06/05/2024 1.30  0.40 - 1.60 mg/dL Final    eGFR 06/05/2024 79  >60 mL/min/1.73m*2 Final    Calculations of estimated GFR are performed using the 2021 CKD-EPI Study Refit equation without the race variable for the IDMS-Traceable creatinine methods.  https://jasn.asnjournals.org/content/early/2021/09/22/ASN.0162058622    Calcium 06/05/2024 8.1 (L)  8.5 - 10.4 mg/dL Final    Albumin 06/05/2024 3.3 (L)  3.5 - 5.0 g/dL Final    Alkaline Phosphatase 06/05/2024 65  35 - 125 U/L Final    Total Protein 06/05/2024 6.2  5.9 - 7.9 g/dL Final    AST 06/05/2024 56 (H)  5 - 40 U/L Final    Bilirubin, Total 06/05/2024 1.4 (H)  0.1 - 1.2 mg/dL Final    ALT 06/05/2024 157 (H)  5 - 40 U/L Final    Anion Gap 06/05/2024 13  <=19 mmol/L Final    Magnesium 06/04/2024 2.00  1.60 - 3.10 mg/dL Final    POCT Lactate, Venous 06/05/2024 2.0  0.4 - 2.0 mmol/L Final    POCT Lactate, Venous 06/04/2024 6.3 (HH)  0.4 - 2.0 mmol/L Final    Previous result verified on 6/4/2024 1425 on specimen/case [id number] called with component POCT LACTATE, VENOUS for procedure Blood Gas Lactic Acid, Venous with value 4.5 mmol/L.    Vancomycin 06/05/2024 47.1 (H)  10.0 - 20.0 ug/mL Final    Creatine Kinase 06/04/2024 175  24 - 195 U/L Final    Glucose 06/04/2024 100 (H)  65 - 99 mg/dL Final    Sodium 06/04/2024 136  133 - 145 mmol/L Final    Potassium 06/04/2024 3.8  3.4 - 5.1 mmol/L Final    Chloride 06/04/2024 103  97 - 107 mmol/L Final    Bicarbonate 06/04/2024 20 (L)  24 - 31 mmol/L Final    Urea Nitrogen 06/04/2024 19  8 - 25 mg/dL Final    Creatinine  06/04/2024 1.40  0.40 - 1.60 mg/dL Final    eGFR 06/04/2024 72  >60 mL/min/1.73m*2 Final    Calculations of estimated GFR are performed using the 2021 CKD-EPI Study Refit equation without the race variable for the IDMS-Traceable creatinine methods.  https://jasn.asnjournals.org/content/early/2021/09/22/ASN.1844497741    Calcium 06/04/2024 8.4 (L)  8.5 - 10.4 mg/dL Final    Albumin 06/04/2024 3.3 (L)  3.5 - 5.0 g/dL Final    Alkaline Phosphatase 06/04/2024 67  35 - 125 U/L Final    Total Protein 06/04/2024 5.9  5.9 - 7.9 g/dL Final    AST 06/04/2024 65 (H)  5 - 40 U/L Final    Bilirubin, Total 06/04/2024 0.9  0.1 - 1.2 mg/dL Final    ALT 06/04/2024 166 (H)  5 - 40 U/L Final    Anion Gap 06/04/2024 13  <=19 mmol/L Final    WBC 06/04/2024 21.2 (H)  4.4 - 11.3 x10*3/uL Final    nRBC 06/04/2024 0.0  0.0 - 0.0 /100 WBCs Final    RBC 06/04/2024 3.98 (L)  4.50 - 5.90 x10*6/uL Final    Hemoglobin 06/04/2024 11.1 (L)  13.5 - 17.5 g/dL Final    Hematocrit 06/04/2024 34.0 (L)  41.0 - 52.0 % Final    MCV 06/04/2024 85  80 - 100 fL Final    MCH 06/04/2024 27.9  26.0 - 34.0 pg Final    MCHC 06/04/2024 32.6  32.0 - 36.0 g/dL Final    RDW 06/04/2024 14.6 (H)  11.5 - 14.5 % Final    Platelets 06/04/2024 239  150 - 450 x10*3/uL Final    PROBNP 06/04/2024 451 (H)  0 - 93 pg/mL Final    Ventricular Rate 06/04/2024 130  BPM Final    Atrial Rate 06/04/2024 130  BPM Final    MI Interval 06/04/2024 128  ms Final    QRS Duration 06/04/2024 82  ms Final    QT Interval 06/04/2024 296  ms Final    QTC Calculation(Bazett) 06/04/2024 435  ms Final    P Axis 06/04/2024 72  degrees Final    R Axis 06/04/2024 50  degrees Final    T Axis 06/04/2024 57  degrees Final    QRS Count 06/04/2024 21  beats Final    Q Onset 06/04/2024 219  ms Final    P Onset 06/04/2024 155  ms Final    P Offset 06/04/2024 207  ms Final    T Offset 06/04/2024 367  ms Final    QTC Fredericia 06/04/2024 383  ms Final    Troponin T, High Sensitivity 06/04/2024 9  <=14 ng/L  Final    Extra Tube 06/05/2024 Hold for add-ons.   Final    Auto resulted.    Troponin T, High Sensitivity 06/05/2024 9  <=14 ng/L Final    BSA 06/05/2024 3.24  m2 In process     IMAGING  ECG 12 Lead    Result Date: 6/5/2024  Sinus tachycardia Otherwise normal ECG When compared with ECG of 01-MAY-2024 03:01, No significant change was found Confirmed by Earl Romano ([id number]) on 6/5/2024 10:59:53 AM    XR chest 1 view    Result Date: 6/5/2024  Interpreted By:  Isaias Wing, STUDY: XR CHEST 1 VIEW;  6/5/2024 3:15 am   INDICATION: Signs/Symptoms:sob.   COMPARISON: 05/01/2024   ACCESSION NUMBER(S): [id number]   ORDERING CLINICIAN: MANJINDER ODONNELL   FINDINGS:     The cardiomediastinal silhouette and pulmonary vasculature are within normal limits. No consolidation, pleural effusion or pneumothorax.         No acute cardiopulmonary process.     MACRO: None.   Signed by: Isaias Wing 6/5/2024 3:35 AM Dictation workstation:   [id number]    CT abdomen pelvis w IV contrast    Result Date: 6/4/2024  Interpreted By:  Julianna Tolliver, STUDY: CT ABDOMEN PELVIS W IV CONTRAST;  6/4/2024 3:03 pm   INDICATION: Signs/Symptoms:abdominal pain, hematuria, bandemia.   COMPARISON: None.   ACCESSION NUMBER(S): [id number]   ORDERING CLINICIAN: VIDHYA WYATT   TECHNIQUE: CT of the abdomen and pelvis was performed.  75 mL Omnipaque 350   FINDINGS: LOWER CHEST: Images of the lung bases show no infiltrate or pleural fluid. There is flame shaped tissue in the retroareolar region of the left breast, likely unilateral gynecomastia.   ABDOMEN:   LIVER: There is no hepatic mass. There is fatty infiltration of the liver.   BILE DUCTS: There is no intrahepatic, common hepatic or common bile ductal dilatation.   GALLBLADDER: The gallbladder is unremarkable.   PANCREAS: The pancreas is unremarkable.   SPLEEN: The spleen is unremarkable. There is no splenic mass or splenomegaly.   ADRENAL GLANDS: The adrenal glands are unremarkable.    KIDNEYS AND URETERS: The kidneys function symmetrically. The kidneys demonstrate no mass. There is no intrarenal calculus or hydronephrosis. There is a Godoy catheter insufflated in the bladder. There is hyperdense fluid in the bladder. This is consistent with blood. This could be secondary to hemorrhagic cystitis on or iatrogenic from Godoy catheter placement.   BOWEL: There is no bowel wall thickening, dilatation or obstruction. The appendix is normal.   VESSELS: The abdominal and pelvic vessels are unremarkable.   PERITONEUM/RETROPERITONEUM/LYMPH NODES: There is no retroperitoneal or pelvic adenopathy.  There is no ascites.   ABDOMINAL WALL: The abdominal wall is unremarkable.   BONE AND SOFT TISSUE: There is no acute osseous finding. Status post partial resection spinous process L4   There is no soft tissue abnormality.       1. Godoy catheter insufflated in the bladder. There is hyperdense material in the bladder lumen. This is either iatrogenic from catheter placement or hemorrhagic cystitis. 2. Fatty infiltration of the liver.   MACRO: None   Signed by: Julianna Tolliver 6/4/2024 3:32 PM Dictation workstation:   [id number]       ASSESSMENT:     PSYCHIATRIC RISK ASSESSMENT  Violence Risk Factors:  male and stress/destabilizers  Acute Risk of Harm to Others is Considered: Low  Suicide Risk Factors: male and ; /[place]  Protective Factors: positive family relationships and hopefulness/future-orientation  Acute Risk of Harm to Self is Considered: Low    DIAGNOSIS  Principal Problem:    E coli bacteremia  Active Problems:    Morbid obesity (Multi)    Septic shock (Multi)    Gross hematuria    Gram negative sepsis (Multi)  ADHD  Autism spectrum disorder by history    PLAN/ RECOMMENDATIONS:     Recs:  -No medications indicated at this time as patient does not report symptoms of depression or anxiety.  We discussed follow up with  provider to further explore ADHD as a contributing  factor to his poor motivation.  He is agreeable to receive resources.     SAFETY  - Patient does not currently meet criteria for inpatient psychiatric admission.     -Thank you for allowing us to participate in the care of this patient.  Psychiatry will continue to follow and provide resources.     I personally spent 81 minutes today providing care for this patient, including preparation, face to face time, documentation and other services such as review of medical records, diagnostic result, patient education, counseling, coordination of care as specified in the encounter.     l

## 2024-06-05 NOTE — CONSULTS
Inpatient consult to Urology  Consult performed by: Sultana Marion MD  Consult ordered by: Mati Geller DO  Reason for consult: urinary retention  Assessment/Recommendations: Agree w lumbar imaging, known hx cauda equina may be contributory      Admitted from snf, s/p lumbar laminectomy, has castaneda in place due to retention and c/o numbness of urination and stooling.  By report was pulling at cath and developed hematuria    Active Ambulatory Problems     Diagnosis Date Noted    Acute midline low back pain, unspecified whether sciatica present 05/01/2024    Herniated lumbar disc without myelopathy 04/30/2024    Morbid obesity (Multi) 05/02/2024    SIRS (systemic inflammatory response syndrome) (Multi) 06/04/2024     Resolved Ambulatory Problems     Diagnosis Date Noted    No Resolved Ambulatory Problems     Past Medical History:   Diagnosis Date    ADHD (attention deficit hyperactivity disorder)     Asperger's syndrome ([facility])     OCD (obsessive compulsive disorder)         Past Surgical History:   Procedure Laterality Date    LAMINECTOMY      LUMBAR LAMINECTOMY          No current facility-administered medications on file prior to encounter.     Current Outpatient Medications on File Prior to Encounter   Medication Sig Dispense Refill    Trulicity 0.75 mg/0.5 mL pen injector Inject 0.75 mg under the skin 1 (one) time per week.      docusate sodium (Colace) 100 mg capsule Take 1 capsule (100 mg) by mouth 2 times a day. 60 capsule 0    enoxaparin (Lovenox) 40 mg/0.4 mL syringe Inject 0.4 mL (40 mg) under the skin 2 times a day. 60 each 0    methocarbamol (Robaxin) 500 mg tablet Take 1 tablet (500 mg) by mouth 3 times a day as needed for muscle spasms for up to 14 days. 42 tablet 0    polyethylene glycol (Glycolax, Miralax) 17 gram packet Take 17 g by mouth every 12 hours. 60 packet 0        Allergies   Allergen Reactions    Penicillins Rash      I/O last 3 completed shifts:  In: 4725.8 (23.8 mL/kg)  [P.O.:480; I.V.:645.8 (3.2 mL/kg); IV Piggyback:3600]  Out: 550 (2.8 mL/kg) [Urine:550 (0.1 mL/kg/hr)]  Weight: 198.9 kg   I/O this shift:  In: 0   Out: 350 [Urine:350]     Visit Vitals  /62   Pulse 104   Temp 36.3 °C (97.3 °F) (Temporal)   Resp 23      PE:  sleeping when entered   No rash nor jaundice  No sob nor wheezes   Abd obese  Godoy in place and draining dark yellow urine easily    Results for orders placed or performed during the hospital encounter of 06/04/24 (from the past 24 hour(s))   Blood Gas Lactic Acid, Venous   Result Value Ref Range    POCT Lactate, Venous 3.6 (H) 0.4 - 2.0 mmol/L   BLOOD GAS LACTIC ACID, VENOUS   Result Value Ref Range    POCT Lactate, Venous 6.3 (HH) 0.4 - 2.0 mmol/L   Creatine Kinase   Result Value Ref Range    Creatine Kinase 175 24 - 195 U/L   Comprehensive Metabolic Panel   Result Value Ref Range    Glucose 100 (H) 65 - 99 mg/dL    Sodium 136 133 - 145 mmol/L    Potassium 3.8 3.4 - 5.1 mmol/L    Chloride 103 97 - 107 mmol/L    Bicarbonate 20 (L) 24 - 31 mmol/L    Urea Nitrogen 19 8 - 25 mg/dL    Creatinine 1.40 0.40 - 1.60 mg/dL    eGFR 72 >60 mL/min/1.73m*2    Calcium 8.4 (L) 8.5 - 10.4 mg/dL    Albumin 3.3 (L) 3.5 - 5.0 g/dL    Alkaline Phosphatase 67 35 - 125 U/L    Total Protein 5.9 5.9 - 7.9 g/dL    AST 65 (H) 5 - 40 U/L    Bilirubin, Total 0.9 0.1 - 1.2 mg/dL     (H) 5 - 40 U/L    Anion Gap 13 <=19 mmol/L   CBC   Result Value Ref Range    WBC 21.2 (H) 4.4 - 11.3 x10*3/uL    nRBC 0.0 0.0 - 0.0 /100 WBCs    RBC 3.98 (L) 4.50 - 5.90 x10*6/uL    Hemoglobin 11.1 (L) 13.5 - 17.5 g/dL    Hematocrit 34.0 (L) 41.0 - 52.0 %    MCV 85 80 - 100 fL    MCH 27.9 26.0 - 34.0 pg    MCHC 32.6 32.0 - 36.0 g/dL    RDW 14.6 (H) 11.5 - 14.5 %    Platelets 239 150 - 450 x10*3/uL   NT-PROBNP   Result Value Ref Range    PROBNP 451 (H) 0 - 93 pg/mL   Serial Troponin, Initial ([facility])   Result Value Ref Range    Troponin T, High Sensitivity 9 <=14 ng/L   ECG 12 Lead   Result Value Ref  Range    Ventricular Rate 130 BPM    Atrial Rate 130 BPM    ND Interval 128 ms    QRS Duration 82 ms    QT Interval 296 ms    QTC Calculation(Bazett) 435 ms    P Axis 72 degrees    R Axis 50 degrees    T Axis 57 degrees    QRS Count 21 beats    Q Onset 219 ms    P Onset 155 ms    P Offset 207 ms    T Offset 367 ms    QTC Fredericia 383 ms   PST Top   Result Value Ref Range    Extra Tube Hold for add-ons.    CBC and Auto Differential   Result Value Ref Range    WBC 25.4 (H) 4.4 - 11.3 x10*3/uL    nRBC 0.0 0.0 - 0.0 /100 WBCs    RBC 4.28 (L) 4.50 - 5.90 x10*6/uL    Hemoglobin 11.8 (L) 13.5 - 17.5 g/dL    Hematocrit 37.1 (L) 41.0 - 52.0 %    MCV 87 80 - 100 fL    MCH 27.6 26.0 - 34.0 pg    MCHC 31.8 (L) 32.0 - 36.0 g/dL    RDW 14.9 (H) 11.5 - 14.5 %    Platelets 247 150 - 450 x10*3/uL    Neutrophils % 95.2 40.0 - 80.0 %    Immature Granulocytes %, Automated 1.2 (H) 0.0 - 0.9 %    Lymphocytes % 1.1 13.0 - 44.0 %    Monocytes % 2.3 2.0 - 10.0 %    Eosinophils % 0.0 0.0 - 6.0 %    Basophils % 0.2 0.0 - 2.0 %    Neutrophils Absolute 24.23 (H) 1.20 - 7.70 x10*3/uL    Immature Granulocytes Absolute, Automated 0.31 0.00 - 0.70 x10*3/uL    Lymphocytes Absolute 0.27 (L) 1.20 - 4.80 x10*3/uL    Monocytes Absolute 0.58 0.10 - 1.00 x10*3/uL    Eosinophils Absolute 0.00 0.00 - 0.70 x10*3/uL    Basophils Absolute 0.04 0.00 - 0.10 x10*3/uL   Comprehensive Metabolic Panel   Result Value Ref Range    Glucose 103 (H) 65 - 99 mg/dL    Sodium 138 133 - 145 mmol/L    Potassium 4.5 3.4 - 5.1 mmol/L    Chloride 105 97 - 107 mmol/L    Bicarbonate 20 (L) 24 - 31 mmol/L    Urea Nitrogen 23 8 - 25 mg/dL    Creatinine 1.30 0.40 - 1.60 mg/dL    eGFR 79 >60 mL/min/1.73m*2    Calcium 8.1 (L) 8.5 - 10.4 mg/dL    Albumin 3.3 (L) 3.5 - 5.0 g/dL    Alkaline Phosphatase 65 35 - 125 U/L    Total Protein 6.2 5.9 - 7.9 g/dL    AST 56 (H) 5 - 40 U/L    Bilirubin, Total 1.4 (H) 0.1 - 1.2 mg/dL     (H) 5 - 40 U/L    Anion Gap 13 <=19 mmol/L   BLOOD GAS  LACTIC ACID, VENOUS   Result Value Ref Range    POCT Lactate, Venous 2.0 0.4 - 2.0 mmol/L   Vancomycin   Result Value Ref Range    Vancomycin 47.1 (H) 10.0 - 20.0 ug/mL   Serial Troponin, 6 Hour ([facility])   Result Value Ref Range    Troponin T, High Sensitivity 9 <=14 ng/L        ECG 12 Lead    Result Date: 6/5/2024  Sinus tachycardia Otherwise normal ECG When compared with ECG of 01-MAY-2024 03:01, No significant change was found Confirmed by Earl Romano ([id number]) on 6/5/2024 10:59:53 AM    XR chest 1 view    Result Date: 6/5/2024  Interpreted By:  Isaias Wing, STUDY: XR CHEST 1 VIEW;  6/5/2024 3:15 am   INDICATION: Signs/Symptoms:sob.   COMPARISON: 05/01/2024   ACCESSION NUMBER(S): [id number]   ORDERING CLINICIAN: MANJINDER ODONNELL   FINDINGS:     The cardiomediastinal silhouette and pulmonary vasculature are within normal limits. No consolidation, pleural effusion or pneumothorax.         No acute cardiopulmonary process.     MACRO: None.   Signed by: Isaias Wing 6/5/2024 3:35 AM Dictation workstation:   [id number]    CT abdomen pelvis w IV contrast    Result Date: 6/4/2024  Interpreted By:  Julianna Tolliver, STUDY: CT ABDOMEN PELVIS W IV CONTRAST;  6/4/2024 3:03 pm   INDICATION: Signs/Symptoms:abdominal pain, hematuria, bandemia.   COMPARISON: None.   ACCESSION NUMBER(S): [id number]   ORDERING CLINICIAN: VIDHYA WYATT   TECHNIQUE: CT of the abdomen and pelvis was performed.  75 mL Omnipaque 350   FINDINGS: LOWER CHEST: Images of the lung bases show no infiltrate or pleural fluid. There is flame shaped tissue in the retroareolar region of the left breast, likely unilateral gynecomastia.   ABDOMEN:   LIVER: There is no hepatic mass. There is fatty infiltration of the liver.   BILE DUCTS: There is no intrahepatic, common hepatic or common bile ductal dilatation.   GALLBLADDER: The gallbladder is unremarkable.   PANCREAS: The pancreas is unremarkable.   SPLEEN: The spleen is unremarkable. There is no  splenic mass or splenomegaly.   ADRENAL GLANDS: The adrenal glands are unremarkable.   KIDNEYS AND URETERS: The kidneys function symmetrically. The kidneys demonstrate no mass. There is no intrarenal calculus or hydronephrosis. There is a Castaneda catheter insufflated in the bladder. There is hyperdense fluid in the bladder. This is consistent with blood. This could be secondary to hemorrhagic cystitis on or iatrogenic from Castaneda catheter placement.   BOWEL: There is no bowel wall thickening, dilatation or obstruction. The appendix is normal.   VESSELS: The abdominal and pelvic vessels are unremarkable.   PERITONEUM/RETROPERITONEUM/LYMPH NODES: There is no retroperitoneal or pelvic adenopathy.  There is no ascites.   ABDOMINAL WALL: The abdominal wall is unremarkable.   BONE AND SOFT TISSUE: There is no acute osseous finding. Status post partial resection spinous process L4   There is no soft tissue abnormality.       1. Castaneda catheter insufflated in the bladder. There is hyperdense material in the bladder lumen. This is either iatrogenic from catheter placement or hemorrhagic cystitis. 2. Fatty infiltration of the liver.   MACRO: None   Signed by: Julianna Tolliver 6/4/2024 3:32 PM Dictation workstation:   [id number]      A/P:  Pt w chronic retention, exacerbated by recent events.  Leave castaneda in place until full neuro assessment complete, will follow w primary

## 2024-06-05 NOTE — PROGRESS NOTES
Vancomycin Dosing by Pharmacy- Cessation of Therapy    Consult to pharmacy for vancomycin dosing has been discontinued by the prescriber, pharmacy will sign off at this time.    Please call pharmacy if there are further questions or re-enter a consult if vancomycin is resumed.     Bo Justin, PharmD

## 2024-06-05 NOTE — PROGRESS NOTES
Physical Therapy    Physical Therapy Evaluation & Treatment    Patient Name: Erwin Cline  MRN: [id number]  Today's Date: 6/5/2024   Time Calculation  Start Time: 0944  Stop Time: 1010  Time Calculation (min): 26 min    Assessment/Plan   PT Assessment  PT Assessment Results: Decreased strength, Decreased range of motion, Decreased endurance, Impaired balance, Decreased safety awareness, Impaired sensation, Impaired tone, Obesity, Decreased mobility  Rehab Prognosis: Good  Barriers to Discharge: stair entry into home, morbid obesity, limited gait distance with RW, limited mobility  Evaluation/Treatment Tolerance: Patient limited by fatigue  Medical Staff Made Aware: Yes (RN present in room during evaluation)  Strengths: Support of Caregivers  Barriers to Participation: Comorbidities  End of Session Communication: Bedside nurse  Assessment Comment: 24 year old male who presents with B LE weakness, impaired activity tolerance, impaired balance requiring assist x 2. Patient has suffered a decline in function and requires acute PT to address functional deficits.  End of Session Patient Position: Up in chair, Alarm off, caregiver present (RN agreeable to alarm off)   IP OR SWING BED PT PLAN  Inpatient or Swing Bed: Inpatient  PT Plan  Treatment/Interventions: Bed mobility, Gait training, Transfer training, Balance training, Strengthening, Endurance training, Therapeutic exercise, Therapeutic activity, Home exercise program  PT Plan: Skilled PT  PT Frequency: 4 times per week  PT Discharge Recommendations: Moderate intensity level of continued care  Equipment Recommended upon Discharge: Wheeled walker  PT Recommended Transfer Status: Assist x2, Assistive device      Subjective     General Visit Information:  General  Reason for Referral: impaired mobility, UTI, SIRS  Referred By: Dr. Geller  Past Medical History Relevant to Rehab: ADHD, asperger's, OCD, recent lumbar lami d/t cauda equina  "syndrome  Family/Caregiver Present: No  Co-Treatment: OT  Co-Treatment Reason: partial co treat for safe mobility  Prior to Session Communication: Bedside nurse  Patient Position Received: Bed, 4 rail up  Preferred Learning Style: verbal, visual  General Comment: Cleared by nsg pt met in supine, agreeable to PT session  Home Living:  Home Living  Type of Home: House  Lives With: Parent(s)  Home Adaptive Equipment: Walker rolling or standard  Home Layout: One level  Home Access:  (1 step from garage into house to get to bathroom)  Entrance Stairs-Number of Steps: 5  Bathroom Shower/Tub: Walk-in shower  Bathroom Toilet: Standard  Bathroom Equipment: Grab bars in shower  Home Living Comments: has been home from SNF for ~1 week, living with parents, has set up in garage.  Prior Level of Function:  Prior Function Per Pt/Caregiver Report  Level of Ralph: Needs assistance with ADLs, Needs assistance with homemaking, Needs assistance with functional transfers  Receives Help From: Parent(s)  ADL Assistance:  (sponge bathing, requires assist for LB dressing, parents report he is incontinent \"due to laziness\")  Homemaking Assistance: Needs assistance (parents manage)  Ambulatory Assistance: Needs assistance (with RW, short distances only)  Prior Function Comments: patient has been sponge bathing since return home from rehab  Precautions:  Precautions  Medical Precautions: Fall precautions, Spinal precautions  Post-Surgical Precautions: Spinal precautions  Precautions Comment: recent spinal sx, per patient at beginning of May  Vital Signs:  Vital Signs  Heart Rate: (!) 116  Heart Rate Source: Monitor  SpO2: 95 %  BP: (!) 111/98 (104/45 (61) in supine, 118/81 (91) sitting EOB, 111/98 (105) sitting in recliner)  MAP (mmHg): 105  BP Location: Left arm  BP Method: Automatic  Patient Position: Sitting    Objective   Pain:  Pain Assessment  Pain Assessment: 0-10  Pain Score: 0 - No pain  Cognition:  Cognition  Overall " Cognitive Status: Impaired  Cognition Comments: lethargic, flat affect but calm and cooperative.    General Assessments:                  Activity Tolerance  Endurance: Decreased tolerance for upright activites  Activity Tolerance Comments: d/t prolonged immobility, weakness and decline in function    Sensation  Sensation Comment: + saddle paresthesia reported    Strength  Strength Comments: B LE impaired for strength and ROM  Coordination  Movements are Fluid and Coordinated: No  Heel to Shin: Impaired    Postural Control  Postural Control: Within Functional Limits    Static Sitting Balance  Static Sitting-Balance Support: Feet supported  Static Sitting-Level of Assistance: Close supervision  Dynamic Sitting Balance  Dynamic Sitting-Balance Support: Feet supported, Bilateral upper extremity supported  Dynamic Sitting-Comments: min A    Static Standing Balance  Static Standing-Balance Support: Bilateral upper extremity supported  Static Standing-Level of Assistance: Moderate assistance (x2)  Static Standing-Comment/Number of Minutes: mod A x 2 with B UE support on RW  Dynamic Standing Balance  Dynamic Standing-Balance Support: Bilateral upper extremity supported  Dynamic Standing-Comments: mod A x 2 with B UE support on RW  Functional Assessments:  Bed Mobility 1  Bed Mobility 1: Supine to sitting  Level of Assistance 1: Moderate assistance, +2  Bed Mobility Comments 1: for trunk up, cues for logroll  technique for safety    Transfer 1  Transfer From 1: Bed to  Transfer to 1: Stand  Technique 1: Stand to sit, Sit to stand  Transfer Device 1: Walker  Transfer Level of Assistance 1: Moderate assistance, +2  Trials/Comments 1: STS from EOB with heavy duty rolling walker. 3 standing attempts during session  Transfers 2  Transfer From 2: Bed to  Transfer to 2: Chair with arms  Technique 2: Stand pivot  Transfer Device 2: Walker  Transfer Level of Assistance 2: Moderate assistance, +2  Trials/Comments 2: assist required  for walker mgmt, body positioning, cued for hand placement and task sequencing, fair eccentric control during descent    Ambulation/Gait Training 1  Comments/Distance (ft) 1: 3 steps from bed to recliner chair with HDRW mod A x 2, B knees flexed, cues for upright posture, decreased B foot clearance  Extremity/Trunk Assessments:     RLE   RLE : Exceptions to WFL (hip flex 2+/5, knee ext 3-/5, ankle dorsiflexion 2/5. Limited L knee extension noted)  LLE   LLE : Exceptions to WFL (hip flex 2+/5 knee extension 2+.5, ankle dorsiflexion 2-/5, limited L knee extension noted)  Treatments:  sit to stand transfers x 3 with standing tolerance activity for 30-45 sec mod A x 2, patient is forward flexed, cues for upright posture. Fatigues quickly and requests to sit, encouragement to maintain upright posture.     Outcome Measures:  [facility] Basic Mobility  Turning from your back to your side while in a flat bed without using bedrails: A little  Moving from lying on your back to sitting on the side of a flat bed without using bedrails: A lot  Moving to and from bed to chair (including a wheelchair): Total  Standing up from a chair using your arms (e.g. wheelchair or bedside chair): Total  To walk in hospital room: Total  Climbing 3-5 steps with railing: Total  Basic Mobility - Total Score: 9    Encounter Problems       Encounter Problems (Active)       PT goals       Patient will transfer supine to sit and sit to supine with supervision assist to facilitate mobility.        Start:  06/05/24    Expected End:  06/19/24            Patient will transfer bed to chair and chair to bed with supervision assist to facilitate mobility.        Start:  06/05/24    Expected End:  06/19/24            Patient will amb 25 feet with rolling walker device including no turns on even surface with supervision assist to facilitate safe mobility.        Start:  06/05/24    Expected End:  06/19/24            Patient will increase B LE strength to 3/5 to  improve functional mobility.          Start:  06/05/24    Expected End:  06/19/24               Pain - Adult              Education Documentation  Mobility Training, taught by Cassi Quispe PT at 6/5/2024 11:47 AM.  Learner: Patient  Readiness: Acceptance  Method: Explanation  Response: Verbalizes Understanding, Needs Reinforcement  Comment: Education provided re: PT purpose, safe mobility,    Education Comments  No comments found.

## 2024-06-05 NOTE — CONSULTS
"Inpatient consult to Infectious Diseases  Consult performed by: JOAN Bermudez  Consult ordered by: Chester Knutson MD  Reason for consult: sepsis, recent lumbar surgery          Primary MD: JOAN Alvarado        History Of Present Illness  Erwin Cline is a 24 y.o. male with past medical history of lower back pain with saddle anesthesia and urinary incontinence he underwent lumbar laminectomy decompression and instrumented fusion on 5/2/2024 at [facility].  He had follow-up with orthopedic surgery on 5/23/2024 sutures removed.  Was recommended that he follow-up with urology.  He presented to the emergency room with hematuria.  He Reports some saddle anesthesia, he is unaware if he has to urinate or defecate.  He reports he had these symptoms prior to surgery.  He reports some improvement in symptoms and increased sensation.  He reports intermittent bilateral sharp shooting \"electrical shock type\" pains in bilateral legs.   He reports sometimes he can feel abdominal discomfort when his bladder is full and can bear down to force urination, he does not feel bladder empties completely. Godoy catheter was placed.  He reports started having hematuria yesterday morning.  He reports fever, max temp recorded is 39.5 °C.  He was tachycardic, hypotensive.  He is on room air with normal oxygenation.  He denies shortness of breath cough or chest pain.  He denies nausea vomiting or diarrhea.  Labs with leukocytosis.  Urinalysis with pyuria.  Positive Blood culture with E. coli.  Unremarkable chest x-ray.  He has allergy to penicillin-rash.  He is on IV meropenem, vancomycin.    Past Medical History  He has a past medical history of ADHD (attention deficit hyperactivity disorder), Asperger's syndrome ([facility]), and OCD (obsessive compulsive disorder).    Surgical History  He has a past surgical history that includes Laminectomy and Lumbar laminectomy.     Social History     Occupational " History    Not on file   Tobacco Use    Smoking status: Never     Passive exposure: Never    Smokeless tobacco: Never   Vaping Use    Vaping status: Never Used   Substance and Sexual Activity    Alcohol use: Never    Drug use: Never    Sexual activity: Not on file     Travel History   Travel since 05/05/24    No documented travel since 05/05/24            Family History  Family History   Problem Relation Name Age of Onset    No Known Problems Mother      No Known Problems Father       Allergies  Penicillins       There is no immunization history on file for this patient.  Medications  Home medications:  Medications Prior to Admission   Medication Sig Dispense Refill Last Dose    Trulicity 0.75 mg/0.5 mL pen injector Inject 0.75 mg under the skin 1 (one) time per week.   6/3/2024    docusate sodium (Colace) 100 mg capsule Take 1 capsule (100 mg) by mouth 2 times a day. 60 capsule 0 Unknown    enoxaparin (Lovenox) 40 mg/0.4 mL syringe Inject 0.4 mL (40 mg) under the skin 2 times a day. 60 each 0 Unknown    methocarbamol (Robaxin) 500 mg tablet Take 1 tablet (500 mg) by mouth 3 times a day as needed for muscle spasms for up to 14 days. 42 tablet 0     polyethylene glycol (Glycolax, Miralax) 17 gram packet Take 17 g by mouth every 12 hours. 60 packet 0 Unknown     Current medications:  Scheduled medications  acetaminophen, 975 mg, oral, Once  docusate sodium, 100 mg, oral, BID  enoxaparin, 40 mg, subcutaneous, Daily  ibuprofen, 800 mg, oral, Once  lidocaine, 5 mL, infiltration, Once  meropenem, 2 g, intravenous, q8h  vancomycin-diluent combo no.1, 2 g, intravenous, q8h      Continuous medications  norepinephrine, 0-0.2 mcg/kg/min, Last Rate: Stopped (06/05/24 1000)  sodium chloride 0.9%, 125 mL/hr, Last Rate: 125 mL/hr (06/05/24 0629)      PRN medications  PRN medications: acetaminophen, methocarbamol, polyethylene glycol, vancomycin    Review of Systems   Constitutional:  Positive for fever.   HENT: Negative.     Eyes:  "Negative.    Respiratory: Negative.     Cardiovascular: Negative.    Gastrointestinal: Negative.         Bowel incontinence   Endocrine: Negative.    Genitourinary:  Positive for difficulty urinating and hematuria.   Musculoskeletal: Negative.    Skin: Negative.    Neurological:  Positive for weakness.   Psychiatric/Behavioral: Negative.          Objective  Range of Vitals (last 24 hours)  Heart Rate:  [100-150]   Temp:  [36.3 °C (97.3 °F)-39.5 °C (103.1 °F)]   Resp:  [11-35]   BP: ()/()   Height:  [190.5 cm (6' 3\")]   Weight:  [199 kg (438 lb 7.9 oz)-221 kg (487 lb)]   SpO2:  [91 %-98 %]   Daily Weight  06/04/24 : (!) 199 kg (438 lb 7.9 oz)    Body mass index is 54.81 kg/m².     Physical Exam  Constitutional:       Appearance: Normal appearance.   HENT:      Head: Normocephalic and atraumatic.      Nose: Nose normal.      Mouth/Throat:      Mouth: Mucous membranes are moist.      Pharynx: Oropharynx is clear.   Eyes:      Conjunctiva/sclera: Conjunctivae normal.   Cardiovascular:      Rate and Rhythm: Regular rhythm. Tachycardia present.   Pulmonary:      Effort: Pulmonary effort is normal.      Breath sounds: Normal breath sounds.   Abdominal:      General: Bowel sounds are normal.      Palpations: Abdomen is soft.   Musculoskeletal:         General: Normal range of motion.      Cervical back: Normal range of motion and neck supple.   Skin:     General: Skin is warm and dry.      Comments: Healed surgical incision    Neurological:      Mental Status: He is alert.      Comments: Awake, alert   Psychiatric:         Mood and Affect: Mood normal.         Behavior: Behavior normal.          Relevant Results  Outside Hospital Results  No  Labs  Results from last 72 hours   Lab Units 06/05/24  0517 06/04/24  2324 06/04/24  1305   WBC AUTO x10*3/uL 25.4* 21.2* 4.7   HEMOGLOBIN g/dL 11.8* 11.1* 14.2   HEMATOCRIT % 37.1* 34.0* 44.4   PLATELETS AUTO x10*3/uL 247 239 375   NEUTROS PCT AUTO % 95.2  --   --  " "  LYMPHO PCT MAN %  --   --  26.0   LYMPHS PCT AUTO % 1.1  --   --    MONO PCT MAN %  --   --  3.0   MONOS PCT AUTO % 2.3  --   --    EOSINO PCT MAN %  --   --  6.0   EOS PCT AUTO % 0.0  --   --      Results from last 72 hours   Lab Units 06/05/24 0517 06/04/24 2324 06/04/24  1305   SODIUM mmol/L 138 136 144   POTASSIUM mmol/L 4.5 3.8 3.9   CHLORIDE mmol/L 105 103 106   CO2 mmol/L 20* 20* 25   BUN mg/dL 23 19 17   CREATININE mg/dL 1.30 1.40 0.70   GLUCOSE mg/dL 103* 100* 98   CALCIUM mg/dL 8.1* 8.4* 9.7   ANION GAP mmol/L 13 13 13   EGFR mL/min/1.73m*2 79 72 >90     Results from last 72 hours   Lab Units 06/05/24 0517 06/04/24 2324 06/04/24  1305   ALK PHOS U/L 65 67 76   BILIRUBIN TOTAL mg/dL 1.4* 0.9 0.8   PROTEIN TOTAL g/dL 6.2 5.9 8.0*   ALT U/L 157* 166* 194*   AST U/L 56* 65* 61*   ALBUMIN g/dL 3.3* 3.3* 4.3     Estimated Creatinine Clearance: 125 mL/min (by C-G formula based on SCr of 1.3 mg/dL).  No results found for: \"CRP\", \"SEDRATE\"  No results found for: \"HIV1X2\", \"HIVCONF\", \"JXLQHJ8UV\"  No results found for: \"HEPCABINIT\", \"HEPCAB\", \"HCVPCRQUANT\"  Microbiology  Susceptibility data from last 90 days.  Collected Specimen Info Organism   06/04/24 Blood culture from Peripheral Venipuncture Escherichia coli   06/04/24 Blood culture from Peripheral Venipuncture Escherichia coli       Imaging  ECG 12 Lead    Result Date: 6/5/2024  Sinus tachycardia Otherwise normal ECG When compared with ECG of 01-MAY-2024 03:01, No significant change was found Confirmed by Earl Romano ([id number]) on 6/5/2024 10:59:53 AM    XR chest 1 view    Result Date: 6/5/2024  Interpreted By:  Isaias Wing, STUDY: XR CHEST 1 VIEW;  6/5/2024 3:15 am   INDICATION: Signs/Symptoms:sob.   COMPARISON: 05/01/2024   ACCESSION NUMBER(S): [id number]   ORDERING CLINICIAN: MANJINDER ODONNELL   FINDINGS:     The cardiomediastinal silhouette and pulmonary vasculature are within normal limits. No consolidation, pleural effusion or pneumothorax.   "       No acute cardiopulmonary process.     MACRO: None.   Signed by: Isaias Wing 6/5/2024 3:35 AM Dictation workstation:   [id number]    CT abdomen pelvis w IV contrast    Result Date: 6/4/2024  Interpreted By:  Julianna Tolliver, STUDY: CT ABDOMEN PELVIS W IV CONTRAST;  6/4/2024 3:03 pm   INDICATION: Signs/Symptoms:abdominal pain, hematuria, bandemia.   COMPARISON: None.   ACCESSION NUMBER(S): [id number]   ORDERING CLINICIAN: VIDHYA WYATT   TECHNIQUE: CT of the abdomen and pelvis was performed.  75 mL Omnipaque 350   FINDINGS: LOWER CHEST: Images of the lung bases show no infiltrate or pleural fluid. There is flame shaped tissue in the retroareolar region of the left breast, likely unilateral gynecomastia.   ABDOMEN:   LIVER: There is no hepatic mass. There is fatty infiltration of the liver.   BILE DUCTS: There is no intrahepatic, common hepatic or common bile ductal dilatation.   GALLBLADDER: The gallbladder is unremarkable.   PANCREAS: The pancreas is unremarkable.   SPLEEN: The spleen is unremarkable. There is no splenic mass or splenomegaly.   ADRENAL GLANDS: The adrenal glands are unremarkable.   KIDNEYS AND URETERS: The kidneys function symmetrically. The kidneys demonstrate no mass. There is no intrarenal calculus or hydronephrosis. There is a Godoy catheter insufflated in the bladder. There is hyperdense fluid in the bladder. This is consistent with blood. This could be secondary to hemorrhagic cystitis on or iatrogenic from Godoy catheter placement.   BOWEL: There is no bowel wall thickening, dilatation or obstruction. The appendix is normal.   VESSELS: The abdominal and pelvic vessels are unremarkable.   PERITONEUM/RETROPERITONEUM/LYMPH NODES: There is no retroperitoneal or pelvic adenopathy.  There is no ascites.   ABDOMINAL WALL: The abdominal wall is unremarkable.   BONE AND SOFT TISSUE: There is no acute osseous finding. Status post partial resection spinous process L4   There is no soft  tissue abnormality.       1. Castaneda catheter insufflated in the bladder. There is hyperdense material in the bladder lumen. This is either iatrogenic from catheter placement or hemorrhagic cystitis. 2. Fatty infiltration of the liver.   MACRO: None   Signed by: Julianna Tolliver 6/4/2024 3:32 PM Dictation workstation:   [id number]     Assessment/Plan   Sepsis secondary to gram negative bacteremia  Acute kidney injury, resolving  Fever, secondary to below  E. Coli bacteremia likely secondary to UTI  Hematuria, likely secondary to UTI, castaneda catheter   Pyuria versus urinary tract infection  Status post Recent spine surgery    IV meropenem  IV vancomycin  Monitor renal function  Follow blood culture  Follow urine culture  Monitor temperature and WBC  Orthopedic spine  surgery consult    Total time spent caring for the patient today was 45 minutes. This includes time spent before the visit reviewing the chart, time spent during the visit, and time spent after the visit on documentation   MARCELINO Bermudez-CNP

## 2024-06-05 NOTE — PROGRESS NOTES
Occupational Therapy    Evaluation/Treatment    Patient Name: Erwin Cline  MRN: [id number]  : 2000  Today's Date: 24  Time Calculation  Start Time: 09  Stop Time: 1009  Time Calculation (min): 26 min       Assessment:  OT Assessment: 23 y/o male here with UTI, sepsis, weakness. on eval, he requires assist x2 for all xfers, mobility and self care d/t decreased strength, balance, activity tolerance.  Skilled OT services are required to maximize safety/IND prior to DC  Prognosis: Good  Barriers to Discharge: Other (Comment) (assist x2 for all xfers/self care, decreased activity tolerance, tolerating only short distance mobility/xfers only. high fall risk.)  Evaluation/Treatment Tolerance: Patient limited by fatigue  Medical Staff Made Aware: Yes  End of Session Communication: Bedside nurse  End of Session Patient Position: Up in chair, Alarm off, caregiver present  OT Assessment Results: Decreased ADL status, Decreased upper extremity strength, Decreased safe judgment during ADL, Decreased cognition, Decreased endurance, Decreased functional mobility  Prognosis: Good  Barriers to Discharge: Other (Comment) (assist x2 for all xfers/self care, decreased activity tolerance, tolerating only short distance mobility/xfers only. high fall risk.)  Evaluation/Treatment Tolerance: Patient limited by fatigue  Medical Staff Made Aware: Yes  Strengths: Support of Caregivers  Barriers to Participation: Comorbidities  Plan:  Treatment Interventions: ADL retraining, UE strengthening/ROM, Functional transfer training, Endurance training, Cognitive reorientation, Patient/family training, Equipment evaluation/education, Neuromuscular reeducation, Compensatory technique education  OT Frequency: 4 times per week  OT Discharge Recommendations: Moderate intensity level of continued care  Equipment Recommended upon Discharge: Wheeled walker  OT Recommended Transfer Status: Assist of 2  OT - OK to Discharge:  Yes  Treatment Interventions: ADL retraining, UE strengthening/ROM, Functional transfer training, Endurance training, Cognitive reorientation, Patient/family training, Equipment evaluation/education, Neuromuscular reeducation, Compensatory technique education    Subjective   Current Problem:  1. Severe sepsis (Multi)        2. Pyelonephritis        3. Hematuria, unspecified type          General:   OT Received On: 06/05/24  General  Reason for Referral: decreased ADLS, UTI/SIRS  Referred By: Dr. Geller  Past Medical History Relevant to Rehab: ADHD, asperger's, OCD, recent lumbar lami d/t cauda equina syndrome  Family/Caregiver Present: No  Co-Treatment: PT  Co-Treatment Reason: partial co treat for safe mobility  Prior to Session Communication: Bedside nurse  Patient Position Received: Bed, 4 rail up  Preferred Learning Style: verbal, visual  General Comment: Cleared by nsg pt met in supine, agreeable to OT session  Precautions:  Medical Precautions: Fall precautions, Spinal precautions  Post-Surgical Precautions: Spinal precautions  Vital Signs:  Heart Rate: (!) 116  Heart Rate Source: Monitor  SpO2: 95 %  BP: (!) 111/98 (104/45 (61) in supine, 118/81 (91) sitting EOB,  111/98 (105) sitting in recliner)  MAP (mmHg): 105  BP Location: Left arm  BP Method: Automatic  Patient Position: Sitting  Pain:  Pain Assessment  Pain Assessment: 0-10  Pain Score: 0 - No pain    Objective   Cognition:  Overall Cognitive Status: Impaired  Cognition Comments: lethargic, flat affect but calm and cooperative.    Home Living:  Type of Home: House  Lives With: Parent(s)  Home Adaptive Equipment: Walker rolling or standard  Home Layout: One level  Home Access: Stairs to enter with rails  Entrance Stairs-Number of Steps: 5  Home Living Comments: has been home from SNF for ~1 week, living with parents, has set up in garage    Prior Function:  Level of [place]: Needs assistance with ADLs, Needs assistance with homemaking, Needs  "assistance with functional transfers  Receives Help From: Parent(s)  ADL Assistance:  (sponge bathing, requires assist for LB dressing, parents report he is incontinent \"due to laziness\")  Homemaking Assistance: Needs assistance (parents manage)  Ambulatory Assistance: Needs assistance (with FWW, short distances only)  Leisure: enjoys reading  Hand Dominance: Right    ADL:  Eating Assistance: Stand by  Eating Deficit: Setup  Grooming Assistance: Stand by  Grooming Deficit: Setup  Bathing Assistance: Maximal  Bathing Deficit: Perineal area, Buttocks  UE Dressing Assistance: Maximal  UE Dressing Deficit: Pull around back, Thread LUE, Thread RUE (gown mgmt)  LE Dressing Assistance: Maximal  LE Dressing Deficit: Don/doff L sock, Don/doff R sock  Toileting Assistance with Device: Total  Toileting Deficit: Incontinent (assist x 2 in stance for posterior hygiene; incontinent of BM)  ADL Comments: fatigues after ~30s standing, requires return to sitting d/t LE weakness    Activity Tolerance:  Endurance: Decreased tolerance for upright activites  Activity Tolerance Comments: d/t prolonged immobility, weakness and decline in function    Bed Mobility/Transfers:   Bed Mobility  Bed Mobility: Yes  Bed Mobility 1  Bed Mobility 1: Supine to sitting  Level of Assistance 1: Moderate assistance, +2  Bed Mobility Comments 1: for trunk up, cues for log roll technique and safety    Transfers  Transfer: Yes  Transfer 1  Transfer From 1: Bed to  Transfer to 1: Chair with arms  Technique 1: Stand to sit, Sit to stand  Transfer Device 1: Walker  Transfer Level of Assistance 1: Moderate assistance, +2  Trials/Comments 1: STS from EOB x3 trials; tolerates ~30s in stance each time, cues for weight shift, hand placement, walker mgmt  Transfers 2  Transfer From 2: Bed to  Transfer to 2: Chair with arms  Technique 2: Stand pivot  Transfer Device 2: Walker  Transfer Level of Assistance 2: Moderate assistance, +2  Trials/Comments 2: assist required " for walker mgmt, body positioning, cued for hand placement and task sequencing, fair eccentric control during descent    Functional Mobility:  Functional Mobility  Functional Mobility Performed: No    Sitting Balance:  Static Sitting Balance  Static Sitting-Balance Support: Feet supported, Bilateral upper extremity supported  Static Sitting-Level of Assistance: Contact guard  Static Sitting-Comment/Number of Minutes: sat EOB for ~5 minutes today    Vision:Vision - Basic Assessment  Current Vision: No visual deficits  Sensation:  Sensation Comment: + saddle paraesthesia reported  Strength:  Strength Comments: BUEs at least >/= 3/5, observed functionally  Hand Function:  Hand Function  Gross Grasp: Functional  Coordination: Functional  Extremities: RUE   RUE : Exceptions to WFL (generalized weakness noted) and LUE   LUE: Exceptions to WFL (generalized weakness noted)    Outcome Measures: [facility] Daily Activity  Putting on and taking off regular lower body clothing: Total  Bathing (including washing, rinsing, drying): Total  Putting on and taking off regular upper body clothing: Total  Toileting, which includes using toilet, bedpan or urinal: Total  Taking care of personal grooming such as brushing teeth: A little  Eating Meals: A little  Daily Activity - Total Score: 10      Education Documentation  Body Mechanics, taught by Hans Malone OT at 6/5/2024 10:37 AM.  Learner: Patient  Readiness: Acceptance  Method: Explanation  Response: Needs Reinforcement    Precautions, taught by Hans Malone OT at 6/5/2024 10:37 AM.  Learner: Patient  Readiness: Acceptance  Method: Explanation  Response: Needs Reinforcement    Education Comments  No comments found.        OP EDUCATION:  Education  Individual(s) Educated: Patient  Education Provided: Fall precautons, Risk and benefits of OT discussed with patient or other, POC discussed and agreed upon  Risk and Benefits Discussed with Patient/Caregiver/Other: yes  Patient/Caregiver  Demonstrated Understanding: yes  Plan of Care Discussed and Agreed Upon: yes  Patient Response to Education: Patient/Caregiver Verbalized Understanding of Information    Goals:  Encounter Problems       Encounter Problems (Active)       OT Goals       ADLs (Progressing)       Start:  06/05/24    Expected End:  06/19/24       Patient will complete ADLS with MIN A using AE/compensatory techniques as needed.          Functional Transfers (Progressing)       Start:  06/05/24    Expected End:  06/19/24       Patient will complete functional transfers, including but not limited to: bed, chair, toilet transfers with MIN A using LRD.         UE Strengthening (Progressing)       Start:  06/05/24    Expected End:  06/19/24       Patient will improve UE strength to 4/5 in preparation for ADLs/transfers.

## 2024-06-05 NOTE — PROGRESS NOTES
"Erwin Cline is a 24 y.o. male on day 1 of admission presenting with E coli bacteremia.    Subjective   Seen and examined cultures positive for E. coli on IV meropenem developed transient shock required initiation of vasopressors and additional fluids his lactic acid is cleared sitting up in chair awake alert interactive echocardiogram being requested previous surgical incision appears stable require imaging of his lumbar spine given his recent surgery and E. coli bacteremia able to de-escalate from ICU to stepdown level of care       Objective     Physical Exam  Vitals and nursing note reviewed.   Constitutional:       Appearance: Normal appearance. He is obese.   HENT:      Head: Atraumatic.      Mouth/Throat:      Mouth: Mucous membranes are dry.   Cardiovascular:      Rate and Rhythm: Normal rate and regular rhythm.      Pulses: Normal pulses.      Heart sounds: Normal heart sounds.   Pulmonary:      Effort: Pulmonary effort is normal.      Breath sounds: Normal breath sounds.   Abdominal:      General: Abdomen is flat.      Palpations: Abdomen is soft.   Musculoskeletal:         General: Normal range of motion.      Cervical back: Normal range of motion and neck supple.   Skin:     General: Skin is warm and dry.      Capillary Refill: Capillary refill takes less than 2 seconds.   Neurological:      General: No focal deficit present.      Mental Status: He is oriented to person, place, and time. Mental status is at baseline.   Psychiatric:         Mood and Affect: Mood normal.         Last Recorded Vitals  Blood pressure 101/62, pulse 104, temperature 36.3 °C (97.3 °F), temperature source Temporal, resp. rate 23, height 1.905 m (6' 3\"), weight (!) 199 kg (438 lb 7.9 oz), SpO2 98%.  Intake/Output last 3 Shifts:  I/O last 3 completed shifts:  In: 4725.8 (23.8 mL/kg) [P.O.:480; I.V.:645.8 (3.2 mL/kg); IV Piggyback:3600]  Out: 550 (2.8 mL/kg) [Urine:550 (0.1 mL/kg/hr)]  Weight: 198.9 kg     Relevant " Results           This patient currently has cardiac telemetry ordered; if you would like to modify or discontinue the telemetry order, click here to go to the orders activity to modify/discontinue the order.  Results for orders placed or performed during the hospital encounter of 06/04/24 (from the past 24 hour(s))   Blood Culture    Specimen: Peripheral Venipuncture; Blood culture   Result Value Ref Range    Blood Culture Escherichia coli (AA)     BLOOD CULTURE BOTTLE  Positive Aerobic and Anaerobic Bottle     Gram Stain Gram negative bacilli (AA)    Blood Gas Lactic Acid, Venous   Result Value Ref Range    POCT Lactate, Venous 4.5 (HH) 0.4 - 2.0 mmol/L   Blood Culture    Specimen: Peripheral Venipuncture; Blood culture   Result Value Ref Range    Blood Culture Escherichia coli (AA)     BLOOD CULTURE BOTTLE  Positive Aerobic and Anaerobic Bottle     Gram Stain Gram negative bacilli (AA)    Blood Gas Lactic Acid, Venous   Result Value Ref Range    POCT Lactate, Venous 3.6 (H) 0.4 - 2.0 mmol/L   BLOOD GAS LACTIC ACID, VENOUS   Result Value Ref Range    POCT Lactate, Venous 6.3 (HH) 0.4 - 2.0 mmol/L   Creatine Kinase   Result Value Ref Range    Creatine Kinase 175 24 - 195 U/L   Comprehensive Metabolic Panel   Result Value Ref Range    Glucose 100 (H) 65 - 99 mg/dL    Sodium 136 133 - 145 mmol/L    Potassium 3.8 3.4 - 5.1 mmol/L    Chloride 103 97 - 107 mmol/L    Bicarbonate 20 (L) 24 - 31 mmol/L    Urea Nitrogen 19 8 - 25 mg/dL    Creatinine 1.40 0.40 - 1.60 mg/dL    eGFR 72 >60 mL/min/1.73m*2    Calcium 8.4 (L) 8.5 - 10.4 mg/dL    Albumin 3.3 (L) 3.5 - 5.0 g/dL    Alkaline Phosphatase 67 35 - 125 U/L    Total Protein 5.9 5.9 - 7.9 g/dL    AST 65 (H) 5 - 40 U/L    Bilirubin, Total 0.9 0.1 - 1.2 mg/dL     (H) 5 - 40 U/L    Anion Gap 13 <=19 mmol/L   CBC   Result Value Ref Range    WBC 21.2 (H) 4.4 - 11.3 x10*3/uL    nRBC 0.0 0.0 - 0.0 /100 WBCs    RBC 3.98 (L) 4.50 - 5.90 x10*6/uL    Hemoglobin 11.1 (L) 13.5 -  17.5 g/dL    Hematocrit 34.0 (L) 41.0 - 52.0 %    MCV 85 80 - 100 fL    MCH 27.9 26.0 - 34.0 pg    MCHC 32.6 32.0 - 36.0 g/dL    RDW 14.6 (H) 11.5 - 14.5 %    Platelets 239 150 - 450 x10*3/uL   NT-PROBNP   Result Value Ref Range    PROBNP 451 (H) 0 - 93 pg/mL   Serial Troponin, Initial ([facility])   Result Value Ref Range    Troponin T, High Sensitivity 9 <=14 ng/L   ECG 12 Lead   Result Value Ref Range    Ventricular Rate 130 BPM    Atrial Rate 130 BPM    NJ Interval 128 ms    QRS Duration 82 ms    QT Interval 296 ms    QTC Calculation(Bazett) 435 ms    P Axis 72 degrees    R Axis 50 degrees    T Axis 57 degrees    QRS Count 21 beats    Q Onset 219 ms    P Onset 155 ms    P Offset 207 ms    T Offset 367 ms    QTC Fredericia 383 ms   PST Top   Result Value Ref Range    Extra Tube Hold for add-ons.    CBC and Auto Differential   Result Value Ref Range    WBC 25.4 (H) 4.4 - 11.3 x10*3/uL    nRBC 0.0 0.0 - 0.0 /100 WBCs    RBC 4.28 (L) 4.50 - 5.90 x10*6/uL    Hemoglobin 11.8 (L) 13.5 - 17.5 g/dL    Hematocrit 37.1 (L) 41.0 - 52.0 %    MCV 87 80 - 100 fL    MCH 27.6 26.0 - 34.0 pg    MCHC 31.8 (L) 32.0 - 36.0 g/dL    RDW 14.9 (H) 11.5 - 14.5 %    Platelets 247 150 - 450 x10*3/uL    Neutrophils % 95.2 40.0 - 80.0 %    Immature Granulocytes %, Automated 1.2 (H) 0.0 - 0.9 %    Lymphocytes % 1.1 13.0 - 44.0 %    Monocytes % 2.3 2.0 - 10.0 %    Eosinophils % 0.0 0.0 - 6.0 %    Basophils % 0.2 0.0 - 2.0 %    Neutrophils Absolute 24.23 (H) 1.20 - 7.70 x10*3/uL    Immature Granulocytes Absolute, Automated 0.31 0.00 - 0.70 x10*3/uL    Lymphocytes Absolute 0.27 (L) 1.20 - 4.80 x10*3/uL    Monocytes Absolute 0.58 0.10 - 1.00 x10*3/uL    Eosinophils Absolute 0.00 0.00 - 0.70 x10*3/uL    Basophils Absolute 0.04 0.00 - 0.10 x10*3/uL   Comprehensive Metabolic Panel   Result Value Ref Range    Glucose 103 (H) 65 - 99 mg/dL    Sodium 138 133 - 145 mmol/L    Potassium 4.5 3.4 - 5.1 mmol/L    Chloride 105 97 - 107 mmol/L    Bicarbonate 20 (L)  24 - 31 mmol/L    Urea Nitrogen 23 8 - 25 mg/dL    Creatinine 1.30 0.40 - 1.60 mg/dL    eGFR 79 >60 mL/min/1.73m*2    Calcium 8.1 (L) 8.5 - 10.4 mg/dL    Albumin 3.3 (L) 3.5 - 5.0 g/dL    Alkaline Phosphatase 65 35 - 125 U/L    Total Protein 6.2 5.9 - 7.9 g/dL    AST 56 (H) 5 - 40 U/L    Bilirubin, Total 1.4 (H) 0.1 - 1.2 mg/dL     (H) 5 - 40 U/L    Anion Gap 13 <=19 mmol/L   BLOOD GAS LACTIC ACID, VENOUS   Result Value Ref Range    POCT Lactate, Venous 2.0 0.4 - 2.0 mmol/L   Vancomycin   Result Value Ref Range    Vancomycin 47.1 (H) 10.0 - 20.0 ug/mL   Serial Troponin, 6 Hour ([facility])   Result Value Ref Range    Troponin T, High Sensitivity 9 <=14 ng/L     Scheduled medications  acetaminophen, 975 mg, oral, Once  docusate sodium, 100 mg, oral, BID  enoxaparin, 40 mg, subcutaneous, Daily  lidocaine, 5 mL, infiltration, Once  meropenem, 2 g, intravenous, q8h      Continuous medications   dextrose 5% lactated Ringer's with KCl 20 mEq/L, 75 mL/hr      PRN medications  PRN medications: acetaminophen, methocarbamol, polyethylene glycol  Susceptibility data from last 90 days.  Collected Specimen Info Organism   06/04/24 Blood culture from Peripheral Venipuncture Escherichia coli   06/04/24 Blood culture from Peripheral Venipuncture Escherichia coli                    Assessment/Plan   Principal Problem:    E coli bacteremia  Active Problems:    Morbid obesity (Multi)    Septic shock (Multi)    Gross hematuria    Gram negative sepsis (Multi)    De-escalate from ICU check echocardiogram discussion with infectious disease regarding need for imaging of the lumbar spine repeat blood cultures in approximately 48 hours to ensure clearance increase PT OT and activity advance diet       I spent 45 minutes in the professional and overall care of this patient.      Mati Geller DO

## 2024-06-05 NOTE — PROGRESS NOTES
06/05/24 1012   Discharge Planning   Living Arrangements Parent   Support Systems Parent   Assistance Needed patient reports he is independent of ADLs and parents currently provide for IADLs.   Type of Residence Private residence   Number of Stairs to Enter Residence 1   Number of Stairs Within Residence 0   Do you have animals or pets at home? Yes   Type of Animals or Pets 2 dogs, 2 cats   Who is requesting discharge planning? Provider   Home or Post Acute Services None   Patient expects to be discharged to: Home   Does the patient need discharge transport arranged? Yes   RoundTrip coordination needed? Yes   Has discharge transport been arranged? No   Financial Resource Strain   How hard is it for you to pay for the very basics like food, housing, medical care, and heating? Not very   Housing Stability   In the last 12 months, was there a time when you were not able to pay the mortgage or rent on time? N   In the last 12 months, how many places have you lived? 2   In the last 12 months, was there a time when you did not have a steady place to sleep or slept in a shelter (including now)? N   Transportation Needs   In the past 12 months, has lack of transportation kept you from medical appointments or from getting medications? no   In the past 12 months, has lack of transportation kept you from meetings, work, or from getting things needed for daily living? No     Patient reports he lives at home in his own apartment generally, but currently is staying with his parents while recovering from surgery from 4 weeks ago. Patient reports he uses a walker at home. Currently denies any home going needs or concerns upon discharge.

## 2024-06-05 NOTE — CARE PLAN
The patient's goals for the shift include thirsty    The clinical goals for the shift include complete admission process      Problem: Pain - Adult  Goal: Verbalizes/displays adequate comfort level or baseline comfort level  Outcome: Progressing  Flowsheets (Taken 6/4/2024 2021)  Verbalizes/displays adequate comfort level or baseline comfort level:   Assess pain using appropriate pain scale   Implement non-pharmacological measures as appropriate and evaluate response   Notify Licensed Independent Practitioner if interventions unsuccessful or patient reports new pain     Problem: Discharge Planning  Goal: Discharge to home or other facility with appropriate resources  Outcome: Progressing  Flowsheets (Taken 6/4/2024 2021)  Discharge to home or other facility with appropriate resources:   Identify barriers to discharge with patient and caregiver   Arrange for needed discharge resources and transportation as appropriate   Identify discharge learning needs (meds, wound care, etc)   Arrange for interpreters to assist at discharge as needed   Refer to discharge planning if patient needs post-hospital services based on physician order or complex needs related to functional status, cognitive ability or social support system     Problem: Chronic Conditions and Co-morbidities  Goal: Patient's chronic conditions and co-morbidity symptoms are monitored and maintained or improved  Outcome: Progressing  Flowsheets (Taken 6/4/2024 2021)  Care Plan - Patient's Chronic Conditions and Co-Morbidity Symptoms are Monitored and Maintained or Improved: Collaborate with multidisciplinary team to address chronic and comorbid conditions and prevent exacerbation or deterioration     Problem: Skin  Goal: Participates in plan/prevention/treatment measures  Outcome: Progressing  Flowsheets (Taken 6/4/2024 2021)  Participates in plan/prevention/treatment measures:   Discuss with provider PT/OT consult   Elevate heels   Increase activity/out of  bed for meals  Goal: Prevent/manage excess moisture  Outcome: Progressing  Flowsheets (Taken 6/4/2024 2021)  Prevent/manage excess moisture:   Cleanse incontinence/protect with barrier cream   Moisturize dry skin   Use wicking fabric (obtain order)   Monitor for/manage infection if present   Follow provider orders for dressing changes  Goal: Prevent/minimize sheer/friction injuries  Outcome: Progressing  Flowsheets (Taken 6/4/2024 2021)  Prevent/minimize sheer/friction injuries:   Complete micro-shifts as needed if patient unable. Adjust patient position to relieve pressure points, not a full turn   Increase activity/out of bed for meals   Use pull sheet   HOB 30 degrees or less   Turn/reposition every 2 hours/use positioning/transfer devices   Utilize specialty bed per algorithm  Goal: Promote/optimize nutrition  Outcome: Progressing  Flowsheets (Taken 6/4/2024 2021)  Promote/optimize nutrition:   Monitor/record intake including meals   Reassess MST if dietician not consulted   Offer water/supplements/favorite foods  Goal: Promote skin healing  Outcome: Progressing  Flowsheets (Taken 6/4/2024 2021)  Promote skin healing:   Assess skin/pad under line(s)/device(s)   Protective dressings over bony prominences   Turn/reposition every 2 hours/use positioning/transfer devices   Ensure correct size (line/device) and apply per  instructions     Problem: Fall/Injury  Goal: Not fall by end of shift  Outcome: Progressing  Goal: Be free from injury by end of the shift  Outcome: Progressing  Goal: Verbalize understanding of personal risk factors for fall in the hospital  Outcome: Progressing  Goal: Verbalize understanding of risk factor reduction measures to prevent injury from fall in the home  Outcome: Progressing  Goal: Use assistive devices by end of the shift  Outcome: Progressing  Goal: Pace activities to prevent fatigue by end of the shift  Outcome: Progressing

## 2024-06-05 NOTE — SIGNIFICANT EVENT
Came to evaluate patient.  Blood pressure is low 100s he is tachycardic and febrile.  He is alert oriented x 3 cooperative with me mentating well, saturating 98% on room air.  Normocephalic atraumatic EOMI PERRLA oral mucosa dry  Neck supple  Chest clear  Heart regular tachycardic  Abdomen soft nontender  Extremities no significant edema good perfusing pulses all 4 extremities  Neurologic moves all 4 extremities at baseline    Chart reviewed.    Impression:  Sepsis likely from urinary source.  Will give additional liter of fluid will check labs will order Tylenol.  Will change antibiotics.  Will continue to monitor discussed with nurse in detail.   critical care time 60 minutes  Updated 1:20 AM: Sleeping.  BP better fever better.  Labs reviewed.  Will order maintenance fluids    Updated 3:25 AM:     Came to reevaluate patient for elevated heart rate.  Blood pressure 130/80 heart rate is about 150/min he is saturating 99% on room air but appears visibly somewhat short of breath.  Apparently he had a moment when he wanted to sit up and was retching.  He tells me that he sometimes does that.  Denies chest pain or abdominal pain.  Admits to feeling somewhat short of breath.  EKG was done finally by the nurse showing sinus tachycardia.  Temperature personally taken by me exhilarated is 39.2 Celsius.  Chest no wheezing may be occasional crackles, chest x-ray at the bedside is of poor quality due to his body habitus questioning volume overload.  I feel his tachycardia is related to his fever.  With his vague shortness of breath now I am slightly concerned about may be volume overload so we will back off on any further fluids.  If becomes hypotensive we will going to use Levophed.  Will give Motrin/toradol for the fever will continue to monitor

## 2024-06-06 ENCOUNTER — TELEPHONE (OUTPATIENT)
Dept: LDA SERVICES | Facility: HOSPITAL | Age: 24
End: 2024-06-06

## 2024-06-06 LAB
ALBUMIN SERPL-MCNC: 3 G/DL (ref 3.5–5)
ALP BLD-CCNC: 88 U/L (ref 35–125)
ALT SERPL-CCNC: 154 U/L (ref 5–40)
ANION GAP SERPL CALC-SCNC: 10 MMOL/L
AST SERPL-CCNC: 84 U/L (ref 5–40)
BASOPHILS # BLD AUTO: 0.02 X10*3/UL (ref 0–0.1)
BASOPHILS NFR BLD AUTO: 0.3 %
BILIRUB SERPL-MCNC: 1.6 MG/DL (ref 0.1–1.2)
BUN SERPL-MCNC: 12 MG/DL (ref 8–25)
CALCIUM SERPL-MCNC: 8.3 MG/DL (ref 8.5–10.4)
CHLORIDE SERPL-SCNC: 106 MMOL/L (ref 97–107)
CO2 SERPL-SCNC: 22 MMOL/L (ref 24–31)
CREAT SERPL-MCNC: 0.7 MG/DL (ref 0.4–1.6)
EGFRCR SERPLBLD CKD-EPI 2021: >90 ML/MIN/1.73M*2
EOSINOPHIL # BLD AUTO: 0.04 X10*3/UL (ref 0–0.7)
EOSINOPHIL NFR BLD AUTO: 0.5 %
ERYTHROCYTE [DISTWIDTH] IN BLOOD BY AUTOMATED COUNT: 14.8 % (ref 11.5–14.5)
GLUCOSE SERPL-MCNC: 98 MG/DL (ref 65–99)
HCT VFR BLD AUTO: 32.9 % (ref 41–52)
HGB BLD-MCNC: 10.7 G/DL (ref 13.5–17.5)
IMM GRANULOCYTES # BLD AUTO: 0.07 X10*3/UL (ref 0–0.7)
IMM GRANULOCYTES NFR BLD AUTO: 0.9 % (ref 0–0.9)
LYMPHOCYTES # BLD AUTO: 0.79 X10*3/UL (ref 1.2–4.8)
LYMPHOCYTES NFR BLD AUTO: 10 %
MAGNESIUM SERPL-MCNC: 1.7 MG/DL (ref 1.6–3.1)
MCH RBC QN AUTO: 27.2 PG (ref 26–34)
MCHC RBC AUTO-ENTMCNC: 32.5 G/DL (ref 32–36)
MCV RBC AUTO: 84 FL (ref 80–100)
MONOCYTES # BLD AUTO: 0.48 X10*3/UL (ref 0.1–1)
MONOCYTES NFR BLD AUTO: 6.1 %
NEUTROPHILS # BLD AUTO: 6.53 X10*3/UL (ref 1.2–7.7)
NEUTROPHILS NFR BLD AUTO: 82.2 %
NRBC BLD-RTO: 0 /100 WBCS (ref 0–0)
PLATELET # BLD AUTO: 143 X10*3/UL (ref 150–450)
POTASSIUM SERPL-SCNC: 3.6 MMOL/L (ref 3.4–5.1)
PROT SERPL-MCNC: 6.2 G/DL (ref 5.9–7.9)
RBC # BLD AUTO: 3.93 X10*6/UL (ref 4.5–5.9)
SODIUM SERPL-SCNC: 138 MMOL/L (ref 133–145)
WBC # BLD AUTO: 7.9 X10*3/UL (ref 4.4–11.3)

## 2024-06-06 PROCEDURE — 85025 COMPLETE CBC W/AUTO DIFF WBC: CPT | Performed by: INTERNAL MEDICINE

## 2024-06-06 PROCEDURE — 97116 GAIT TRAINING THERAPY: CPT | Mod: GP

## 2024-06-06 PROCEDURE — 87040 BLOOD CULTURE FOR BACTERIA: CPT | Mod: TRILAB | Performed by: NURSE PRACTITIONER

## 2024-06-06 PROCEDURE — 97530 THERAPEUTIC ACTIVITIES: CPT | Mod: GP

## 2024-06-06 PROCEDURE — 2500000001 HC RX 250 WO HCPCS SELF ADMINISTERED DRUGS (ALT 637 FOR MEDICARE OP): Performed by: NURSE PRACTITIONER

## 2024-06-06 PROCEDURE — 2500000004 HC RX 250 GENERAL PHARMACY W/ HCPCS (ALT 636 FOR OP/ED): Performed by: INTERNAL MEDICINE

## 2024-06-06 PROCEDURE — 2060000001 HC INTERMEDIATE ICU ROOM DAILY

## 2024-06-06 PROCEDURE — 36415 COLL VENOUS BLD VENIPUNCTURE: CPT | Performed by: INTERNAL MEDICINE

## 2024-06-06 PROCEDURE — 80053 COMPREHEN METABOLIC PANEL: CPT | Performed by: INTERNAL MEDICINE

## 2024-06-06 PROCEDURE — 87086 URINE CULTURE/COLONY COUNT: CPT | Mod: TRILAB | Performed by: NURSE PRACTITIONER

## 2024-06-06 PROCEDURE — 36415 COLL VENOUS BLD VENIPUNCTURE: CPT | Performed by: NURSE PRACTITIONER

## 2024-06-06 PROCEDURE — 83735 ASSAY OF MAGNESIUM: CPT | Performed by: INTERNAL MEDICINE

## 2024-06-06 RX ORDER — TRAZODONE HYDROCHLORIDE 50 MG/1
50 TABLET ORAL NIGHTLY PRN
Status: DISCONTINUED | OUTPATIENT
Start: 2024-06-06 | End: 2024-06-11 | Stop reason: HOSPADM

## 2024-06-06 RX ADMIN — DOCUSATE SODIUM 100 MG: 100 CAPSULE, LIQUID FILLED ORAL at 10:19

## 2024-06-06 RX ADMIN — ENOXAPARIN SODIUM 40 MG: 40 INJECTION SUBCUTANEOUS at 06:32

## 2024-06-06 RX ADMIN — MEROPENEM 2 G: 2 INJECTION, POWDER, FOR SOLUTION INTRAVENOUS at 10:56

## 2024-06-06 RX ADMIN — DOCUSATE SODIUM 100 MG: 100 CAPSULE, LIQUID FILLED ORAL at 21:02

## 2024-06-06 RX ADMIN — MEROPENEM 2 G: 2 INJECTION, POWDER, FOR SOLUTION INTRAVENOUS at 17:34

## 2024-06-06 RX ADMIN — DEXTROSE MONOHYDRATE, SODIUM CHLORIDE, SODIUM LACTATE, POTASSIUM CHLORIDE, CALCIUM CHLORIDE 75 ML/HR: 5; 600; 310; 179; 20 INJECTION, SOLUTION INTRAVENOUS at 12:11

## 2024-06-06 RX ADMIN — MEROPENEM 2 G: 2 INJECTION, POWDER, FOR SOLUTION INTRAVENOUS at 02:39

## 2024-06-06 ASSESSMENT — PAIN - FUNCTIONAL ASSESSMENT: PAIN_FUNCTIONAL_ASSESSMENT: 0-10

## 2024-06-06 ASSESSMENT — COGNITIVE AND FUNCTIONAL STATUS - GENERAL
WALKING IN HOSPITAL ROOM: A LITTLE
CLIMB 3 TO 5 STEPS WITH RAILING: A LOT
MOBILITY SCORE: 17
STANDING UP FROM CHAIR USING ARMS: A LITTLE
MOVING FROM LYING ON BACK TO SITTING ON SIDE OF FLAT BED WITH BEDRAILS: A LITTLE
TURNING FROM BACK TO SIDE WHILE IN FLAT BAD: A LITTLE
MOVING TO AND FROM BED TO CHAIR: A LITTLE

## 2024-06-06 ASSESSMENT — ENCOUNTER SYMPTOMS
CONFUSION: 0
HALLUCINATIONS: 0
SLEEP DISTURBANCE: 1
NERVOUS/ANXIOUS: 0
DYSPHORIC MOOD: 0
AGITATION: 0
HYPERACTIVE: 0

## 2024-06-06 ASSESSMENT — PAIN SCALES - WONG BAKER: WONGBAKER_NUMERICALRESPONSE: NO HURT

## 2024-06-06 ASSESSMENT — PAIN SCALES - GENERAL
PAINLEVEL_OUTOF10: 0 - NO PAIN

## 2024-06-06 NOTE — PROGRESS NOTES
Physical Therapy Treatment    Patient Name: Erwin Cline  MRN: [id number]  Today's Date: 6/6/2024  Time Calculation  Start Time: 1134  Stop Time: 1200  Time Calculation (min): 26 min    Improvement in functional abilities today compared to evaluation. 6 click now 17/24. Low intensity rehab now recommended at [facility]. Care coordination made aware of change.     Assessment/Plan   PT Assessment  Evaluation/Treatment Tolerance: Patient tolerated treatment well  Medical Staff Made Aware: Yes  End of Session Communication: Bedside nurse  Assessment Comment: Continues to demonstate impaired safe mobility warranting skilled PT care  End of Session Patient Position: Up in chair, Alarm on  PT Plan  Inpatient/Swing Bed or Outpatient: Inpatient  PT Plan  Treatment/Interventions: Bed mobility, Gait training, Transfer training, Balance training, Strengthening, Endurance training, Therapeutic exercise, Therapeutic activity, Home exercise program  PT Plan: Skilled PT  PT Frequency: 4 times per week  PT Discharge Recommendations: Low intensity level of continued care  Equipment Recommended upon Discharge: Wheeled walker  PT Recommended Transfer Status: Assist x1, Assistive device  PT - OK to Discharge: Yes      General Visit Information:   PT  Visit  PT Received On: 06/06/24  Response to Previous Treatment: Patient with no complaints from previous session.  General  Family/Caregiver Present: No  Prior to Session Communication: Bedside nurse  Patient Position Received: Bed, 4 rail up, Alarm off, not on at start of session  Preferred Learning Style: verbal  General Comment: Agreeable to PT follow    Subjective   Precautions:  Precautions  Medical Precautions: Fall precautions  Post-Surgical Precautions: Spinal precautions  Vital Signs:   -110 throughout session. SPO2 WNLs on RA    Objective   Pain:  Pain Assessment  Pain Assessment: 0-10  Pain Score: 0 - No pain  Cognition:  Cognition  Overall Cognitive Status:  Impaired  Cognition Comments: Lethargic and flat affect    Activity Tolerance:  Activity Tolerance  Endurance: Decreased tolerance for upright activites  Treatments:  Bed Mobility  Bed Mobility: Yes  Bed Mobility 1  Bed Mobility 1: Supine to sitting, Log roll  Level of Assistance 1: Close supervision, Moderate verbal cues  Bed Mobility Comments 1: cues for log roll    Ambulation/Gait Training  Ambulation/Gait Training Performed: Yes  Ambulation/Gait Training 1  Surface 1: Level tile  Device 1: Rolling walker  Assistance 1: Contact guard  Quality of Gait 1:  (slow, kyphotic, pushes device too far out in front him but no LOB today. CGA for safety)  Comments/Distance (ft) 1: 15'  Transfers  Transfer: Yes  Transfer 1  Transfer From 1: Bed to  Transfer to 1: Chair with arms  Technique 1: Stand pivot  Transfer Device 1: Walker  Transfer Level of Assistance 1: Contact guard  Trials/Comments 1: CGA for safety. Multiple attempts from Pt to fully upright and stand Cues on technique  Transfers 2  Transfer From 2: Chair with arms to  Transfer to 2: Stand  Technique 2: Sit to stand, Stand to sit  Transfer Device 2: Walker  Transfer Level of Assistance 2: Contact guard  Trials/Comments 2: x2 trials---several attempts to come to full standing but does so with CGA and cues for technique. Prefers 1 hand on FWW and 1 on chair    Outcome Measures:  [facility] Basic Mobility  Turning from your back to your side while in a flat bed without using bedrails: A little  Moving from lying on your back to sitting on the side of a flat bed without using bedrails: A little  Moving to and from bed to chair (including a wheelchair): A little  Standing up from a chair using your arms (e.g. wheelchair or bedside chair): A little  To walk in hospital room: A little  Climbing 3-5 steps with railing: A lot  Basic Mobility - Total Score: 17    Education Documentation  Precautions, taught by Eze Pang, PT at 6/6/2024 12:09 PM.  Learner:  Patient  Readiness: Acceptance  Method: Explanation, Demonstration  Response: Verbalizes Understanding, Needs Reinforcement  Comment: log roll, spine precautions    Mobility Training, taught by Eze Pang, PT at 6/6/2024 12:08 PM.  Learner: Patient  Readiness: Acceptance  Method: Explanation, Demonstration  Response: Needs Reinforcement, Verbalizes Understanding  Comment: safe mobility techniques    Education Comments  No comments found.        OP EDUCATION:       Encounter Problems       Encounter Problems (Active)       PT goals       Patient will transfer supine to sit and sit to supine with supervision assist to facilitate mobility.  (Progressing)       Start:  06/05/24    Expected End:  06/19/24            Patient will transfer bed to chair and chair to bed with supervision assist to facilitate mobility.  (Progressing)       Start:  06/05/24    Expected End:  06/19/24            Patient will amb 25 feet with rolling walker device including no turns on even surface with supervision assist to facilitate safe mobility.  (Progressing)       Start:  06/05/24    Expected End:  06/19/24            Patient will increase B LE strength to 3/5 to improve functional mobility.          Start:  06/05/24    Expected End:  06/19/24               Pain - Adult

## 2024-06-06 NOTE — ED PROVIDER NOTES
Patient was seen by both myself and advanced practitioner.  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  Patient notes that for the last several days he has been having increasing fatigue and bodyaches.  Today however he noted a large amount of blood coming from his Godoy catheter.  He states that it has not been exchanged recently.  He denies chest pain, shortness of breath, nausea or vomiting.  Patient was concerned due to the large amount of blood and came in for further evaluation.    On exam patient uncomfortable appearing but in no obvious distress.  He is awake and alert and oriented.  Patient significantly tachycardic on initial presentation with a heart rate around 130.  Blood pressure is stable at this time.  Abdomen soft, nontender, nondistended.  There is significant gross hematuria present in the Godoy catheter.  Patient was given 30 cc/kg bolus of normal saline based on patient's ideal body weight.  Blood work ordered including CBC, CMP, lactic acid, urinalysis and a CT scan of the abdomen pelvis was performed.  Blood work was remarkable for normal white count of 4.7 however patient does have significant bandemia with 13% bands.  Urinalysis consistent with urinary tract infection.  Lactic acid also significantly elevated at 4.5 and did decrease down to 3.6.  Patient did receive IV ceftriaxone for suspected urinary tract infection.  CT scan of the abdomen pelvis showed significant blood products within the bladder and Godoy catheter in place however no evidence of perforation, obstruction or abscess.  Given his persistent tachycardia and higher risk given indwelling Godoy catheter and recent  surgeries patient will be admitted for continued IV antibiotic treatment at this time.  Case was discussed with hospitalist who excepted patient for admission.    1600: Sepsis perfusion reevaluation performed at this time and patient does have adequate perfusion.  Patient switched to three-way Godoy catheter and will receive continuous irrigation given his significant hematuria.  Lactic acid also improving at this time.    I independently interpreted the following study(s) CT scan of the abdomen pelvis which show Godoy catheter in place with significant blood products within the bladder however no evidence of perforation, abscess, obstruction.       Frankie Schmitt, DO  06/06/24 1119

## 2024-06-06 NOTE — PROGRESS NOTES
"Erwin Cline is a 24 y.o. male on day 2 of admission presenting with E coli bacteremia.    Subjective   Patient is doing better today, more awake and alert.  Mom is at bedside.  He did not sleep well last night, feels some muscle type pain at the base of his neck due to bed/pillow situation.  We discussed perhaps a rolled towel with a warm pack might help with the muscle pain. Discussed trazodone PRN for sleep as well.  Pt is interested in pursuing intake with [facility] and will be provided with resources.  He denies feeling depressed or anxious.  Denies thoughts of harm to self or others.         Objective     Last Recorded Vitals  Blood pressure (!) 135/93, pulse 105, temperature 36.7 °C (98.1 °F), temperature source Temporal, resp. rate 18, height 1.905 m (6' 3\"), weight (!) 200 kg (440 lb), SpO2 97%.    Review of Systems   Psychiatric/Behavioral:  Positive for sleep disturbance. Negative for agitation, behavioral problems, confusion, dysphoric mood, hallucinations and suicidal ideas. The patient is not nervous/anxious and is not hyperactive.        Physical Exam    Mental Status Exam  General: 25 y/o male lying in his assigned hospital bed, NAD  Appearance: Appeared as age stated; appropriately dressed/groomed.  Attitude: Pleasant and cooperative; guarded but warm.  Behavior: Fair EC; overall responding appropriately  Motor Activity: No notable topher PMAR  Speech: Clear, with fair phonation, and no lisp nor dysarthria.   Mood: \"good\"  Affect:  constricted, improved eye contact  Thought Process: Linear and logical; not perseverating   Thought Content: Denied SI/HI. Not voicing/endorsing delusions.  Thought Perception: Did not appear to be responding to internal stimuli. Not endorsing AVH  Cognition: Grossly intact; A&O x4/4 to self, place, date, and context.  Insight: Fair  Judgement: intact    Psychiatric Risk Assessment  Violence Risk Factors:  male and stress/destabilizers  Acute Risk of Harm to " Others is Considered: Low  Suicide Risk Factors: male and ; /[place] native  Protective Factors: positive family relationships and hopefulness/future-orientation  Acute Risk of Harm to Self is Considered: Low    Relevant Results  Results for orders placed or performed during the hospital encounter of 06/04/24 (from the past 24 hour(s))   CBC and Auto Differential   Result Value Ref Range    WBC 7.9 4.4 - 11.3 x10*3/uL    nRBC 0.0 0.0 - 0.0 /100 WBCs    RBC 3.93 (L) 4.50 - 5.90 x10*6/uL    Hemoglobin 10.7 (L) 13.5 - 17.5 g/dL    Hematocrit 32.9 (L) 41.0 - 52.0 %    MCV 84 80 - 100 fL    MCH 27.2 26.0 - 34.0 pg    MCHC 32.5 32.0 - 36.0 g/dL    RDW 14.8 (H) 11.5 - 14.5 %    Platelets 143 (L) 150 - 450 x10*3/uL    Neutrophils % 82.2 40.0 - 80.0 %    Immature Granulocytes %, Automated 0.9 0.0 - 0.9 %    Lymphocytes % 10.0 13.0 - 44.0 %    Monocytes % 6.1 2.0 - 10.0 %    Eosinophils % 0.5 0.0 - 6.0 %    Basophils % 0.3 0.0 - 2.0 %    Neutrophils Absolute 6.53 1.20 - 7.70 x10*3/uL    Immature Granulocytes Absolute, Automated 0.07 0.00 - 0.70 x10*3/uL    Lymphocytes Absolute 0.79 (L) 1.20 - 4.80 x10*3/uL    Monocytes Absolute 0.48 0.10 - 1.00 x10*3/uL    Eosinophils Absolute 0.04 0.00 - 0.70 x10*3/uL    Basophils Absolute 0.02 0.00 - 0.10 x10*3/uL   Comprehensive Metabolic Panel   Result Value Ref Range    Glucose 98 65 - 99 mg/dL    Sodium 138 133 - 145 mmol/L    Potassium 3.6 3.4 - 5.1 mmol/L    Chloride 106 97 - 107 mmol/L    Bicarbonate 22 (L) 24 - 31 mmol/L    Urea Nitrogen 12 8 - 25 mg/dL    Creatinine 0.70 0.40 - 1.60 mg/dL    eGFR >90 >60 mL/min/1.73m*2    Calcium 8.3 (L) 8.5 - 10.4 mg/dL    Albumin 3.0 (L) 3.5 - 5.0 g/dL    Alkaline Phosphatase 88 35 - 125 U/L    Total Protein 6.2 5.9 - 7.9 g/dL    AST 84 (H) 5 - 40 U/L    Bilirubin, Total 1.6 (H) 0.1 - 1.2 mg/dL     (H) 5 - 40 U/L    Anion Gap 10 <=19 mmol/L   Magnesium   Result Value Ref Range    Magnesium 1.70 1.60 - 3.10 mg/dL      Scheduled medications  acetaminophen, 975 mg, oral, Once  docusate sodium, 100 mg, oral, BID  enoxaparin, 40 mg, subcutaneous, Daily  lidocaine, 5 mL, infiltration, Once  meropenem, 2 g, intravenous, q8h      Continuous medications   dextrose 5% lactated Ringer's with KCl 20 mEq/L, 75 mL/hr, Last Rate: 75 mL/hr (06/06/24 1211)      PRN medications  PRN medications: acetaminophen, methocarbamol, polyethylene glycol       Assessment/Plan   Principal Problem:    E coli bacteremia  Active Problems:    Morbid obesity (Multi)    Septic shock (Multi)    Gross hematuria    Gram negative sepsis (Multi)  ADHD  Autism spectrum disorder by history     PLAN/ RECOMMENDATIONS:      Recs:  Trazodone 50 mg at bedtime PRN for sleep  Consider a rolled towel/warm pack for patient's sore neck for aid in sleeping.    SAFETY  - Patient does not currently meet criteria for inpatient psychiatric admission.      -Thank you for allowing us to participate in the care of this patient.  Psychiatry will continue to follow and provide resources.     I spent 27 minutes in the professional and overall care of this patient.      Kaci Richmond, APRN-CNP

## 2024-06-06 NOTE — PROGRESS NOTES
"Erwin Cline is a 24 y.o. male on day 2 of admission presenting with E coli bacteremia.    Subjective   Patient was seen at bedside. Patient had a temp of 104.3 last night and fever soon resolved. He is afebrile this morning. Cultures tested positive for E.coli bacteremia yesterday and pt is on IV meropenem and vancomycin. Infectious disease and urology consulted and agree with getting repeat lumbar imaging given his recent surgery. Patient reports constipation and discomfort in his pelvis. He is on colace but reports it is not helping. Denies new onset pain, abdominal pain, chest pain, shortness of breath.          Objective     Physical Exam  Constitutional:       Appearance: Normal appearance. He is obese.   HENT:      Head: Normocephalic and atraumatic.      Mouth/Throat:      Mouth: Mucous membranes are moist.      Pharynx: Oropharynx is clear.   Eyes:      Extraocular Movements: Extraocular movements intact.      Pupils: Pupils are equal, round, and reactive to light.   Cardiovascular:      Rate and Rhythm: Normal rate and regular rhythm.      Pulses: Normal pulses.      Heart sounds: Normal heart sounds.   Pulmonary:      Effort: Pulmonary effort is normal.      Breath sounds: Normal breath sounds.   Abdominal:      Palpations: Abdomen is soft.   Skin:     General: Skin is warm.   Neurological:      General: No focal deficit present.      Mental Status: He is alert. Mental status is at baseline.   Psychiatric:         Mood and Affect: Mood normal.         Behavior: Behavior normal.         Last Recorded Vitals  Blood pressure (!) 135/93, pulse 105, temperature 37 °C (98.6 °F), temperature source Temporal, resp. rate 18, height 1.905 m (6' 3\"), weight (!) 200 kg (440 lb), SpO2 97%.  Intake/Output last 3 Shifts:  I/O last 3 completed shifts:  In: 6667.1 (33.4 mL/kg) [P.O.:1500; I.V.:645.8 (3.2 mL/kg); IV Piggyback:4521.3]  Out: 3525 (17.7 mL/kg) [Urine:3525 (0.5 mL/kg/hr)]  Weight: 199.6 kg "     Relevant Results     This patient currently has cardiac telemetry ordered; if you would like to modify or discontinue the telemetry order, click here to go to the orders activity to modify/discontinue the order.    Scheduled medications  acetaminophen, 975 mg, oral, Once  docusate sodium, 100 mg, oral, BID  enoxaparin, 40 mg, subcutaneous, Daily  lidocaine, 5 mL, infiltration, Once  meropenem, 2 g, intravenous, q8h      Continuous medications   dextrose 5% lactated Ringer's with KCl 20 mEq/L, 75 mL/hr, Last Rate: 75 mL/hr (06/06/24 0340)      PRN medications  PRN medications: acetaminophen, methocarbamol, polyethylene glycol    Results for orders placed or performed during the hospital encounter of 06/04/24 (from the past 24 hour(s))   Transthoracic Echo (TTE) Complete   Result Value Ref Range    AV pk chris 1.53 m/s    LVOT diam 2.20 cm    AV mn grad 5.0 mmHg    AV pk grad 9.4 mmHg    Aortic Valve Area by Continuity of VTI 3.17 cm2    Aortic Valve Area by Continuity of Peak Velocity 3.06 cm2   CBC and Auto Differential   Result Value Ref Range    WBC 7.9 4.4 - 11.3 x10*3/uL    nRBC 0.0 0.0 - 0.0 /100 WBCs    RBC 3.93 (L) 4.50 - 5.90 x10*6/uL    Hemoglobin 10.7 (L) 13.5 - 17.5 g/dL    Hematocrit 32.9 (L) 41.0 - 52.0 %    MCV 84 80 - 100 fL    MCH 27.2 26.0 - 34.0 pg    MCHC 32.5 32.0 - 36.0 g/dL    RDW 14.8 (H) 11.5 - 14.5 %    Platelets 143 (L) 150 - 450 x10*3/uL    Neutrophils % 82.2 40.0 - 80.0 %    Immature Granulocytes %, Automated 0.9 0.0 - 0.9 %    Lymphocytes % 10.0 13.0 - 44.0 %    Monocytes % 6.1 2.0 - 10.0 %    Eosinophils % 0.5 0.0 - 6.0 %    Basophils % 0.3 0.0 - 2.0 %    Neutrophils Absolute 6.53 1.20 - 7.70 x10*3/uL    Immature Granulocytes Absolute, Automated 0.07 0.00 - 0.70 x10*3/uL    Lymphocytes Absolute 0.79 (L) 1.20 - 4.80 x10*3/uL    Monocytes Absolute 0.48 0.10 - 1.00 x10*3/uL    Eosinophils Absolute 0.04 0.00 - 0.70 x10*3/uL    Basophils Absolute 0.02 0.00 - 0.10 x10*3/uL   Comprehensive  Metabolic Panel   Result Value Ref Range    Glucose 98 65 - 99 mg/dL    Sodium 138 133 - 145 mmol/L    Potassium 3.6 3.4 - 5.1 mmol/L    Chloride 106 97 - 107 mmol/L    Bicarbonate 22 (L) 24 - 31 mmol/L    Urea Nitrogen 12 8 - 25 mg/dL    Creatinine 0.70 0.40 - 1.60 mg/dL    eGFR >90 >60 mL/min/1.73m*2    Calcium 8.3 (L) 8.5 - 10.4 mg/dL    Albumin 3.0 (L) 3.5 - 5.0 g/dL    Alkaline Phosphatase 88 35 - 125 U/L    Total Protein 6.2 5.9 - 7.9 g/dL    AST 84 (H) 5 - 40 U/L    Bilirubin, Total 1.6 (H) 0.1 - 1.2 mg/dL     (H) 5 - 40 U/L    Anion Gap 10 <=19 mmol/L   Magnesium   Result Value Ref Range    Magnesium 1.70 1.60 - 3.10 mg/dL       Transthoracic Echo (TTE) Complete    Result Date: 6/5/2024            [place] [address]             Phone [phone] TRANSTHORACIC ECHOCARDIOGRAM REPORT  Patient Name:      ABDIEL JACOB      Reading Physician:    [id number] Earl Romano DO Study Date:        6/5/2024             Ordering Provider:    [id number] LISSETTE HUMPHREY MRN/PID:           [id number]             Fellow: Accession#:        [id number]         Nurse: Date of Birth/Age: 2000 / 24 years Sonographer:          Izabel Olmos RDCS Gender:            M                    Additional Staff: Height:            190.50 cm            Admit Date: Weight:            198.68 kg            Admission Status:     Inpatient -                                                               Routine BSA / BMI:         3.06 m2 / 54.75      [facility]                    kg/m2 Blood Pressure: 118 /81 mmHg Study Type:    TRANSTHORACIC ECHO (TTE) COMPLETE Diagnosis/ICD: Sepsis, unspecified organism-A41.9 Indication:    Severe sepsis CPT Codes:     Echo Complete w Full  [id number] Patient History: Pertinent History: No cardiac Hx. Study Detail: The following Echo studies were performed: 2D, M-Mode, Doppler and               color flow. Image quality for this study is fair. Technically               challenging study due to poor acoustic windows and body habitus.               Definity used as a contrast agent for endocardial border               definition. Total contrast used for this procedure was 2 mL via IV               push.  PHYSICIAN INTERPRETATION: Left Ventricle: Left ventricular systolic function is hyperdynamic, with an estimated ejection fraction of 70-75%. There are no regional wall motion abnormalities. The left ventricular cavity size is normal. Spectral Doppler shows a normal pattern of left ventricular diastolic filling. Left Atrium: The left atrium is normal in size. Right Ventricle: The right ventricle is normal in size. There is normal right ventricular global systolic function. Right Atrium: The right atrium is normal in size. Aortic Valve: The aortic valve appears structurally normal. There is no evidence of aortic valve regurgitation. The peak instantaneous gradient of the aortic valve is 9.4 mmHg. The mean gradient of the aortic valve is 5.0 mmHg. Mitral Valve: The mitral valve is normal in structure. There is no evidence of mitral valve regurgitation. Tricuspid Valve: The tricuspid valve is structurally normal. No evidence of tricuspid regurgitation. The right ventricular systolic pressure is unable to be estimated. Pulmonic Valve: The pulmonic valve is structurally normal. There is no indication of pulmonic valve regurgitation. Pericardium: There is no pericardial effusion noted. Aorta: The aortic root is normal.  CONCLUSIONS:  1. Left ventricular systolic function is hyperdynamic with a 70-75% estimated ejection fraction. QUANTITATIVE DATA SUMMARY: M-MODE MEASUREMENTS:                  Normal Ranges: Ao Root: 2.40 cm (2.0-3.7cm) LAs:     3.60 cm  (2.7-4.0cm) AORTIC VALVE:                                   Normal Ranges: AoV Vmax:                1.53 m/s (<=1.7m/s) AoV Peak P.4 mmHg (<20mmHg) AoV Mean P.0 mmHg (1.7-11.5mmHg) LVOT Max Mike:            1.23 m/s (<=1.1m/s) AoV VTI:                 23.60 cm (18-25cm) LVOT VTI:                19.70 cm LVOT Diameter:           2.20 cm  (1.8-2.4cm) AoV Area, VTI:           3.17 cm2 (2.5-5.5cm2) AoV Area,Vmax:           3.06 cm2 (2.5-4.5cm2) AoV Dimensionless Index: 0.83 PULMONIC VALVE:                         Normal Ranges: PV Accel Time: 98 msec  (>120ms) PV Max Mike:    1.4 m/s  (0.6-0.9m/s) PV Max P.5 mmHg  [id number] Earl Romano DO Electronically signed on 2024 at 5:31:10 PM  ** Final **     ECG 12 Lead    Result Date: 2024  Sinus tachycardia Otherwise normal ECG When compared with ECG of 01-MAY-2024 03:01, No significant change was found Confirmed by Earl Romano ([id number]) on 2024 10:59:53 AM    XR chest 1 view    Result Date: 2024  Interpreted By:  Isaias Wing, STUDY: XR CHEST 1 VIEW;  2024 3:15 am   INDICATION: Signs/Symptoms:sob.   COMPARISON: 2024   ACCESSION NUMBER(S): [id number]   ORDERING CLINICIAN: MANJINDER ODONNELL   FINDINGS:     The cardiomediastinal silhouette and pulmonary vasculature are within normal limits. No consolidation, pleural effusion or pneumothorax.         No acute cardiopulmonary process.     MACRO: None.   Signed by: Isaias Wing 2024 3:35 AM Dictation workstation:   [id number]    CT abdomen pelvis w IV contrast    Result Date: 2024  Interpreted By:  Julianna Tolliver, STUDY: CT ABDOMEN PELVIS W IV CONTRAST;  2024 3:03 pm   INDICATION: Signs/Symptoms:abdominal pain, hematuria, bandemia.   COMPARISON: None.   ACCESSION NUMBER(S): [id number]   ORDERING CLINICIAN: VIDHYA WYATT   TECHNIQUE: CT of the abdomen and pelvis was performed.  75 mL Omnipaque 350   FINDINGS: LOWER CHEST: Images of the lung bases  show no infiltrate or pleural fluid. There is flame shaped tissue in the retroareolar region of the left breast, likely unilateral gynecomastia.   ABDOMEN:   LIVER: There is no hepatic mass. There is fatty infiltration of the liver.   BILE DUCTS: There is no intrahepatic, common hepatic or common bile ductal dilatation.   GALLBLADDER: The gallbladder is unremarkable.   PANCREAS: The pancreas is unremarkable.   SPLEEN: The spleen is unremarkable. There is no splenic mass or splenomegaly.   ADRENAL GLANDS: The adrenal glands are unremarkable.   KIDNEYS AND URETERS: The kidneys function symmetrically. The kidneys demonstrate no mass. There is no intrarenal calculus or hydronephrosis. There is a Godoy catheter insufflated in the bladder. There is hyperdense fluid in the bladder. This is consistent with blood. This could be secondary to hemorrhagic cystitis on or iatrogenic from Godoy catheter placement.   BOWEL: There is no bowel wall thickening, dilatation or obstruction. The appendix is normal.   VESSELS: The abdominal and pelvic vessels are unremarkable.   PERITONEUM/RETROPERITONEUM/LYMPH NODES: There is no retroperitoneal or pelvic adenopathy.  There is no ascites.   ABDOMINAL WALL: The abdominal wall is unremarkable.   BONE AND SOFT TISSUE: There is no acute osseous finding. Status post partial resection spinous process L4   There is no soft tissue abnormality.       1. Godoy catheter insufflated in the bladder. There is hyperdense material in the bladder lumen. This is either iatrogenic from catheter placement or hemorrhagic cystitis. 2. Fatty infiltration of the liver.   MACRO: None   Signed by: Julianna Tolliver 6/4/2024 3:32 PM Dictation workstation:   [id number]              Assessment/Plan   Principal Problem:    E coli bacteremia  Active Problems:    Morbid obesity (Multi)    Septic shock (Multi)    Gross hematuria    Gram negative sepsis (Multi)  S/p Lumbar laminectomy for congenital lumbar stenosis with  cauda equina syndrome      Sepsis secondary to E coli bacteremia - overnight patient had fever of 104.3 which is now resolved. Patient on IV merepenem and vancomycin. Lactate 2.0. WBC came down to 7.9. Platelet count dropped 247 -> 143.   Morbid obesity (Multi)  Gross hematuria - CT of pelvis (6/4) showed castaneda catheter insufflated in bladder with hyperdense material in bladder lumen consistent with blood. Blood culture indicates gram negative bacilli.   S/p Lumbar laminectomy for congenital lumbar stenosis with cauda equina syndrome - recommending repeat lumbar imaging. Unable to visualize wound VAC. Denies new onset-pain             Fili Tubbs

## 2024-06-06 NOTE — CARE PLAN
The patient's goals for the shift include receive IV antibx    The clinical goals for the shift include Pt will remain hemodynamically stable for entire shift.      Problem: Pain - Adult  Goal: Verbalizes/displays adequate comfort level or baseline comfort level  Outcome: Progressing  Flowsheets (Taken 6/5/2024 2012)  Verbalizes/displays adequate comfort level or baseline comfort level:   Encourage patient to monitor pain and request assistance   Assess pain using appropriate pain scale   Implement non-pharmacological measures as appropriate and evaluate response   Notify Licensed Independent Practitioner if interventions unsuccessful or patient reports new pain   Consider cultural and social influences on pain and pain management     Problem: Discharge Planning  Goal: Discharge to home or other facility with appropriate resources  Outcome: Progressing  Flowsheets (Taken 6/5/2024 2012)  Discharge to home or other facility with appropriate resources:   Identify barriers to discharge with patient and caregiver   Arrange for needed discharge resources and transportation as appropriate   Identify discharge learning needs (meds, wound care, etc)   Arrange for interpreters to assist at discharge as needed   Refer to discharge planning if patient needs post-hospital services based on physician order or complex needs related to functional status, cognitive ability or social support system     Problem: Chronic Conditions and Co-morbidities  Goal: Patient's chronic conditions and co-morbidity symptoms are monitored and maintained or improved  Outcome: Progressing  Flowsheets (Taken 6/5/2024 2012)  Care Plan - Patient's Chronic Conditions and Co-Morbidity Symptoms are Monitored and Maintained or Improved:   Monitor and assess patient's chronic conditions and comorbid symptoms for stability, deterioration, or improvement   Collaborate with multidisciplinary team to address chronic and comorbid conditions and prevent  exacerbation or deterioration   Update acute care plan with appropriate goals if chronic or comorbid symptoms are exacerbated and prevent overall improvement and discharge     Problem: Skin  Goal: Participates in plan/prevention/treatment measures  Outcome: Progressing  Flowsheets (Taken 6/5/2024 2012)  Participates in plan/prevention/treatment measures:   Discuss with provider PT/OT consult   Increase activity/out of bed for meals   Elevate heels  Goal: Prevent/manage excess moisture  Outcome: Progressing  Flowsheets (Taken 6/5/2024 2012)  Prevent/manage excess moisture:   Use wicking fabric (obtain order)   Cleanse incontinence/protect with barrier cream   Monitor for/manage infection if present   Follow provider orders for dressing changes   Moisturize dry skin  Goal: Prevent/minimize sheer/friction injuries  Outcome: Progressing  Flowsheets (Taken 6/5/2024 2012)  Prevent/minimize sheer/friction injuries:   Increase activity/out of bed for meals   Use pull sheet   HOB 30 degrees or less   Turn/reposition every 2 hours/use positioning/transfer devices   Utilize specialty bed per algorithm  Goal: Promote/optimize nutrition  Outcome: Progressing  Flowsheets (Taken 6/5/2024 2012)  Promote/optimize nutrition:   Assist with feeding   Monitor/record intake including meals   Reassess MST if dietician not consulted   Discuss with provider if NPO > 2 days   Offer water/supplements/favorite foods   Consume > 50% meals/supplements  Goal: Promote skin healing  Outcome: Progressing  Flowsheets (Taken 6/5/2024 2012)  Promote skin healing:   Assess skin/pad under line(s)/device(s)   Protective dressings over bony prominences   Turn/reposition every 2 hours/use positioning/transfer devices   Ensure correct size (line/device) and apply per  instructions   Rotate device position/do not position patient on device     Problem: Fall/Injury  Goal: Not fall by end of shift  Outcome: Progressing  Goal: Be free from injury by  end of the shift  Outcome: Progressing  Goal: Verbalize understanding of personal risk factors for fall in the hospital  Outcome: Progressing  Goal: Verbalize understanding of risk factor reduction measures to prevent injury from fall in the home  Outcome: Progressing  Goal: Use assistive devices by end of the shift  Outcome: Progressing  Goal: Pace activities to prevent fatigue by end of the shift  Outcome: Progressing

## 2024-06-06 NOTE — PROGRESS NOTES
Erwin Cline is a 24 y.o. male on day 2 of admission presenting with E coli bacteremia.    Subjective   Interval History:   Spiked fever overnight, tachycardic  Denies back pain  No shortness of breath cough or chest pain  On room air  Discussed with nursing-had bowel movement yesterday  Interval discussion with primary team   Reports constipation, rectal, pelvis discomfort          Objective   Range of Vitals (last 24 hours)  Heart Rate:  [101-126]   Temp:  [36.5 °C (97.7 °F)-40.2 °C (104.3 °F)]   Resp:  [17-33]   BP: (101-140)/(45-93)   Weight:  [200 kg (440 lb)]   SpO2:  [91 %-100 %]   Daily Weight  06/05/24 : (!) 200 kg (440 lb)    Body mass index is 55 kg/m².    Physical Exam  Constitutional:       Appearance: Normal appearance.   HENT:      Head: Normocephalic and atraumatic.      Nose: Nose normal.      Mouth/Throat:      Mouth: Mucous membranes are moist.      Pharynx: Oropharynx is clear.   Eyes:      Conjunctiva/sclera: Conjunctivae normal.   Cardiovascular:      Rate and Rhythm: Regular rhythm. Tachycardia present.   Pulmonary:      Effort: Pulmonary effort is normal.      Breath sounds: Normal breath sounds.   Abdominal:      General: Bowel sounds are normal.      Palpations: Abdomen is soft.   Genitourinary: castaneda catheter   Musculoskeletal:         General: Normal range of motion.      Cervical back: Normal range of motion and neck supple.   Skin:     General: Skin is warm and dry.      Comments: Healed surgical incision    Neurological:      Mental Status: He is alert.      Comments: Awake, alert   Psychiatric:         Mood and Affect: Mood normal.         Behavior: Behavior normal.     Antibiotics  cefTRIAXone (Rocephin) IVPB 1 g  sodium chloride 0.9 % bolus 1,000 mL  acetaminophen (Tylenol) tablet 975 mg  ketorolac (Toradol) injection 15 mg  ondansetron (Zofran) injection 4 mg  iohexol (OMNIPaque) 350 mg iodine/mL solution 75 mL  sodium chloride 0.9 % bolus 1,000 mL  sodium chloride 0.9 %  bolus 1,000 mL  vancomycin 1.5 g in 300 mL (Xellia) IVPB 1.5 g  tobramycin (Nebcin) 700 mg in dextrose 5% 100 mL IV  cefTRIAXone (Rocephin) IVPB 1 g  lactated Ringer's bolus 1,000 mL  docusate sodium (Colace) capsule 100 mg  enoxaparin (Lovenox) syringe 40 mg  methocarbamol (Robaxin) tablet 500 mg  polyethylene glycol (Glycolax, Miralax) packet 17 g  enoxaparin (Lovenox) syringe 40 mg  polyethylene glycol (Glycolax, Miralax) packet 17 g  cefTRIAXone (Rocephin) 2 g in dextrose 5% in water (D5W) 50 mL  acetaminophen (Tylenol) tablet 975 mg  meropenem (Merrem) 1 g IV in sodium chloride 0.9% 50 mL  vancomycin (Vancocin) pharmacy to dose - pharmacy monitoring  sodium chloride 0.9 % bolus 1,000 mL  vancomycin-diluent combo no.1 (Xellia) IVPB 2 g  sodium chloride 0.9% infusion  ibuprofen tablet 800 mg  norepinephrine (Levophed) 8 mg in dextrose 5% 250 mL (0.032 mg/mL) infusion (premix)  ketorolac (Toradol) injection 15 mg  lidocaine (Xylocaine) 10 mg/mL (1 %) injection 50 mg  alteplase (Cathflo Activase) injection 2 mg  meropenem (Merrem) in sodium chloride 0.9 % 100 mL IV 2 g  dextrose 5 % and lactated Ringer's with KCl 20 mEq/L infusion  acetaminophen (Tylenol) tablet 650 mg  perflutren lipid microspheres (Definity) injection 0.5-10 mL of dilution  sulfur hexafluoride microsphr (Lumason) injection 24.28 mg  perflutren protein A microsphere (Optison) injection 0.5 mL      Relevant Results  Labs  Results from last 72 hours   Lab Units 06/06/24  0616 06/05/24  0517 06/04/24  2324 06/04/24  1305   WBC AUTO x10*3/uL 7.9 25.4* 21.2* 4.7   HEMOGLOBIN g/dL 10.7* 11.8* 11.1* 14.2   HEMATOCRIT % 32.9* 37.1* 34.0* 44.4   PLATELETS AUTO x10*3/uL 143* 247 239 375   NEUTROS PCT AUTO % 82.2 95.2  --   --    LYMPHO PCT MAN %  --   --   --  26.0   LYMPHS PCT AUTO % 10.0 1.1  --   --    MONO PCT MAN %  --   --   --  3.0   MONOS PCT AUTO % 6.1 2.3  --   --    EOSINO PCT MAN %  --   --   --  6.0   EOS PCT AUTO % 0.5 0.0  --   --   "    Results from last 72 hours   Lab Units 06/06/24  0616 06/05/24  0517 06/04/24  2324   SODIUM mmol/L 138 138 136   POTASSIUM mmol/L 3.6 4.5 3.8   CHLORIDE mmol/L 106 105 103   CO2 mmol/L 22* 20* 20*   BUN mg/dL 12 23 19   CREATININE mg/dL 0.70 1.30 1.40   GLUCOSE mg/dL 98 103* 100*   CALCIUM mg/dL 8.3* 8.1* 8.4*   ANION GAP mmol/L 10 13 13   EGFR mL/min/1.73m*2 >90 79 72     Results from last 72 hours   Lab Units 06/06/24  0616 06/05/24  0517 06/04/24  2324   ALK PHOS U/L 88 65 67   BILIRUBIN TOTAL mg/dL 1.6* 1.4* 0.9   PROTEIN TOTAL g/dL 6.2 6.2 5.9   ALT U/L 154* 157* 166*   AST U/L 84* 56* 65*   ALBUMIN g/dL 3.0* 3.3* 3.3*     Estimated Creatinine Clearance: 125 mL/min (by C-G formula based on SCr of 0.7 mg/dL).  No results found for: \"CRP\"  Microbiology  Susceptibility data from last 14 days.  Collected Specimen Info Organism Amikacin Amoxicillin/Clavulanate Ampicillin Ampicillin/Sulbactam Cefazolin Ceftriaxone Cefuroxime Ciprofloxacin Gentamicin Levofloxacin Piperacillin/Tazobactam Tobramycin   06/04/24 Blood culture from Peripheral Venipuncture Escherichia coli               06/04/24 Blood culture from Peripheral Venipuncture Escherichia coli  S  S  R  R  I  S  S  R  R  R  S  R     Collected Specimen Info Organism Trimethoprim/Sulfamethoxazole   06/04/24 Blood culture from Peripheral Venipuncture Escherichia coli    06/04/24 Blood culture from Peripheral Venipuncture Escherichia coli  S       Imaging  Transthoracic Echo (TTE) Complete    Result Date: 6/5/2024            [address], [place] [id number]             Phone [phone] TRANSTHORACIC ECHOCARDIOGRAM REPORT  Patient Name:      ABDIEL JACOB      Tyrell Physician:    [id number] Earl Romano DO Study Date:        6/5/2024             Ordering Provider:    [id number] LISSETTE HUMPHREY MRN/PID:      "      [id number]             Fellow: Accession#:        [id number]         Nurse: Date of Birth/Age: 2000 / 24 years Sonographer:          Izabel Olmos RDCS Gender:            M                    Additional Staff: Height:            190.50 cm            Admit Date: Weight:            198.68 kg            Admission Status:     Inpatient -                                                               Routine BSA / BMI:         3.06 m2 / 54.75      Department Location:  [facility]                    kg/m2 Blood Pressure: 118 /81 mmHg Study Type:    TRANSTHORACIC ECHO (TTE) COMPLETE Diagnosis/ICD: Sepsis, unspecified organism-A41.9 Indication:    Severe sepsis CPT Codes:     Echo Complete w Full Doppler-[id number] Patient History: Pertinent History: No cardiac Hx. Study Detail: The following Echo studies were performed: 2D, M-Mode, Doppler and               color flow. Image quality for this study is fair. Technically               challenging study due to poor acoustic windows and body habitus.               Definity used as a contrast agent for endocardial border               definition. Total contrast used for this procedure was 2 mL via IV               push.  PHYSICIAN INTERPRETATION: Left Ventricle: Left ventricular systolic function is hyperdynamic, with an estimated ejection fraction of 70-75%. There are no regional wall motion abnormalities. The left ventricular cavity size is normal. Spectral Doppler shows a normal pattern of left ventricular diastolic filling. Left Atrium: The left atrium is normal in size. Right Ventricle: The right ventricle is normal in size. There is normal right ventricular global systolic function. Right Atrium: The right atrium is normal in size. Aortic Valve: The aortic valve appears structurally normal. There is no evidence of aortic valve regurgitation. The peak instantaneous gradient of the aortic valve is 9.4 mmHg.  The mean gradient of the aortic valve is 5.0 mmHg. Mitral Valve: The mitral valve is normal in structure. There is no evidence of mitral valve regurgitation. Tricuspid Valve: The tricuspid valve is structurally normal. No evidence of tricuspid regurgitation. The right ventricular systolic pressure is unable to be estimated. Pulmonic Valve: The pulmonic valve is structurally normal. There is no indication of pulmonic valve regurgitation. Pericardium: There is no pericardial effusion noted. Aorta: The aortic root is normal.  CONCLUSIONS:  1. Left ventricular systolic function is hyperdynamic with a 70-75% estimated ejection fraction. QUANTITATIVE DATA SUMMARY: M-MODE MEASUREMENTS:                  Normal Ranges: Ao Root: 2.40 cm (2.0-3.7cm) LAs:     3.60 cm (2.7-4.0cm) AORTIC VALVE:                                   Normal Ranges: AoV Vmax:                1.53 m/s (<=1.7m/s) AoV Peak P.4 mmHg (<20mmHg) AoV Mean P.0 mmHg (1.7-11.5mmHg) LVOT Max Mike:            1.23 m/s (<=1.1m/s) AoV VTI:                 23.60 cm (18-25cm) LVOT VTI:                19.70 cm LVOT Diameter:           2.20 cm  (1.8-2.4cm) AoV Area, VTI:           3.17 cm2 (2.5-5.5cm2) AoV Area,Vmax:           3.06 cm2 (2.5-4.5cm2) AoV Dimensionless Index: 0.83 PULMONIC VALVE:                         Normal Ranges: PV Accel Time: 98 msec  (>120ms) PV Max Mike:    1.4 m/s  (0.6-0.9m/s) PV Max P.5 mmHg  [id number] Earl Romano DO Electronically signed on 2024 at 5:31:10 PM  ** Final **     ECG 12 Lead    Result Date: 2024  Sinus tachycardia Otherwise normal ECG When compared with ECG of 01-MAY-2024 03:01, No significant change was found Confirmed by Earl Romano ([id number]) on 2024 10:59:53 AM    XR chest 1 view    Result Date: 2024  Interpreted By:  Isaias Wing, STUDY: XR CHEST 1 VIEW;  2024 3:15 am   INDICATION: Signs/Symptoms:sob.   COMPARISON: 2024   ACCESSION NUMBER(S): [id number]   ORDERING  CLINICIAN: MANJINDER ODONNELL   FINDINGS:     The cardiomediastinal silhouette and pulmonary vasculature are within normal limits. No consolidation, pleural effusion or pneumothorax.         No acute cardiopulmonary process.     MACRO: None.   Signed by: Isaias Wing 6/5/2024 3:35 AM Dictation workstation:   [id number]    CT abdomen pelvis w IV contrast    Result Date: 6/4/2024  Interpreted By:  Julianna Tolliver, STUDY: CT ABDOMEN PELVIS W IV CONTRAST;  6/4/2024 3:03 pm   INDICATION: Signs/Symptoms:abdominal pain, hematuria, bandemia.   COMPARISON: None.   ACCESSION NUMBER(S): [id number]   ORDERING CLINICIAN: VIDHYA WYATT   TECHNIQUE: CT of the abdomen and pelvis was performed.  75 mL Omnipaque 350   FINDINGS: LOWER CHEST: Images of the lung bases show no infiltrate or pleural fluid. There is flame shaped tissue in the retroareolar region of the left breast, likely unilateral gynecomastia.   ABDOMEN:   LIVER: There is no hepatic mass. There is fatty infiltration of the liver.   BILE DUCTS: There is no intrahepatic, common hepatic or common bile ductal dilatation.   GALLBLADDER: The gallbladder is unremarkable.   PANCREAS: The pancreas is unremarkable.   SPLEEN: The spleen is unremarkable. There is no splenic mass or splenomegaly.   ADRENAL GLANDS: The adrenal glands are unremarkable.   KIDNEYS AND URETERS: The kidneys function symmetrically. The kidneys demonstrate no mass. There is no intrarenal calculus or hydronephrosis. There is a Godoy catheter insufflated in the bladder. There is hyperdense fluid in the bladder. This is consistent with blood. This could be secondary to hemorrhagic cystitis on or iatrogenic from Godoy catheter placement.   BOWEL: There is no bowel wall thickening, dilatation or obstruction. The appendix is normal.   VESSELS: The abdominal and pelvic vessels are unremarkable.   PERITONEUM/RETROPERITONEUM/LYMPH NODES: There is no retroperitoneal or pelvic adenopathy.  There is no ascites.    ABDOMINAL WALL: The abdominal wall is unremarkable.   BONE AND SOFT TISSUE: There is no acute osseous finding. Status post partial resection spinous process L4   There is no soft tissue abnormality.       1. Castaneda catheter insufflated in the bladder. There is hyperdense material in the bladder lumen. This is either iatrogenic from catheter placement or hemorrhagic cystitis. 2. Fatty infiltration of the liver.   MACRO: None   Signed by: Julianna Tolliver 6/4/2024 3:32 PM Dictation workstation:   [id number]        This patient currently has cardiac telemetry ordered; if you would like to modify or discontinue the telemetry order, click here to go to the orders activity to modify/discontinue the order.  Assessment/Plan   Sepsis secondary to gram negative bacteremia  Acute kidney injury, resolving  Fever, secondary to below  E. Coli bacteremia likely secondary to UTI  Hematuria, likely secondary to UTI, castaneda catheter   Pyuria versus urinary tract infection  Status post Recent spine surgery  Leukocytosis, resolved     IV meropenem  Pan culture  Consider addition IV vancomycin if clinically worsens  Consider MRI spine if clinical condition worsens  Monitor renal function  Follow blood culture  Follow urine culture  Monitor temperature and WBC  Consider Orthopedic spine  surgery consult     Total time spent caring for the patient today was 35 minutes. This includes time spent before the visit reviewing the chart, time spent during the visit, and time spent after the visit on documentation     MARCELINO Bermudez-CNP

## 2024-06-06 NOTE — CARE PLAN
The patient's goals for the shift include get some rest. States did not sleep well last night.    The clinical goals for the shift include keep patient safe while transferring from bed to chair.

## 2024-06-07 ENCOUNTER — APPOINTMENT (OUTPATIENT)
Dept: RADIOLOGY | Facility: HOSPITAL | Age: 24
DRG: 871 | End: 2024-06-07
Payer: COMMERCIAL

## 2024-06-07 LAB
ALBUMIN SERPL-MCNC: 3.1 G/DL (ref 3.5–5)
ALP BLD-CCNC: 104 U/L (ref 35–125)
ALT SERPL-CCNC: 154 U/L (ref 5–40)
ANION GAP SERPL CALC-SCNC: 9 MMOL/L
AST SERPL-CCNC: 78 U/L (ref 5–40)
BACTERIA BLD AEROBE CULT: ABNORMAL
BACTERIA BLD AEROBE CULT: ABNORMAL
BACTERIA BLD CULT: ABNORMAL
BACTERIA BLD CULT: ABNORMAL
BACTERIA UR CULT: NO GROWTH
BASOPHILS # BLD AUTO: 0.01 X10*3/UL (ref 0–0.1)
BASOPHILS NFR BLD AUTO: 0.2 %
BILIRUB SERPL-MCNC: 0.9 MG/DL (ref 0.1–1.2)
BUN SERPL-MCNC: 7 MG/DL (ref 8–25)
CALCIUM SERPL-MCNC: 8.5 MG/DL (ref 8.5–10.4)
CHLORIDE SERPL-SCNC: 106 MMOL/L (ref 97–107)
CO2 SERPL-SCNC: 26 MMOL/L (ref 24–31)
CREAT SERPL-MCNC: 0.6 MG/DL (ref 0.4–1.6)
EGFRCR SERPLBLD CKD-EPI 2021: >90 ML/MIN/1.73M*2
EOSINOPHIL # BLD AUTO: 0.17 X10*3/UL (ref 0–0.7)
EOSINOPHIL NFR BLD AUTO: 2.9 %
ERYTHROCYTE [DISTWIDTH] IN BLOOD BY AUTOMATED COUNT: 14.9 % (ref 11.5–14.5)
GLUCOSE SERPL-MCNC: 108 MG/DL (ref 65–99)
GRAM STN SPEC: ABNORMAL
GRAM STN SPEC: ABNORMAL
HCT VFR BLD AUTO: 34.2 % (ref 41–52)
HGB BLD-MCNC: 10.9 G/DL (ref 13.5–17.5)
IMM GRANULOCYTES # BLD AUTO: 0.02 X10*3/UL (ref 0–0.7)
IMM GRANULOCYTES NFR BLD AUTO: 0.3 % (ref 0–0.9)
LYMPHOCYTES # BLD AUTO: 1.22 X10*3/UL (ref 1.2–4.8)
LYMPHOCYTES NFR BLD AUTO: 20.6 %
MAGNESIUM SERPL-MCNC: 1.8 MG/DL (ref 1.6–3.1)
MCH RBC QN AUTO: 27.2 PG (ref 26–34)
MCHC RBC AUTO-ENTMCNC: 31.9 G/DL (ref 32–36)
MCV RBC AUTO: 85 FL (ref 80–100)
MONOCYTES # BLD AUTO: 0.54 X10*3/UL (ref 0.1–1)
MONOCYTES NFR BLD AUTO: 9.1 %
NEUTROPHILS # BLD AUTO: 3.96 X10*3/UL (ref 1.2–7.7)
NEUTROPHILS NFR BLD AUTO: 66.9 %
NRBC BLD-RTO: 0 /100 WBCS (ref 0–0)
PLATELET # BLD AUTO: 138 X10*3/UL (ref 150–450)
POTASSIUM SERPL-SCNC: 3.9 MMOL/L (ref 3.4–5.1)
PROT SERPL-MCNC: 6.2 G/DL (ref 5.9–7.9)
RBC # BLD AUTO: 4.01 X10*6/UL (ref 4.5–5.9)
SODIUM SERPL-SCNC: 141 MMOL/L (ref 133–145)
WBC # BLD AUTO: 5.9 X10*3/UL (ref 4.4–11.3)

## 2024-06-07 PROCEDURE — 2500000001 HC RX 250 WO HCPCS SELF ADMINISTERED DRUGS (ALT 637 FOR MEDICARE OP): Performed by: NURSE PRACTITIONER

## 2024-06-07 PROCEDURE — 83735 ASSAY OF MAGNESIUM: CPT | Performed by: INTERNAL MEDICINE

## 2024-06-07 PROCEDURE — 97116 GAIT TRAINING THERAPY: CPT | Mod: GP

## 2024-06-07 PROCEDURE — 72131 CT LUMBAR SPINE W/O DYE: CPT | Performed by: STUDENT IN AN ORGANIZED HEALTH CARE EDUCATION/TRAINING PROGRAM

## 2024-06-07 PROCEDURE — 72131 CT LUMBAR SPINE W/O DYE: CPT

## 2024-06-07 PROCEDURE — 36415 COLL VENOUS BLD VENIPUNCTURE: CPT | Performed by: INTERNAL MEDICINE

## 2024-06-07 PROCEDURE — 85025 COMPLETE CBC W/AUTO DIFF WBC: CPT | Performed by: INTERNAL MEDICINE

## 2024-06-07 PROCEDURE — 2500000004 HC RX 250 GENERAL PHARMACY W/ HCPCS (ALT 636 FOR OP/ED): Performed by: INTERNAL MEDICINE

## 2024-06-07 PROCEDURE — 1100000001 HC PRIVATE ROOM DAILY

## 2024-06-07 PROCEDURE — 80053 COMPREHEN METABOLIC PANEL: CPT | Performed by: INTERNAL MEDICINE

## 2024-06-07 PROCEDURE — 97110 THERAPEUTIC EXERCISES: CPT | Mod: GP

## 2024-06-07 RX ADMIN — DOCUSATE SODIUM 100 MG: 100 CAPSULE, LIQUID FILLED ORAL at 20:11

## 2024-06-07 RX ADMIN — MEROPENEM 2 G: 2 INJECTION, POWDER, FOR SOLUTION INTRAVENOUS at 18:32

## 2024-06-07 RX ADMIN — DOCUSATE SODIUM 100 MG: 100 CAPSULE, LIQUID FILLED ORAL at 09:48

## 2024-06-07 RX ADMIN — MEROPENEM 2 G: 2 INJECTION, POWDER, FOR SOLUTION INTRAVENOUS at 01:55

## 2024-06-07 RX ADMIN — DEXTROSE MONOHYDRATE, SODIUM CHLORIDE, SODIUM LACTATE, POTASSIUM CHLORIDE, CALCIUM CHLORIDE 75 ML/HR: 5; 600; 310; 179; 20 INJECTION, SOLUTION INTRAVENOUS at 08:17

## 2024-06-07 RX ADMIN — ENOXAPARIN SODIUM 40 MG: 40 INJECTION SUBCUTANEOUS at 05:54

## 2024-06-07 RX ADMIN — MEROPENEM 2 G: 2 INJECTION, POWDER, FOR SOLUTION INTRAVENOUS at 09:49

## 2024-06-07 ASSESSMENT — COGNITIVE AND FUNCTIONAL STATUS - GENERAL
STANDING UP FROM CHAIR USING ARMS: A LITTLE
HELP NEEDED FOR BATHING: A LOT
CLIMB 3 TO 5 STEPS WITH RAILING: A LITTLE
MOVING TO AND FROM BED TO CHAIR: A LITTLE
DRESSING REGULAR UPPER BODY CLOTHING: A LITTLE
MOVING FROM LYING ON BACK TO SITTING ON SIDE OF FLAT BED WITH BEDRAILS: A LITTLE
TURNING FROM BACK TO SIDE WHILE IN FLAT BAD: A LITTLE
STANDING UP FROM CHAIR USING ARMS: A LITTLE
DRESSING REGULAR UPPER BODY CLOTHING: A LITTLE
DRESSING REGULAR LOWER BODY CLOTHING: A LOT
WALKING IN HOSPITAL ROOM: A LITTLE
TOILETING: TOTAL
MOVING FROM LYING ON BACK TO SITTING ON SIDE OF FLAT BED WITH BEDRAILS: A LITTLE
DAILY ACTIVITIY SCORE: 16
HELP NEEDED FOR BATHING: A LOT
MOVING TO AND FROM BED TO CHAIR: A LITTLE
CLIMB 3 TO 5 STEPS WITH RAILING: A LITTLE
TURNING FROM BACK TO SIDE WHILE IN FLAT BAD: A LITTLE
WALKING IN HOSPITAL ROOM: A LITTLE
MOBILITY SCORE: 18
DAILY ACTIVITIY SCORE: 16
MOBILITY SCORE: 18
TOILETING: TOTAL
DRESSING REGULAR LOWER BODY CLOTHING: A LOT

## 2024-06-07 ASSESSMENT — PAIN - FUNCTIONAL ASSESSMENT
PAIN_FUNCTIONAL_ASSESSMENT: 0-10
PAIN_FUNCTIONAL_ASSESSMENT: 0-10

## 2024-06-07 ASSESSMENT — PAIN SCALES - GENERAL
PAINLEVEL_OUTOF10: 0 - NO PAIN

## 2024-06-07 ASSESSMENT — ENCOUNTER SYMPTOMS
AGITATION: 0
HALLUCINATIONS: 0
NERVOUS/ANXIOUS: 0
DYSPHORIC MOOD: 0
HYPERACTIVE: 0
CONFUSION: 0

## 2024-06-07 NOTE — PROGRESS NOTES
"Erwin Cline is a 24 y.o. male on day 3 of admission presenting with E coli bacteremia.    Subjective   Patient awake, alert on assessment.  He states he is continuing to feel better, slept well last night without use of trazodone.  He continues to deny feeling depressed or anxious, states that when he was first admitted he was having some anxiety related to bills, living arrangements, etc; however after speaking with his parents they have come up with a plan.  Appetite is good.  He understands the medical plan of care.  He denies thoughts of harm to self or others, denies symptoms of psychosis.         Objective     Last Recorded Vitals  Blood pressure 126/77, pulse 90, temperature 36.2 °C (97.2 °F), temperature source Temporal, resp. rate 22, height 1.905 m (6' 3\"), weight (!) 204 kg (449 lb), SpO2 96%.    Review of Systems   Psychiatric/Behavioral:  Negative for agitation, behavioral problems, confusion, dysphoric mood, hallucinations and suicidal ideas. The patient is not nervous/anxious and is not hyperactive.        Physical Exam    Mental Status Exam  General: 23 y/o male sitting up in bed watching television. NAD  Appearance: Appeared as age stated; obese, appropriately dressed/groomed.  Attitude: Pleasant and cooperative  Behavior: Fair EC; overall responding appropriately  Motor Activity: No notable topher PMAR  Speech: Clear, with fair phonation, and no lisp nor dysarthria.   Mood: \"pretty good\"  Affect:  mildly constricted, good eye contact  Thought Process: Linear and logical; not perseverating   Thought Content: Denied SI/HI. Not voicing/endorsing delusions.  Thought Perception: Did not appear to be responding to internal stimuli. Not endorsing AVH  Cognition: Grossly intact; A&O x4/4 to self, place, date, and context.  Insight: Fair  Judgement: intact    Psychiatric Risk Assessment  Violence Risk Factors:  male and stress/destabilizers  Acute Risk of Harm to Others is Considered: Low  Suicide " Risk Factors: male and ; /[place]  Protective Factors: positive family relationships and hopefulness/future-orientation  Acute Risk of Harm to Self is Considered: Low    Relevant Results  Results for orders placed or performed during the hospital encounter of 06/04/24 (from the past 24 hour(s))   CBC and Auto Differential   Result Value Ref Range    WBC 5.9 4.4 - 11.3 x10*3/uL    nRBC 0.0 0.0 - 0.0 /100 WBCs    RBC 4.01 (L) 4.50 - 5.90 x10*6/uL    Hemoglobin 10.9 (L) 13.5 - 17.5 g/dL    Hematocrit 34.2 (L) 41.0 - 52.0 %    MCV 85 80 - 100 fL    MCH 27.2 26.0 - 34.0 pg    MCHC 31.9 (L) 32.0 - 36.0 g/dL    RDW 14.9 (H) 11.5 - 14.5 %    Platelets 138 (L) 150 - 450 x10*3/uL    Neutrophils % 66.9 40.0 - 80.0 %    Immature Granulocytes %, Automated 0.3 0.0 - 0.9 %    Lymphocytes % 20.6 13.0 - 44.0 %    Monocytes % 9.1 2.0 - 10.0 %    Eosinophils % 2.9 0.0 - 6.0 %    Basophils % 0.2 0.0 - 2.0 %    Neutrophils Absolute 3.96 1.20 - 7.70 x10*3/uL    Immature Granulocytes Absolute, Automated 0.02 0.00 - 0.70 x10*3/uL    Lymphocytes Absolute 1.22 1.20 - 4.80 x10*3/uL    Monocytes Absolute 0.54 0.10 - 1.00 x10*3/uL    Eosinophils Absolute 0.17 0.00 - 0.70 x10*3/uL    Basophils Absolute 0.01 0.00 - 0.10 x10*3/uL   Magnesium   Result Value Ref Range    Magnesium 1.80 1.60 - 3.10 mg/dL   Comprehensive Metabolic Panel   Result Value Ref Range    Glucose 108 (H) 65 - 99 mg/dL    Sodium 141 133 - 145 mmol/L    Potassium 3.9 3.4 - 5.1 mmol/L    Chloride 106 97 - 107 mmol/L    Bicarbonate 26 24 - 31 mmol/L    Urea Nitrogen 7 (L) 8 - 25 mg/dL    Creatinine 0.60 0.40 - 1.60 mg/dL    eGFR >90 >60 mL/min/1.73m*2    Calcium 8.5 8.5 - 10.4 mg/dL    Albumin 3.1 (L) 3.5 - 5.0 g/dL    Alkaline Phosphatase 104 35 - 125 U/L    Total Protein 6.2 5.9 - 7.9 g/dL    AST 78 (H) 5 - 40 U/L    Bilirubin, Total 0.9 0.1 - 1.2 mg/dL     (H) 5 - 40 U/L    Anion Gap 9 <=19 mmol/L     Scheduled medications  acetaminophen,  975 mg, oral, Once  docusate sodium, 100 mg, oral, BID  enoxaparin, 40 mg, subcutaneous, Daily  lidocaine, 5 mL, infiltration, Once  meropenem, 2 g, intravenous, q8h      Continuous medications       PRN medications  PRN medications: acetaminophen, methocarbamol, polyethylene glycol, traZODone       Assessment/Plan   Principal Problem:    E coli bacteremia  Active Problems:    Morbid obesity (Multi)    Septic shock (Multi)    Gross hematuria    Gram negative sepsis (Multi)  ADHD  Autism spectrum disorder by history     PLAN/ RECOMMENDATIONS:      Recs:  Trazodone 50 mg at bedtime PRN for sleep  -Provided patient with resources for outpatient BH for symptoms of poor motivation, poor organization.      SAFETY  - Patient does not currently meet criteria for inpatient psychiatric admission.      -Thank you for allowing us to participate in the care of this patient.  Psychiatry will sign off.     I spent 25 minutes in the professional and overall care of this patient.      Kaci Richmond, APRN-CNP

## 2024-06-07 NOTE — PROGRESS NOTES
Physical Therapy    Physical Therapy Treatment    Patient Name: Erwin Cline  MRN: [id number]  Today's Date: 6/7/2024  Time Calculation  Start Time: 0848  Stop Time: 0916  Time Calculation (min): 28 min    Assessment/Plan   PT Assessment  Evaluation/Treatment Tolerance: Patient tolerated treatment well  Medical Staff Made Aware: Yes  End of Session Communication: Bedside nurse  Assessment Comment: Continues to demonstate impaired safe mobility warranting skilled PT care  End of Session Patient Position: Up in chair, Alarm on  PT Plan  Inpatient/Swing Bed or Outpatient: Inpatient  PT Plan  Treatment/Interventions: Bed mobility, Gait training, Transfer training, Balance training, Strengthening, Endurance training, Therapeutic exercise, Therapeutic activity, Home exercise program  PT Plan: Skilled PT  PT Frequency: 4 times per week  PT Discharge Recommendations: Low intensity level of continued care  Equipment Recommended upon Discharge: Wheeled walker  PT Recommended Transfer Status: Assist x1, Assistive device  PT - OK to Discharge: Yes      General Visit Information:   PT  Visit  PT Received On: 06/07/24  Response to Previous Treatment: Patient with no complaints from previous session.  General  Family/Caregiver Present: No  Prior to Session Communication: Bedside nurse  Patient Position Received: Bed, 4 rail up, Alarm off, not on at start of session  Preferred Learning Style: verbal  General Comment: Agreeable to PT follow    Subjective   Precautions:  Precautions  Medical Precautions: Fall precautions  Post-Surgical Precautions: Spinal precautions    Objective   Pain:  Pain Assessment  Pain Assessment: 0-10  Pain Score: 0 - No pain  Cognition:  Cognition  Overall Cognitive Status: Within Functional Limits  Cognition Comments: flat affect but good memory, can understand commands, and has a good understanding of education.    Activity Tolerance:  Activity Tolerance  Endurance: Decreased tolerance for upright  activites  Treatments:  Therapeutic Exercise  Therapeutic Exercise Performed: Yes  Therapeutic Exercise Activity 1: ankle pumps B x 10, knee extensions B x 10, Marches B x 10, resisted hip ABD and ADD B x 10. Pt educated on and completes above.    Bed Mobility  Bed Mobility: Yes  Bed Mobility 1  Bed Mobility 1: Supine to sitting, Log roll  Level of Assistance 1: Contact guard  Bed Mobility Comments 1: cues for log roll    Ambulation/Gait Training  Ambulation/Gait Training Performed: Yes  Ambulation/Gait Training 1  Surface 1: Level tile  Device 1: Rolling walker  Assistance 1: Contact guard (x2)  Quality of Gait 1: Decreased step length, Shuffling gait, Forward flexed posture (pushes device too far out limiting RONNA provided)  Comments/Distance (ft) 1: 15'  Ambulation/Gait Training 2  Surface 2: Level tile  Device 2: Rolling walker  Gait Support Devices: Wheelchair follow  Assistance 2: Contact guard (x1)  Quality of Gait 2: Decreased step length, Shuffling gait, Forward flexed posture (pushes device too far out resulting in decreased RONNA)  Comments/Distance (ft) 2: 25' (Pt demonstrated apprehension traveling this distance by requesting a chair follow)  Transfers  Transfer: Yes  Transfer 1  Transfer From 1: Bed to  Transfer to 1: Stand  Technique 1: Sit to stand, Stand to sit  Transfer Device 1: Walker  Transfer Level of Assistance 1: Contact guard  Trials/Comments 1: CGA for safety. No cues needed  Transfers 2  Transfer From 2: Chair with arms to  Transfer to 2: Stand  Technique 2: Sit to stand, Stand to sit  Transfer Device 2: Walker  Transfer Level of Assistance 2: Contact guard  Trials/Comments 2: 1 trial. CGA for safety. Pt is mildly unsteady but no LOB.    Education provided below    Outcome Measures:  [facility] Basic Mobility  Turning from your back to your side while in a flat bed without using bedrails: A little  Moving from lying on your back to sitting on the side of a flat bed without using bedrails: A  little  Moving to and from bed to chair (including a wheelchair): A little  Standing up from a chair using your arms (e.g. wheelchair or bedside chair): A little  To walk in hospital room: A little  Climbing 3-5 steps with railing: A little  Basic Mobility - Total Score: 18    Education Documentation  Precautions, taught by Eze Pang, PT at 6/7/2024 10:04 AM.  Learner: Patient  Readiness: Acceptance  Method: Explanation, Demonstration  Response: Verbalizes Understanding, Demonstrated Understanding  Comment: safe mobility techniques, HEP, spine precautions, log roll    Mobility Training, taught by Eze Pang, PT at 6/7/2024 10:04 AM.  Learner: Patient  Readiness: Acceptance  Method: Explanation, Demonstration  Response: Verbalizes Understanding, Demonstrated Understanding  Comment: safe mobility techniques, HEP, spine precautions, log roll    Education Comments  No comments found.        OP EDUCATION:       Encounter Problems       Encounter Problems (Active)       PT goals       Patient will transfer supine to sit and sit to supine with supervision assist to facilitate mobility.  (Progressing)       Start:  06/05/24    Expected End:  06/19/24            Patient will transfer bed to chair and chair to bed with supervision assist to facilitate mobility.  (Progressing)       Start:  06/05/24    Expected End:  06/19/24            Patient will amb 25 feet with rolling walker device including no turns on even surface with supervision assist to facilitate safe mobility.  (Progressing)       Start:  06/05/24    Expected End:  06/19/24            Patient will increase B LE strength to 3/5 to improve functional mobility.    (Progressing)       Start:  06/05/24    Expected End:  06/19/24               Pain - Adult

## 2024-06-07 NOTE — CARE PLAN
The patient's goals for the shift include receive IV antibx    The clinical goals for the shift include pt will demonstrate mobility and get out of bed and in chair    Over the shift, the patient did not make progress toward the following goals. Barriers to progression include   Problem: Pain - Adult  Goal: Verbalizes/displays adequate comfort level or baseline comfort level  Outcome: Progressing     Problem: Discharge Planning  Goal: Discharge to home or other facility with appropriate resources  Outcome: Progressing     Problem: Chronic Conditions and Co-morbidities  Goal: Patient's chronic conditions and co-morbidity symptoms are monitored and maintained or improved  Outcome: Progressing     Problem: Skin  Goal: Participates in plan/prevention/treatment measures  Outcome: Progressing  Goal: Prevent/manage excess moisture  Outcome: Progressing  Goal: Prevent/minimize sheer/friction injuries  Outcome: Progressing  Goal: Promote/optimize nutrition  Outcome: Progressing  Goal: Promote skin healing  Outcome: Progressing     Problem: Fall/Injury  Goal: Not fall by end of shift  Outcome: Progressing  Goal: Be free from injury by end of the shift  Outcome: Progressing  Goal: Verbalize understanding of personal risk factors for fall in the hospital  Outcome: Progressing  Goal: Verbalize understanding of risk factor reduction measures to prevent injury from fall in the home  Outcome: Progressing  Goal: Use assistive devices by end of the shift  Outcome: Progressing  Goal: Pace activities to prevent fatigue by end of the shift  Outcome: Progressing    Recommendations to address these barriers include

## 2024-06-07 NOTE — PROGRESS NOTES
Occupational Therapy    OT Treatment    Patient Name: Erwin Cline  MRN: [id number]  Today's Date: 6/7/2024  Time Calculation  Start Time: 1120  Stop Time: 1145  Time Calculation (min): 25 min         Assessment:  OT Assessment: Pt making good progress toward POC goals, declined up to chair as he was up in chair 2 hours, just got back to bed.  Barriers to Discharge: Other (Comment) (pt CGA/supervison with transfers, unable to walk household distance, needs chair follow with mobility.)  Evaluation/Treatment Tolerance: Patient tolerated treatment well  End of Session Patient Position: Bed, 3 rail up, Alarm off, not on at start of session (all needs in reach)  OT Assessment Results: Decreased ADL status, Decreased upper extremity strength, Decreased safe judgment during ADL, Decreased cognition, Decreased endurance, Decreased functional mobility  Barriers to Discharge: Other (Comment) (pt CGA/supervison with transfers, unable to walk household distance, needs chair follow with mobility.)  Evaluation/Treatment Tolerance: Patient tolerated treatment well  Plan:  Treatment Interventions: Functional transfer training, UE strengthening/ROM  OT Frequency: 4 times per week  OT Discharge Recommendations: Moderate intensity level of continued care  Equipment Recommended upon Discharge: Wheeled walker  OT Recommended Transfer Status: Assist of 1  OT - OK to Discharge: Yes  Treatment Interventions: Functional transfer training, UE strengthening/ROM    Subjective   Previous Visit Info:  OT Last Visit  OT Received On: 06/07/24  General:  General  Referred By: Dr. Geller  Past Medical History Relevant to Rehab: ADHD, asperger's, OCD, recent lumbar lami d/t cauda equina syndrome  Prior to Session Communication: Bedside nurse  Patient Position Received: Bed, 4 rail up, Alarm off, not on at start of session  General Comment: Agreeable to treatment.  Precautions:  Medical Precautions: Fall precautions  Post-Surgical  Precautions: Spinal precautions     Pain:  Pain Assessment  Pain Assessment: 0-10  Pain Score: 0 - No pain    Objective    Cognition:  Cognition  Overall Cognitive Status: Within Functional Limits     Activities of Daily Living: Grooming  Grooming Comments: completed earlier per pt    UE Bathing  UE Bathing Comments: bathing completed earlier per pt     Bed Mobility/Transfers: Bed Mobility 1  Bed Mobility 1: Supine to sitting  Level of Assistance 1: Close supervision  Bed Mobility Comments 1: bed flat, use of bedrail, additional time, effort needed  Bed Mobility 2  Bed Mobility  2: Sitting to supine  Level of Assistance 2: Close supervision  Bed Mobility Comments 2: increased effort needed ( consider mattress fully inflated)    Transfer 1  Transfer From 1: Bed to  Transfer to 1: Bed  Technique 1: Sit to stand, Stand to sit  Transfer Device 1: Walker  Transfer Level of Assistance 1: Contact guard  Trials/Comments 1: Pt takes ~ 6 side steps to HOB.  Therapy/Activity: Therapeutic Exercise  Therapeutic Exercise Activity 1: scap protraction/retraction  Therapeutic Exercise Activity 2: shoulder flexion  Therapeutic Exercise Activity 3: shoulder abd/add  Therapeutic Exercise Activity 4: 1 set x 15 reps, 2lb free wt. Pt actively participates, presents with fair form, cues for correct joint alignment.    Outcome Measures:[facility] Daily Activity  Putting on and taking off regular lower body clothing: A lot  Bathing (including washing, rinsing, drying): A lot  Putting on and taking off regular upper body clothing: A little  Toileting, which includes using toilet, bedpan or urinal: Total  Taking care of personal grooming such as brushing teeth: None  Eating Meals: None  Daily Activity - Total Score: 16        Education Documentation  Home Exercise Program, taught by OLIVIER Barrera at 6/7/2024 12:03 PM.  Learner: Patient  Readiness: Acceptance  Method: Explanation, Demonstration  Response: Verbalizes Understanding, Demonstrated  Understanding    ADL Training, taught by OLIVIER Barrera at 6/7/2024 12:03 PM.  Learner: Patient  Readiness: Acceptance  Method: Explanation, Demonstration  Response: Verbalizes Understanding, Demonstrated Understanding    Education Comments  No comments found.    IP EDUCATION:  Education  Individual(s) Educated: Patient  Education Provided:  (UE therex)  Patient Response to Education: Patient/Caregiver Verbalized Understanding of Information, Patient/Caregiver Performed Return Demonstration of Exercises/Activities    Goals:  Encounter Problems       Encounter Problems (Active)       OT Goals       ADLs (Progressing)       Start:  06/05/24    Expected End:  06/19/24       Patient will complete ADLS with MIN A using AE/compensatory techniques as needed.          Functional Transfers (Progressing)       Start:  06/05/24    Expected End:  06/19/24       Patient will complete functional transfers, including but not limited to: bed, chair, toilet transfers with MIN A using LRD.         UE Strengthening (Progressing)       Start:  06/05/24    Expected End:  06/19/24       Patient will improve UE strength to 4/5 in preparation for ADLs/transfers.

## 2024-06-07 NOTE — PROGRESS NOTES
"Erwin Cline is a 24 y.o. male on day 3 of admission presenting with E coli bacteremia.    Subjective   Patient was examined at bedside today. No acute events overnight and patient is afebrile this morning. Patient was trying to sleep at the time. Denies new onset pain, chills, shortness of breath, chest pain. Previous blood culture positive for E.Coli bacteremia and pt is on IV meropenem. Today's blood cultures and urine culture pending. Patient denies pelvic discomfort and constipation which he noted yesterday. Patient tolerated PT well today.        Objective     Physical Exam  Constitutional:       Appearance: Normal appearance. He is obese.   HENT:      Head: Normocephalic and atraumatic.      Mouth/Throat:      Mouth: Mucous membranes are moist.      Pharynx: Oropharynx is clear.   Eyes:      Extraocular Movements: Extraocular movements intact.      Pupils: Pupils are equal, round, and reactive to light.   Cardiovascular:      Rate and Rhythm: Normal rate and regular rhythm.      Pulses: Normal pulses.      Heart sounds: Normal heart sounds.   Pulmonary:      Effort: Pulmonary effort is normal.      Breath sounds: Normal breath sounds.   Abdominal:      General: Bowel sounds are normal.      Palpations: Abdomen is soft.   Musculoskeletal:         General: Normal range of motion.      Cervical back: Normal range of motion and neck supple.   Skin:     General: Skin is warm and dry.      Capillary Refill: Capillary refill takes less than 2 seconds.   Neurological:      General: No focal deficit present.      Mental Status: He is alert and oriented to person, place, and time.   Psychiatric:         Mood and Affect: Mood normal.         Behavior: Behavior normal.         Last Recorded Vitals  Blood pressure 126/77, pulse 90, temperature 36.2 °C (97.2 °F), temperature source Temporal, resp. rate 22, height 1.905 m (6' 3\"), weight (!) 204 kg (449 lb), SpO2 96%.  Intake/Output last 3 Shifts:  I/O last 3 " completed shifts:  In: 668.8 (3.3 mL/kg) [IV Piggyback:668.8]  Out: 6050 (29.7 mL/kg) [Urine:6050 (0.8 mL/kg/hr)]  Weight: 203.7 kg     Relevant Results    This patient currently has cardiac telemetry ordered; if you would like to modify or discontinue the telemetry order, click here to go to the orders activity to modify/discontinue the order.    This patient has a urinary catheter   Reason for the urinary catheter remaining today? urinary retention/bladder outlet obstruction, acute or chronic    Scheduled medications  acetaminophen, 975 mg, oral, Once  docusate sodium, 100 mg, oral, BID  enoxaparin, 40 mg, subcutaneous, Daily  lidocaine, 5 mL, infiltration, Once  meropenem, 2 g, intravenous, q8h      Continuous medications   dextrose 5% lactated Ringer's with KCl 20 mEq/L, 75 mL/hr, Last Rate: 75 mL/hr (06/07/24 0817)      PRN medications  PRN medications: acetaminophen, methocarbamol, polyethylene glycol, traZODone    Susceptibility data from last 90 days.  Collected Specimen Info Organism Amikacin Amoxicillin/Clavulanate Ampicillin Ampicillin/Sulbactam Cefazolin Ceftriaxone Cefuroxime Ciprofloxacin Gentamicin Levofloxacin Piperacillin/Tazobactam Tobramycin   06/04/24 Blood culture from Peripheral Venipuncture Escherichia coli               06/04/24 Blood culture from Peripheral Venipuncture Escherichia coli  S  S  R  R  I  S  S  R  R  R  S  R     Collected Specimen Info Organism Trimethoprim/Sulfamethoxazole   06/04/24 Blood culture from Peripheral Venipuncture Escherichia coli    06/04/24 Blood culture from Peripheral Venipuncture Escherichia coli  S      Results for orders placed or performed during the hospital encounter of 06/04/24 (from the past 24 hour(s))   CBC and Auto Differential   Result Value Ref Range    WBC 5.9 4.4 - 11.3 x10*3/uL    nRBC 0.0 0.0 - 0.0 /100 WBCs    RBC 4.01 (L) 4.50 - 5.90 x10*6/uL    Hemoglobin 10.9 (L) 13.5 - 17.5 g/dL    Hematocrit 34.2 (L) 41.0 - 52.0 %    MCV 85 80 - 100 fL     MCH 27.2 26.0 - 34.0 pg    MCHC 31.9 (L) 32.0 - 36.0 g/dL    RDW 14.9 (H) 11.5 - 14.5 %    Platelets 138 (L) 150 - 450 x10*3/uL    Neutrophils % 66.9 40.0 - 80.0 %    Immature Granulocytes %, Automated 0.3 0.0 - 0.9 %    Lymphocytes % 20.6 13.0 - 44.0 %    Monocytes % 9.1 2.0 - 10.0 %    Eosinophils % 2.9 0.0 - 6.0 %    Basophils % 0.2 0.0 - 2.0 %    Neutrophils Absolute 3.96 1.20 - 7.70 x10*3/uL    Immature Granulocytes Absolute, Automated 0.02 0.00 - 0.70 x10*3/uL    Lymphocytes Absolute 1.22 1.20 - 4.80 x10*3/uL    Monocytes Absolute 0.54 0.10 - 1.00 x10*3/uL    Eosinophils Absolute 0.17 0.00 - 0.70 x10*3/uL    Basophils Absolute 0.01 0.00 - 0.10 x10*3/uL   Magnesium   Result Value Ref Range    Magnesium 1.80 1.60 - 3.10 mg/dL   Comprehensive Metabolic Panel   Result Value Ref Range    Glucose 108 (H) 65 - 99 mg/dL    Sodium 141 133 - 145 mmol/L    Potassium 3.9 3.4 - 5.1 mmol/L    Chloride 106 97 - 107 mmol/L    Bicarbonate 26 24 - 31 mmol/L    Urea Nitrogen 7 (L) 8 - 25 mg/dL    Creatinine 0.60 0.40 - 1.60 mg/dL    eGFR >90 >60 mL/min/1.73m*2    Calcium 8.5 8.5 - 10.4 mg/dL    Albumin 3.1 (L) 3.5 - 5.0 g/dL    Alkaline Phosphatase 104 35 - 125 U/L    Total Protein 6.2 5.9 - 7.9 g/dL    AST 78 (H) 5 - 40 U/L    Bilirubin, Total 0.9 0.1 - 1.2 mg/dL     (H) 5 - 40 U/L    Anion Gap 9 <=19 mmol/L     Transthoracic Echo (TTE) Complete    Result Date: 6/5/2024            [facility] [address], [place] [address]             Phone [phone] TRANSTHORACIC ECHOCARDIOGRAM REPORT  Patient Name:      ABDIEL JACOB      Tyrell Physician:    [id number] Earl Romano DO Study Date:        6/5/2024             Ordering Provider:    [id number] LISSETTE HUMPHREY MRN/PID:           [id number]             Fellow: Accession#:        [id number]         Nurse: Date of Birth/Age:  2000 / 24 years Sonographer:          Izabel Olmos RDCS Gender:            M                    Additional Staff: Height:            190.50 cm            Admit Date: Weight:            198.68 kg            Admission Status:     Inpatient -                                                               Routine BSA / BMI:         3.06 m2 / 54.75      Department Location:  [facility]                    kg/m2 Blood Pressure: 118 /81 mmHg Study Type:    TRANSTHORACIC ECHO (TTE) COMPLETE Diagnosis/ICD: Sepsis, unspecified organism-A41.9 Indication:    Severe sepsis CPT Codes:     Echo Complete w Full Doppler-[id number] Patient History: Pertinent History: No cardiac Hx. Study Detail: The following Echo studies were performed: 2D, M-Mode, Doppler and               color flow. Image quality for this study is fair. Technically               challenging study due to poor acoustic windows and body habitus.               Definity used as a contrast agent for endocardial border               definition. Total contrast used for this procedure was 2 mL via IV               push.  PHYSICIAN INTERPRETATION: Left Ventricle: Left ventricular systolic function is hyperdynamic, with an estimated ejection fraction of 70-75%. There are no regional wall motion abnormalities. The left ventricular cavity size is normal. Spectral Doppler shows a normal pattern of left ventricular diastolic filling. Left Atrium: The left atrium is normal in size. Right Ventricle: The right ventricle is normal in size. There is normal right ventricular global systolic function. Right Atrium: The right atrium is normal in size. Aortic Valve: The aortic valve appears structurally normal. There is no evidence of aortic valve regurgitation. The peak instantaneous gradient of the aortic valve is 9.4 mmHg. The mean gradient of the aortic valve is 5.0 mmHg. Mitral Valve: The mitral valve is normal in  structure. There is no evidence of mitral valve regurgitation. Tricuspid Valve: The tricuspid valve is structurally normal. No evidence of tricuspid regurgitation. The right ventricular systolic pressure is unable to be estimated. Pulmonic Valve: The pulmonic valve is structurally normal. There is no indication of pulmonic valve regurgitation. Pericardium: There is no pericardial effusion noted. Aorta: The aortic root is normal.  CONCLUSIONS:  1. Left ventricular systolic function is hyperdynamic with a 70-75% estimated ejection fraction. QUANTITATIVE DATA SUMMARY: M-MODE MEASUREMENTS:                  Normal Ranges: Ao Root: 2.40 cm (2.0-3.7cm) LAs:     3.60 cm (2.7-4.0cm) AORTIC VALVE:                                   Normal Ranges: AoV Vmax:                1.53 m/s (<=1.7m/s) AoV Peak P.4 mmHg (<20mmHg) AoV Mean P.0 mmHg (1.7-11.5mmHg) LVOT Max Mike:            1.23 m/s (<=1.1m/s) AoV VTI:                 23.60 cm (18-25cm) LVOT VTI:                19.70 cm LVOT Diameter:           2.20 cm  (1.8-2.4cm) AoV Area, VTI:           3.17 cm2 (2.5-5.5cm2) AoV Area,Vmax:           3.06 cm2 (2.5-4.5cm2) AoV Dimensionless Index: 0.83 PULMONIC VALVE:                         Normal Ranges: PV Accel Time: 98 msec  (>120ms) PV Max Mike:    1.4 m/s  (0.6-0.9m/s) PV Max P.5 mmHg  [id number] Earl Romano DO Electronically signed on 2024 at 5:31:10 PM  ** Final **     ECG 12 Lead    Result Date: 2024  Sinus tachycardia Otherwise normal ECG When compared with ECG of 01-MAY-2024 03:01, No significant change was found Confirmed by Earl Romano ([id number]) on 2024 10:59:53 AM    XR chest 1 view    Result Date: 2024  Interpreted By:  Isaias Wing, STUDY: XR CHEST 1 VIEW;  2024 3:15 am   INDICATION: Signs/Symptoms:sob.   COMPARISON: 2024   ACCESSION NUMBER(S): [id number]   ORDERING CLINICIAN: MANJINDER ODONNELL   FINDINGS:     The cardiomediastinal silhouette and pulmonary  vasculature are within normal limits. No consolidation, pleural effusion or pneumothorax.         No acute cardiopulmonary process.     MACRO: None.   Signed by: Isaias Wing 6/5/2024 3:35 AM Dictation workstation:   [id number]    CT abdomen pelvis w IV contrast    Result Date: 6/4/2024  Interpreted By:  Julianna Tolliver, STUDY: CT ABDOMEN PELVIS W IV CONTRAST;  6/4/2024 3:03 pm   INDICATION: Signs/Symptoms:abdominal pain, hematuria, bandemia.   COMPARISON: None.   ACCESSION NUMBER(S): [id number]   ORDERING CLINICIAN: VIDHYA WYATT   TECHNIQUE: CT of the abdomen and pelvis was performed.  75 mL Omnipaque 350   FINDINGS: LOWER CHEST: Images of the lung bases show no infiltrate or pleural fluid. There is flame shaped tissue in the retroareolar region of the left breast, likely unilateral gynecomastia.   ABDOMEN:   LIVER: There is no hepatic mass. There is fatty infiltration of the liver.   BILE DUCTS: There is no intrahepatic, common hepatic or common bile ductal dilatation.   GALLBLADDER: The gallbladder is unremarkable.   PANCREAS: The pancreas is unremarkable.   SPLEEN: The spleen is unremarkable. There is no splenic mass or splenomegaly.   ADRENAL GLANDS: The adrenal glands are unremarkable.   KIDNEYS AND URETERS: The kidneys function symmetrically. The kidneys demonstrate no mass. There is no intrarenal calculus or hydronephrosis. There is a Godoy catheter insufflated in the bladder. There is hyperdense fluid in the bladder. This is consistent with blood. This could be secondary to hemorrhagic cystitis on or iatrogenic from Godoy catheter placement.   BOWEL: There is no bowel wall thickening, dilatation or obstruction. The appendix is normal.   VESSELS: The abdominal and pelvic vessels are unremarkable.   PERITONEUM/RETROPERITONEUM/LYMPH NODES: There is no retroperitoneal or pelvic adenopathy.  There is no ascites.   ABDOMINAL WALL: The abdominal wall is unremarkable.   BONE AND SOFT TISSUE: There is no  acute osseous finding. Status post partial resection spinous process L4   There is no soft tissue abnormality.       1. Castaneda catheter insufflated in the bladder. There is hyperdense material in the bladder lumen. This is either iatrogenic from catheter placement or hemorrhagic cystitis. 2. Fatty infiltration of the liver.   MACRO: None   Signed by: Julianna Tolliver 6/4/2024 3:32 PM Dictation workstation:   [id number]            Assessment/Plan   Principal Problem:    E coli bacteremia  Active Problems:    Morbid obesity (Multi)    Septic shock (Multi)    Gross hematuria    Gram negative sepsis (Multi)    Sepsis secondary to E coli bacteremia - Patient was afebrile overnight and today. Patient on IV merepenem. New blood/urine cultures pending. Recent labs show Lactate 2.0. Today WBC 5.9. Platelet count dropped 247 -> 138.   Morbid obesity (Multi)  Gross hematuria - CT of pelvis (6/4) showed castaneda catheter insufflated in bladder with hyperdense material in bladder lumen consistent with blood. Blood culture indicates gram negative bacilli. New cultures pending.   S/p Lumbar laminectomy for congenital lumbar stenosis with cauda equina syndrome - recommending repeat lumbar imaging. Unable to visualize wound VAC. Denies new onset-pain.             Fili Tubbs

## 2024-06-07 NOTE — PROGRESS NOTES
"Erwin Cline is a 24 y.o. male on day 3 of admission presenting with E coli bacteremia.    Subjective      Seen and examined; awake alert; CT IV contrast L Spine; Plan IV Abx 2 weeks minimum per ID discussion; plan PICC and DC approx Monday. Transfer RNF    Objective     Physical Exam  Vitals and nursing note reviewed.   Constitutional:       Appearance: He is obese.   HENT:      Head: Normocephalic and atraumatic.      Mouth/Throat:      Mouth: Mucous membranes are moist.   Eyes:      Extraocular Movements: Extraocular movements intact.   Cardiovascular:      Rate and Rhythm: Normal rate and regular rhythm.      Pulses: Normal pulses.      Heart sounds: Normal heart sounds.   Pulmonary:      Effort: Pulmonary effort is normal.      Breath sounds: Normal breath sounds.   Abdominal:      General: Abdomen is flat.      Palpations: Abdomen is soft.   Musculoskeletal:         General: Normal range of motion.      Cervical back: Normal range of motion and neck supple.   Skin:     General: Skin is warm and dry.      Capillary Refill: Capillary refill takes less than 2 seconds.   Neurological:      General: No focal deficit present.      Mental Status: He is alert and oriented to person, place, and time. Mental status is at baseline.   Psychiatric:         Mood and Affect: Mood normal.         Last Recorded Vitals  Blood pressure 126/77, pulse 90, temperature 36.2 °C (97.2 °F), temperature source Temporal, resp. rate 22, height 1.905 m (6' 3\"), weight (!) 204 kg (449 lb), SpO2 96%.  Intake/Output last 3 Shifts:  I/O last 3 completed shifts:  In: 668.8 (3.3 mL/kg) [IV Piggyback:668.8]  Out: 6050 (29.7 mL/kg) [Urine:6050 (0.8 mL/kg/hr)]  Weight: 203.7 kg     Relevant Results           This patient currently has cardiac telemetry ordered; if you would like to modify or discontinue the telemetry order, click here to go to the orders activity to modify/discontinue the order.  Susceptibility data from last 90 " days.  Collected Specimen Info Organism Amikacin Amoxicillin/Clavulanate Ampicillin Ampicillin/Sulbactam Cefazolin Ceftriaxone Cefuroxime Ciprofloxacin Gentamicin Levofloxacin Piperacillin/Tazobactam Tobramycin   06/04/24 Blood culture from Peripheral Venipuncture Escherichia coli               06/04/24 Blood culture from Peripheral Venipuncture Escherichia coli  S  S  R  R  I  S  S  R  R  R  S  R     Collected Specimen Info Organism Trimethoprim/Sulfamethoxazole   06/04/24 Blood culture from Peripheral Venipuncture Escherichia coli    06/04/24 Blood culture from Peripheral Venipuncture Escherichia coli  S     Results for orders placed or performed during the hospital encounter of 06/04/24 (from the past 24 hour(s))   CBC and Auto Differential   Result Value Ref Range    WBC 5.9 4.4 - 11.3 x10*3/uL    nRBC 0.0 0.0 - 0.0 /100 WBCs    RBC 4.01 (L) 4.50 - 5.90 x10*6/uL    Hemoglobin 10.9 (L) 13.5 - 17.5 g/dL    Hematocrit 34.2 (L) 41.0 - 52.0 %    MCV 85 80 - 100 fL    MCH 27.2 26.0 - 34.0 pg    MCHC 31.9 (L) 32.0 - 36.0 g/dL    RDW 14.9 (H) 11.5 - 14.5 %    Platelets 138 (L) 150 - 450 x10*3/uL    Neutrophils % 66.9 40.0 - 80.0 %    Immature Granulocytes %, Automated 0.3 0.0 - 0.9 %    Lymphocytes % 20.6 13.0 - 44.0 %    Monocytes % 9.1 2.0 - 10.0 %    Eosinophils % 2.9 0.0 - 6.0 %    Basophils % 0.2 0.0 - 2.0 %    Neutrophils Absolute 3.96 1.20 - 7.70 x10*3/uL    Immature Granulocytes Absolute, Automated 0.02 0.00 - 0.70 x10*3/uL    Lymphocytes Absolute 1.22 1.20 - 4.80 x10*3/uL    Monocytes Absolute 0.54 0.10 - 1.00 x10*3/uL    Eosinophils Absolute 0.17 0.00 - 0.70 x10*3/uL    Basophils Absolute 0.01 0.00 - 0.10 x10*3/uL   Magnesium   Result Value Ref Range    Magnesium 1.80 1.60 - 3.10 mg/dL   Comprehensive Metabolic Panel   Result Value Ref Range    Glucose 108 (H) 65 - 99 mg/dL    Sodium 141 133 - 145 mmol/L    Potassium 3.9 3.4 - 5.1 mmol/L    Chloride 106 97 - 107 mmol/L    Bicarbonate 26 24 - 31 mmol/L    Urea  Nitrogen 7 (L) 8 - 25 mg/dL    Creatinine 0.60 0.40 - 1.60 mg/dL    eGFR >90 >60 mL/min/1.73m*2    Calcium 8.5 8.5 - 10.4 mg/dL    Albumin 3.1 (L) 3.5 - 5.0 g/dL    Alkaline Phosphatase 104 35 - 125 U/L    Total Protein 6.2 5.9 - 7.9 g/dL    AST 78 (H) 5 - 40 U/L    Bilirubin, Total 0.9 0.1 - 1.2 mg/dL     (H) 5 - 40 U/L    Anion Gap 9 <=19 mmol/L     Scheduled medications  acetaminophen, 975 mg, oral, Once  docusate sodium, 100 mg, oral, BID  enoxaparin, 40 mg, subcutaneous, Daily  lidocaine, 5 mL, infiltration, Once  meropenem, 2 g, intravenous, q8h      Continuous medications   dextrose 5% lactated Ringer's with KCl 20 mEq/L, 75 mL/hr, Last Rate: 75 mL/hr (06/07/24 0817)      PRN medications  PRN medications: acetaminophen, methocarbamol, polyethylene glycol, traZODone                 Assessment/Plan   Principal Problem:    E coli bacteremia  Active Problems:    Morbid obesity (Multi)    Septic shock (Multi)    Gross hematuria    Gram negative sepsis (Multi)    Transfer to regular nursing floor; Plan OP Abx DC approx Monday       I spent 45 minutes in the professional and overall care of this patient.      Mati Geller DO

## 2024-06-07 NOTE — PROGRESS NOTES
Erwin Cline is a 24 y.o. male on day 3 of admission presenting with E coli bacteremia.    Subjective   Interval History:   Denies back pain  Denies shortness of breath cough or chest pain  Interval discussion with primary team   Unable to have MRI            Objective   Range of Vitals (last 24 hours)  Heart Rate:  [90]   Temp:  [36.2 °C (97.2 °F)-37 °C (98.6 °F)]   Resp:  [18-22]   BP: (126-139)/(68-77)   Weight:  [200 kg (440 lb)-204 kg (449 lb)]   SpO2:  [96 %]   Daily Weight  06/06/24 : (!) 204 kg (449 lb)    Body mass index is 56.12 kg/m².    Physical Exam  Constitutional:       Appearance: Normal appearance.   HENT:      Head: Normocephalic and atraumatic.      Nose: Nose normal.      Mouth/Throat:      Mouth: Mucous membranes are moist.      Pharynx: Oropharynx is clear.   Eyes:      Conjunctiva/sclera: Conjunctivae normal.   Cardiovascular:      Rate and Rhythm: Regular rhythm. Mild Tachycardia present.   Pulmonary:      Effort: Pulmonary effort is normal.      Breath sounds: Normal breath sounds.   Abdominal:      General: Bowel sounds are normal.      Palpations: Abdomen is soft.   Genitourinary: castaneda catheter   Musculoskeletal:         General: Normal range of motion.      Cervical back: Normal range of motion and neck supple.   Skin:     General: Skin is warm and dry.      Comments: Healed surgical incision    Neurological:      Mental Status: He is alert.      Comments: Awake, alert   Psychiatric:         Mood and Affect: Mood normal.         Behavior: Behavior normal.     Antibiotics  cefTRIAXone (Rocephin) IVPB 1 g  sodium chloride 0.9 % bolus 1,000 mL  acetaminophen (Tylenol) tablet 975 mg  ketorolac (Toradol) injection 15 mg  ondansetron (Zofran) injection 4 mg  iohexol (OMNIPaque) 350 mg iodine/mL solution 75 mL  sodium chloride 0.9 % bolus 1,000 mL  sodium chloride 0.9 % bolus 1,000 mL  vancomycin 1.5 g in 300 mL (Xellia) IVPB 1.5 g  tobramycin (Nebcin) 700 mg in dextrose 5% 100 mL  IV  cefTRIAXone (Rocephin) IVPB 1 g  lactated Ringer's bolus 1,000 mL  docusate sodium (Colace) capsule 100 mg  enoxaparin (Lovenox) syringe 40 mg  methocarbamol (Robaxin) tablet 500 mg  polyethylene glycol (Glycolax, Miralax) packet 17 g  enoxaparin (Lovenox) syringe 40 mg  polyethylene glycol (Glycolax, Miralax) packet 17 g  cefTRIAXone (Rocephin) 2 g in dextrose 5% in water (D5W) 50 mL  acetaminophen (Tylenol) tablet 975 mg  meropenem (Merrem) 1 g IV in sodium chloride 0.9% 50 mL  vancomycin (Vancocin) pharmacy to dose - pharmacy monitoring  sodium chloride 0.9 % bolus 1,000 mL  vancomycin-diluent combo no.1 (Xellia) IVPB 2 g  sodium chloride 0.9% infusion  ibuprofen tablet 800 mg  norepinephrine (Levophed) 8 mg in dextrose 5% 250 mL (0.032 mg/mL) infusion (premix)  ketorolac (Toradol) injection 15 mg  lidocaine (Xylocaine) 10 mg/mL (1 %) injection 50 mg  alteplase (Cathflo Activase) injection 2 mg  meropenem (Merrem) in sodium chloride 0.9 % 100 mL IV 2 g  dextrose 5 % and lactated Ringer's with KCl 20 mEq/L infusion  acetaminophen (Tylenol) tablet 650 mg  perflutren lipid microspheres (Definity) injection 0.5-10 mL of dilution  sulfur hexafluoride microsphr (Lumason) injection 24.28 mg  perflutren protein A microsphere (Optison) injection 0.5 mL      Relevant Results  Labs  Results from last 72 hours   Lab Units 06/07/24  0535 06/06/24  0616 06/05/24  0517   WBC AUTO x10*3/uL 5.9 7.9 25.4*   HEMOGLOBIN g/dL 10.9* 10.7* 11.8*   HEMATOCRIT % 34.2* 32.9* 37.1*   PLATELETS AUTO x10*3/uL 138* 143* 247   NEUTROS PCT AUTO % 66.9 82.2 95.2   LYMPHS PCT AUTO % 20.6 10.0 1.1   MONOS PCT AUTO % 9.1 6.1 2.3   EOS PCT AUTO % 2.9 0.5 0.0     Results from last 72 hours   Lab Units 06/07/24  0535 06/06/24  0616 06/05/24  0517   SODIUM mmol/L 141 138 138   POTASSIUM mmol/L 3.9 3.6 4.5   CHLORIDE mmol/L 106 106 105   CO2 mmol/L 26 22* 20*   BUN mg/dL 7* 12 23   CREATININE mg/dL 0.60 0.70 1.30   GLUCOSE mg/dL 108* 98 103*  "  CALCIUM mg/dL 8.5 8.3* 8.1*   ANION GAP mmol/L 9 10 13   EGFR mL/min/1.73m*2 >90 >90 79     Results from last 72 hours   Lab Units 06/07/24  0535 06/06/24  0616 06/05/24  0517   ALK PHOS U/L 104 88 65   BILIRUBIN TOTAL mg/dL 0.9 1.6* 1.4*   PROTEIN TOTAL g/dL 6.2 6.2 6.2   ALT U/L 154* 154* 157*   AST U/L 78* 84* 56*   ALBUMIN g/dL 3.1* 3.0* 3.3*     Estimated Creatinine Clearance: 125 mL/min (by C-G formula based on SCr of 0.6 mg/dL).  No results found for: \"CRP\"  Microbiology  Susceptibility data from last 14 days.  Collected Specimen Info Organism Amikacin Amoxicillin/Clavulanate Ampicillin Ampicillin/Sulbactam Cefazolin Ceftriaxone Cefuroxime Ciprofloxacin Gentamicin Levofloxacin Piperacillin/Tazobactam Tobramycin   06/04/24 Blood culture from Peripheral Venipuncture Escherichia coli               06/04/24 Blood culture from Peripheral Venipuncture Escherichia coli  S  S  R  R  I  S  S  R  R  R  S  R     Collected Specimen Info Organism Trimethoprim/Sulfamethoxazole   06/04/24 Blood culture from Peripheral Venipuncture Escherichia coli    06/04/24 Blood culture from Peripheral Venipuncture Escherichia coli  S       Imaging  Transthoracic Echo (TTE) Complete    Result Date: 6/5/2024            [place] [address]             Phone [phone] TRANSTHORACIC ECHOCARDIOGRAM REPORT  Patient Name:      ABDIEL Santillan Physician:    [id number] Earl Romano DO Study Date:        6/5/2024             Ordering Provider:    [id number] LISSETTE HUMPHREY MRN/PID:           [id number]             Fellow: Accession#:        [id number]         Nurse: Date of Birth/Age: 2000 / 24 years Sonographer:          Izabel Olmos RDCS Gender:            M                    Additional Staff: " Height:            190.50 cm            Admit Date: Weight:            198.68 kg            Admission Status:     Inpatient -                                                               Routine BSA / BMI:         3.06 m2 / 54.75      Department Location:  [facility]                    kg/m2 Blood Pressure: 118 /81 mmHg Study Type:    TRANSTHORACIC ECHO (TTE) COMPLETE Diagnosis/ICD: Sepsis, unspecified organism-A41.9 Indication:    Severe sepsis CPT Codes:     Echo Complete w Full [id number] Patient History: Pertinent History: No cardiac Hx. Study Detail: The following Echo studies were performed: 2D, M-Mode, Doppler and               color flow. Image quality for this study is fair. Technically               challenging study due to poor acoustic windows and body habitus.               Definity used as a contrast agent for endocardial border               definition. Total contrast used for this procedure was 2 mL via IV               push.  PHYSICIAN INTERPRETATION: Left Ventricle: Left ventricular systolic function is hyperdynamic, with an estimated ejection fraction of 70-75%. There are no regional wall motion abnormalities. The left ventricular cavity size is normal. Spectral Doppler shows a normal pattern of left ventricular diastolic filling. Left Atrium: The left atrium is normal in size. Right Ventricle: The right ventricle is normal in size. There is normal right ventricular global systolic function. Right Atrium: The right atrium is normal in size. Aortic Valve: The aortic valve appears structurally normal. There is no evidence of aortic valve regurgitation. The peak instantaneous gradient of the aortic valve is 9.4 mmHg. The mean gradient of the aortic valve is 5.0 mmHg. Mitral Valve: The mitral valve is normal in structure. There is no evidence of mitral valve regurgitation. Tricuspid Valve: The tricuspid valve is structurally normal. No evidence of tricuspid regurgitation. The right ventricular  systolic pressure is unable to be estimated. Pulmonic Valve: The pulmonic valve is structurally normal. There is no indication of pulmonic valve regurgitation. Pericardium: There is no pericardial effusion noted. Aorta: The aortic root is normal.  CONCLUSIONS:  1. Left ventricular systolic function is hyperdynamic with a 70-75% estimated ejection fraction. QUANTITATIVE DATA SUMMARY: M-MODE MEASUREMENTS:                  Normal Ranges: Ao Root: 2.40 cm (2.0-3.7cm) LAs:     3.60 cm (2.7-4.0cm) AORTIC VALVE:                                   Normal Ranges: AoV Vmax:                1.53 m/s (<=1.7m/s) AoV Peak P.4 mmHg (<20mmHg) AoV Mean P.0 mmHg (1.7-11.5mmHg) LVOT Max Mike:            1.23 m/s (<=1.1m/s) AoV VTI:                 23.60 cm (18-25cm) LVOT VTI:                19.70 cm LVOT Diameter:           2.20 cm  (1.8-2.4cm) AoV Area, VTI:           3.17 cm2 (2.5-5.5cm2) AoV Area,Vmax:           3.06 cm2 (2.5-4.5cm2) AoV Dimensionless Index: 0.83 PULMONIC VALVE:                         Normal Ranges: PV Accel Time: 98 msec  (>120ms) PV Max Mike:    1.4 m/s  (0.6-0.9m/s) PV Max P.5 mmHg  [id number] Earl Romano DO Electronically signed on 2024 at 5:31:10 PM  ** Final **     ECG 12 Lead    Result Date: 2024  Sinus tachycardia Otherwise normal ECG When compared with ECG of 01-MAY-2024 03:01, No significant change was found Confirmed by Earl Romano ([id number]) on 2024 10:59:53 AM    XR chest 1 view    Result Date: 2024  Interpreted By:  Isaias Wing, STUDY: XR CHEST 1 VIEW;  2024 3:15 am   INDICATION: Signs/Symptoms:sob.   COMPARISON: 2024   ACCESSION NUMBER(S): [id number]   ORDERING CLINICIAN: MANJINDER ODONNELL   FINDINGS:     The cardiomediastinal silhouette and pulmonary vasculature are within normal limits. No consolidation, pleural effusion or pneumothorax.         No acute cardiopulmonary process.     MACRO: None.   Signed by: Isaias Wing  6/5/2024 3:35 AM Dictation workstation:   [id number]    CT abdomen pelvis w IV contrast    Result Date: 6/4/2024  Interpreted By:  Julianna Tolliver, STUDY: CT ABDOMEN PELVIS W IV CONTRAST;  6/4/2024 3:03 pm   INDICATION: Signs/Symptoms:abdominal pain, hematuria, bandemia.   COMPARISON: None.   ACCESSION NUMBER(S): [id number]   ORDERING CLINICIAN: VIDHYA WYATT   TECHNIQUE: CT of the abdomen and pelvis was performed.  75 mL Omnipaque 350   FINDINGS: LOWER CHEST: Images of the lung bases show no infiltrate or pleural fluid. There is flame shaped tissue in the retroareolar region of the left breast, likely unilateral gynecomastia.   ABDOMEN:   LIVER: There is no hepatic mass. There is fatty infiltration of the liver.   BILE DUCTS: There is no intrahepatic, common hepatic or common bile ductal dilatation.   GALLBLADDER: The gallbladder is unremarkable.   PANCREAS: The pancreas is unremarkable.   SPLEEN: The spleen is unremarkable. There is no splenic mass or splenomegaly.   ADRENAL GLANDS: The adrenal glands are unremarkable.   KIDNEYS AND URETERS: The kidneys function symmetrically. The kidneys demonstrate no mass. There is no intrarenal calculus or hydronephrosis. There is a Godoy catheter insufflated in the bladder. There is hyperdense fluid in the bladder. This is consistent with blood. This could be secondary to hemorrhagic cystitis on or iatrogenic from Godoy catheter placement.   BOWEL: There is no bowel wall thickening, dilatation or obstruction. The appendix is normal.   VESSELS: The abdominal and pelvic vessels are unremarkable.   PERITONEUM/RETROPERITONEUM/LYMPH NODES: There is no retroperitoneal or pelvic adenopathy.  There is no ascites.   ABDOMINAL WALL: The abdominal wall is unremarkable.   BONE AND SOFT TISSUE: There is no acute osseous finding. Status post partial resection spinous process L4   There is no soft tissue abnormality.       1. Godoy catheter insufflated in the bladder. There is hyperdense  material in the bladder lumen. This is either iatrogenic from catheter placement or hemorrhagic cystitis. 2. Fatty infiltration of the liver.   MACRO: None   Signed by: Julianna Tolliver 6/4/2024 3:32 PM Dictation workstation:   [id number]        This patient currently has cardiac telemetry ordered; if you would like to modify or discontinue the telemetry order, click here to go to the orders activity to modify/discontinue the order.  Assessment/Plan   Sepsis secondary to gram negative bacteremia  Acute kidney injury, resolving  Fever, secondary to below  E. Coli bacteremia likely secondary to UTI-susceptible to cefuroxime, ceftriaxone   Hematuria, likely secondary to UTI, castaneda catheter   Pyuria versus urinary tract infection-culture pending   Status post Recent spine surgery  Leukocytosis, resolved     IV meropenem  Consider addition IV vancomycin if clinically worsens  Monitor renal function  Follow blood culture  Follow urine culture  Monitor temperature and WBC  CT lumbar spine with contrast  Consider Orthopedic spine  surgery consult     Total time spent caring for the patient today was 35 minutes. This includes time spent before the visit reviewing the chart, time spent during the visit, and time spent after the visit on documentation     MARCELINO Bermudez-CNP

## 2024-06-07 NOTE — PROGRESS NOTES
06/07/24 0944   Discharge Planning   Patient expects to be discharged to: Home with [facility]   Does the patient need discharge transport arranged? No   Patient Choice   Provider Choice list and CMS website (https://medicare.gov/care-compare#search) for post-acute Quality and Resource Measure Data were provided and reviewed with: Patient   Patient / Family choosing to utilize agency / facility established prior to hospitalization Yes     Patient is active with [facility] and plans to resume the same upon discharge; updates sent to them at this time.

## 2024-06-08 PROCEDURE — 94760 N-INVAS EAR/PLS OXIMETRY 1: CPT

## 2024-06-08 PROCEDURE — 2500000001 HC RX 250 WO HCPCS SELF ADMINISTERED DRUGS (ALT 637 FOR MEDICARE OP): Performed by: NURSE PRACTITIONER

## 2024-06-08 PROCEDURE — 9420000001 HC RT PATIENT EDUCATION 5 MIN

## 2024-06-08 PROCEDURE — 1100000001 HC PRIVATE ROOM DAILY

## 2024-06-08 PROCEDURE — 2500000004 HC RX 250 GENERAL PHARMACY W/ HCPCS (ALT 636 FOR OP/ED): Performed by: INTERNAL MEDICINE

## 2024-06-08 PROCEDURE — 94762 N-INVAS EAR/PLS OXIMTRY CONT: CPT

## 2024-06-08 PROCEDURE — 2500000004 HC RX 250 GENERAL PHARMACY W/ HCPCS (ALT 636 FOR OP/ED): Performed by: NURSE PRACTITIONER

## 2024-06-08 PROCEDURE — 97530 THERAPEUTIC ACTIVITIES: CPT | Mod: GO

## 2024-06-08 RX ADMIN — MEROPENEM 2 G: 2 INJECTION, POWDER, FOR SOLUTION INTRAVENOUS at 02:29

## 2024-06-08 RX ADMIN — MEROPENEM 2 G: 2 INJECTION, POWDER, FOR SOLUTION INTRAVENOUS at 19:00

## 2024-06-08 RX ADMIN — POLYETHYLENE GLYCOL 3350 17 G: 17 POWDER, FOR SOLUTION ORAL at 19:11

## 2024-06-08 RX ADMIN — DOCUSATE SODIUM 100 MG: 100 CAPSULE, LIQUID FILLED ORAL at 23:26

## 2024-06-08 RX ADMIN — MEROPENEM 2 G: 2 INJECTION, POWDER, FOR SOLUTION INTRAVENOUS at 09:24

## 2024-06-08 RX ADMIN — DOCUSATE SODIUM 100 MG: 100 CAPSULE, LIQUID FILLED ORAL at 09:00

## 2024-06-08 ASSESSMENT — COGNITIVE AND FUNCTIONAL STATUS - GENERAL
MOVING TO AND FROM BED TO CHAIR: A LITTLE
DRESSING REGULAR UPPER BODY CLOTHING: A LITTLE
DRESSING REGULAR LOWER BODY CLOTHING: A LOT
MOVING FROM LYING ON BACK TO SITTING ON SIDE OF FLAT BED WITH BEDRAILS: A LITTLE
TOILETING: TOTAL
MOBILITY SCORE: 18
STANDING UP FROM CHAIR USING ARMS: A LITTLE
CLIMB 3 TO 5 STEPS WITH RAILING: A LITTLE
PERSONAL GROOMING: A LITTLE
HELP NEEDED FOR BATHING: A LOT
TOILETING: TOTAL
WALKING IN HOSPITAL ROOM: A LITTLE
DAILY ACTIVITIY SCORE: 16
HELP NEEDED FOR BATHING: A LOT
DRESSING REGULAR LOWER BODY CLOTHING: A LOT
DAILY ACTIVITIY SCORE: 15
TURNING FROM BACK TO SIDE WHILE IN FLAT BAD: A LITTLE
DRESSING REGULAR UPPER BODY CLOTHING: A LITTLE

## 2024-06-08 ASSESSMENT — PAIN - FUNCTIONAL ASSESSMENT: PAIN_FUNCTIONAL_ASSESSMENT: 0-10

## 2024-06-08 ASSESSMENT — PAIN SCALES - GENERAL: PAINLEVEL_OUTOF10: 0 - NO PAIN

## 2024-06-08 NOTE — PROGRESS NOTES
"Erwin Cline is a 24 y.o. male on day 4 of admission presenting with E coli bacteremia.    Subjective   Denies chest pain or shortness of breath    Objective     Physical Exam  Vitals and nursing note reviewed.   Constitutional:       Appearance: He is obese.   HENT:      Head: Normocephalic and atraumatic.      Mouth/Throat:      Mouth: Mucous membranes are moist.   Eyes:      Extraocular Movements: Extraocular movements intact.   Cardiovascular:      Rate and Rhythm: Normal rate and regular rhythm.      Pulses: Normal pulses.      Heart sounds: Normal heart sounds.   Pulmonary:      Effort: Pulmonary effort is normal.      Breath sounds: Normal breath sounds.   Abdominal:      General: Abdomen is flat.      Palpations: Abdomen is soft.   Musculoskeletal:         General: Normal range of motion.      Cervical back: Normal range of motion and neck supple.   Skin:     General: Skin is warm and dry.      Capillary Refill: Capillary refill takes less than 2 seconds.   Neurological:      General: No focal deficit present.      Mental Status: He is alert and oriented to person, place, and time. Mental status is at baseline.   Psychiatric:         Mood and Affect: Mood normal.         Last Recorded Vitals  Blood pressure 166/58, pulse 79, temperature 36.7 °C (98.1 °F), temperature source Oral, resp. rate 17, height 1.905 m (6' 3\"), weight (!) 204 kg (449 lb), SpO2 99%.  Intake/Output last 3 Shifts:  I/O last 3 completed shifts:  In: 240 (1.2 mL/kg) [P.O.:240]  Out: 5075 (24.9 mL/kg) [Urine:5075 (0.7 mL/kg/hr)]  Weight: 203.7 kg     Relevant Results        Susceptibility data from last 90 days.  Collected Specimen Info Organism Amikacin Amoxicillin/Clavulanate Ampicillin Ampicillin/Sulbactam Cefazolin Ceftriaxone Cefuroxime Ciprofloxacin Gentamicin Levofloxacin Piperacillin/Tazobactam Tobramycin   06/04/24 Blood culture from Peripheral Venipuncture Escherichia coli               06/04/24 Blood culture from " Peripheral Venipuncture Escherichia coli  S  S  R  R  I  S  S  R  R  R  S  R     Collected Specimen Info Organism Trimethoprim/Sulfamethoxazole   06/04/24 Blood culture from Peripheral Venipuncture Escherichia coli    06/04/24 Blood culture from Peripheral Venipuncture Escherichia coli  S     Scheduled medications  acetaminophen, 975 mg, oral, Once  docusate sodium, 100 mg, oral, BID  enoxaparin, 40 mg, subcutaneous, Daily  lidocaine, 5 mL, infiltration, Once  meropenem, 2 g, intravenous, q8h      Continuous medications       PRN medications  PRN medications: acetaminophen, methocarbamol, polyethylene glycol, traZODone                 Assessment/Plan   E. coli sepsis with septic shock  Morbid obesity  Gross hematuria    Plan:  PICC line for extended IV antimicrobial therapy  IV meropenem per infectious disease  DVT prophylaxis  Await CT of the lumbar spine  CBC BMP tomorrow      Jaqueline Aly MD

## 2024-06-08 NOTE — CARE PLAN
Patient calling to say she was told to wear a boot for 3 more weeks and take medication. The medication she was told to take didn't do much at all.  She has stopped wearing the boot and is still having some discomfort. Should she still wear the boot.  Would like recommendations.   The patient's goals for the shift include receive IV antibx    The clinical goals for the shift include safety      Problem: Pain - Adult  Goal: Verbalizes/displays adequate comfort level or baseline comfort level  Outcome: Progressing     Problem: Discharge Planning  Goal: Discharge to home or other facility with appropriate resources  Outcome: Progressing     Problem: Chronic Conditions and Co-morbidities  Goal: Patient's chronic conditions and co-morbidity symptoms are monitored and maintained or improved  Outcome: Progressing     Problem: Skin  Goal: Participates in plan/prevention/treatment measures  Outcome: Progressing  Goal: Prevent/manage excess moisture  Outcome: Progressing  Goal: Prevent/minimize sheer/friction injuries  Outcome: Progressing  Goal: Promote/optimize nutrition  Outcome: Progressing  Goal: Promote skin healing  Outcome: Progressing     Problem: Fall/Injury  Goal: Not fall by end of shift  Outcome: Progressing  Goal: Be free from injury by end of the shift  Outcome: Progressing  Goal: Verbalize understanding of personal risk factors for fall in the hospital  Outcome: Progressing  Goal: Verbalize understanding of risk factor reduction measures to prevent injury from fall in the home  Outcome: Progressing  Goal: Use assistive devices by end of the shift  Outcome: Progressing  Goal: Pace activities to prevent fatigue by end of the shift  Outcome: Progressing

## 2024-06-08 NOTE — PROGRESS NOTES
Occupational Therapy    OT Treatment    Patient Name: Erwin Cline  MRN: [id number]  Today's Date: 6/8/2024  Time Calculation  Start Time: 0920  Stop Time: 0940  Time Calculation (min): 20 min       Assessment:  OT Assessment: Patient fatigued during session, OT offering breakfast with patient refusing. Patient also with limited motivation for completing hygienic tasks. Pt would benefit from continued skilled OT to maximize safety, functional activity tolerance, and indep with ADLs prior to discharge.  Prognosis: Fair  Barriers to Discharge: Other (Comment)  End of Session Communication: Bedside nurse  End of Session Patient Position: Bed, 3 rail up, Alarm off, not on at start of session  OT Assessment Results: Decreased ADL status, Decreased safe judgment during ADL, Decreased endurance, Decreased functional mobility, Decreased gross motor control  Prognosis: Fair  Barriers to Discharge: Other (Comment)  Plan:  Treatment Interventions: Functional transfer training, UE strengthening/ROM  OT Frequency: 4 times per week  OT Discharge Recommendations: Moderate intensity level of continued care  Equipment Recommended upon Discharge: Wheeled walker  OT Recommended Transfer Status: Assist of 1  OT - OK to Discharge: Yes  Treatment Interventions: Functional transfer training, UE strengthening/ROM    Subjective   Previous Visit Info:  OT Last Visit  OT Received On: 06/08/24  General:  General  Past Medical History Relevant to Rehab: ADHD, asperger's, OCD, recent lumbar lami d/t cauda equina syndrome  Family/Caregiver Present: No  Prior to Session Communication: Bedside nurse  Patient Position Received: Bed, 4 rail up, Alarm off, not on at start of session  Preferred Learning Style: verbal  General Comment: Agreeable to treatment.  Precautions:  Medical Precautions: Fall precautions  Post-Surgical Precautions: Spinal precautions  Vital Signs:     Pain:  Pain Assessment  Pain Assessment: 0-10  Pain Score: 0 - No  pain    Objective    Cognition:  Cognition  Overall Cognitive Status: Within Functional Limits    Activities of Daily Living:      Grooming  Grooming Comments: Pt refused completing grooming tasks on this date.    Toileting  Toileting Comments:  (Godoy catheter)  Functional Standing Tolerance:  Time: ~20 seconds in standing x 2-3 trials with patient self initiating seated rest break  Bed Mobility/Transfers: Bed Mobility  Bed Mobility: Yes  Bed Mobility 1  Bed Mobility 1: Supine to sitting  Level of Assistance 1: Close supervision  Bed Mobility Comments 1:  (with cueing for log rolling)  Bed Mobility 2  Bed Mobility  2: Sitting to supine  Level of Assistance 2: Close supervision  Bed Mobility Comments 2: with cues for log rolling    Transfers  Transfer: Yes  Transfer 1  Transfer From 1: Bed to  Transfer to 1: Stand  Technique 1: Sit to stand, Stand to sit  Transfer Device 1: Walker  Transfer Level of Assistance 1: Contact guard     Functional Mobility:  Functional Mobility  Functional Mobility Performed: Yes  Functional Mobility 1  Surface 1: Level tile  Device 1: Rolling walker  Assistance 1: Close supervision  Comments 1: Short steps to HOB     Therapy/Activity: Therapeutic Exercise  Therapeutic Exercise Performed: Yes  Therapeutic Exercise Activity 1: Bilat elbow flexion at 3 sets x 12 repetitions with 2lb resistance  Therapeutic Exercise Activity 2: Alternating overhead punches at 3 sets x 12 repetitions with 2lb resistance    OP EDUCATION:  Education  Individual(s) Educated: Patient    Goals:  Encounter Problems       Encounter Problems (Active)       OT Goals       ADLs (Progressing)       Start:  06/05/24    Expected End:  06/19/24       Patient will complete ADLS with MIN A using AE/compensatory techniques as needed.          Functional Transfers (Progressing)       Start:  06/05/24    Expected End:  06/19/24       Patient will complete functional transfers, including but not limited to: bed, chair, toilet  transfers with MIN A using LRD.         UE Strengthening (Progressing)       Start:  06/05/24    Expected End:  06/19/24       Patient will improve UE strength to 4/5 in preparation for ADLs/transfers.

## 2024-06-08 NOTE — PROGRESS NOTES
Erwin Cline is a 24 y.o. male on day 4 of admission presenting with E coli bacteremia.    Subjective   Interval History:   Denies back pain  Overall feeling better  Denies shortness of breath cough or chest pain  Awaiting CT lumbar spine          Objective   Range of Vitals (last 24 hours)  Heart Rate:  [72-93]   Temp:  [36.5 °C (97.7 °F)-37 °C (98.6 °F)]   Resp:  [17-19]   BP: (134-166)/(54-62)   SpO2:  [96 %-99 %]   Daily Weight  06/06/24 : (!) 204 kg (449 lb)    Body mass index is 56.12 kg/m².    Physical Exam  Constitutional:       Appearance: Normal appearance.   HENT:      Head: Normocephalic and atraumatic.      Nose: Nose normal.      Mouth/Throat:      Mouth: Mucous membranes are moist.      Pharynx: Oropharynx is clear.   Eyes:      Conjunctiva/sclera: Conjunctivae normal.   Cardiovascular:      Rate and Rhythm: Regular rhythm present.   Pulmonary:      Effort: Pulmonary effort is normal.      Breath sounds: Normal breath sounds.   Abdominal:      General: Bowel sounds are normal.      Palpations: Abdomen is soft.   Genitourinary: castaneda catheter   Musculoskeletal:         General: Normal range of motion.      Cervical back: Normal range of motion and neck supple.   Skin:     General: Skin is warm and dry.      Comments: Healed surgical incision    Neurological:      Mental Status: He is alert.      Comments: Awake, alert   Psychiatric:         Mood and Affect: Mood normal.         Behavior: Behavior normal.     Antibiotics  cefTRIAXone (Rocephin) IVPB 1 g  sodium chloride 0.9 % bolus 1,000 mL  acetaminophen (Tylenol) tablet 975 mg  ketorolac (Toradol) injection 15 mg  ondansetron (Zofran) injection 4 mg  iohexol (OMNIPaque) 350 mg iodine/mL solution 75 mL  sodium chloride 0.9 % bolus 1,000 mL  sodium chloride 0.9 % bolus 1,000 mL  vancomycin 1.5 g in 300 mL (Xellia) IVPB 1.5 g  tobramycin (Nebcin) 700 mg in dextrose 5% 100 mL IV  cefTRIAXone (Rocephin) IVPB 1 g  lactated Ringer's bolus 1,000  mL  docusate sodium (Colace) capsule 100 mg  enoxaparin (Lovenox) syringe 40 mg  methocarbamol (Robaxin) tablet 500 mg  polyethylene glycol (Glycolax, Miralax) packet 17 g  enoxaparin (Lovenox) syringe 40 mg  polyethylene glycol (Glycolax, Miralax) packet 17 g  cefTRIAXone (Rocephin) 2 g in dextrose 5% in water (D5W) 50 mL  acetaminophen (Tylenol) tablet 975 mg  meropenem (Merrem) 1 g IV in sodium chloride 0.9% 50 mL  vancomycin (Vancocin) pharmacy to dose - pharmacy monitoring  sodium chloride 0.9 % bolus 1,000 mL  vancomycin-diluent combo no.1 (Xellia) IVPB 2 g  sodium chloride 0.9% infusion  ibuprofen tablet 800 mg  norepinephrine (Levophed) 8 mg in dextrose 5% 250 mL (0.032 mg/mL) infusion (premix)  ketorolac (Toradol) injection 15 mg  lidocaine (Xylocaine) 10 mg/mL (1 %) injection 50 mg  alteplase (Cathflo Activase) injection 2 mg  meropenem (Merrem) in sodium chloride 0.9 % 100 mL IV 2 g  dextrose 5 % and lactated Ringer's with KCl 20 mEq/L infusion  acetaminophen (Tylenol) tablet 650 mg  perflutren lipid microspheres (Definity) injection 0.5-10 mL of dilution  sulfur hexafluoride microsphr (Lumason) injection 24.28 mg  perflutren protein A microsphere (Optison) injection 0.5 mL      Relevant Results  Labs  Results from last 72 hours   Lab Units 06/07/24  0535 06/06/24  0616   WBC AUTO x10*3/uL 5.9 7.9   HEMOGLOBIN g/dL 10.9* 10.7*   HEMATOCRIT % 34.2* 32.9*   PLATELETS AUTO x10*3/uL 138* 143*   NEUTROS PCT AUTO % 66.9 82.2   LYMPHS PCT AUTO % 20.6 10.0   MONOS PCT AUTO % 9.1 6.1   EOS PCT AUTO % 2.9 0.5     Results from last 72 hours   Lab Units 06/07/24  0535 06/06/24  0616   SODIUM mmol/L 141 138   POTASSIUM mmol/L 3.9 3.6   CHLORIDE mmol/L 106 106   CO2 mmol/L 26 22*   BUN mg/dL 7* 12   CREATININE mg/dL 0.60 0.70   GLUCOSE mg/dL 108* 98   CALCIUM mg/dL 8.5 8.3*   ANION GAP mmol/L 9 10   EGFR mL/min/1.73m*2 >90 >90     Results from last 72 hours   Lab Units 06/07/24  0535 06/06/24  0616   ALK PHOS U/L 104  "88   BILIRUBIN TOTAL mg/dL 0.9 1.6*   PROTEIN TOTAL g/dL 6.2 6.2   ALT U/L 154* 154*   AST U/L 78* 84*   ALBUMIN g/dL 3.1* 3.0*     Estimated Creatinine Clearance: 125 mL/min (by C-G formula based on SCr of 0.6 mg/dL).  No results found for: \"CRP\"  Microbiology  Susceptibility data from last 14 days.  Collected Specimen Info Organism Amikacin Amoxicillin/Clavulanate Ampicillin Ampicillin/Sulbactam Cefazolin Ceftriaxone Cefuroxime Ciprofloxacin Gentamicin Levofloxacin Piperacillin/Tazobactam Tobramycin   06/04/24 Blood culture from Peripheral Venipuncture Escherichia coli               06/04/24 Blood culture from Peripheral Venipuncture Escherichia coli  S  S  R  R  I  S  S  R  R  R  S  R     Collected Specimen Info Organism Trimethoprim/Sulfamethoxazole   06/04/24 Blood culture from Peripheral Venipuncture Escherichia coli    06/04/24 Blood culture from Peripheral Venipuncture Escherichia coli  S       Imaging  Transthoracic Echo (TTE) Complete    Result Date: 6/5/2024            [place] [address]             Phone [phone] TRANSTHORACIC ECHOCARDIOGRAM REPORT  Patient Name:      ABDIEL JACOB      Tyrell Physician:    [id number] Earl Romano DO Study Date:        6/5/2024             Ordering Provider:    [id number] LISSETTE HUMPHREY MRN/PID:           [id number]             Fellow: Accession#:        [id number]         Nurse: Date of Birth/Age: 2000 / 24 years Sonographer:          Izabel Olmos RDCS Gender:            M                    Additional Staff: Height:            190.50 cm            Admit Date: Weight:            198.68 kg            Admission Status:     Inpatient -                                                               Routine BSA / BMI:         3.06 m2 / " 54.75      [facility]                    kg/m2 Blood Pressure: 118 /81 mmHg Study Type:    TRANSTHORACIC ECHO (TTE) COMPLETE Diagnosis/ICD: Sepsis, unspecified organism-A41.9 Indication:    Severe sepsis CPT Codes:     Echo Complete w Full [id number] Patient History: Pertinent History: No cardiac Hx. Study Detail: The following Echo studies were performed: 2D, M-Mode, Doppler and               color flow. Image quality for this study is fair. Technically               challenging study due to poor acoustic windows and body habitus.               Definity used as a contrast agent for endocardial border               definition. Total contrast used for this procedure was 2 mL via IV               push.  PHYSICIAN INTERPRETATION: Left Ventricle: Left ventricular systolic function is hyperdynamic, with an estimated ejection fraction of 70-75%. There are no regional wall motion abnormalities. The left ventricular cavity size is normal. Spectral Doppler shows a normal pattern of left ventricular diastolic filling. Left Atrium: The left atrium is normal in size. Right Ventricle: The right ventricle is normal in size. There is normal right ventricular global systolic function. Right Atrium: The right atrium is normal in size. Aortic Valve: The aortic valve appears structurally normal. There is no evidence of aortic valve regurgitation. The peak instantaneous gradient of the aortic valve is 9.4 mmHg. The mean gradient of the aortic valve is 5.0 mmHg. Mitral Valve: The mitral valve is normal in structure. There is no evidence of mitral valve regurgitation. Tricuspid Valve: The tricuspid valve is structurally normal. No evidence of tricuspid regurgitation. The right ventricular systolic pressure is unable to be estimated. Pulmonic Valve: The pulmonic valve is structurally normal. There is no indication of pulmonic valve regurgitation. Pericardium: There is no pericardial effusion noted. Aorta: The  aortic root is normal.  CONCLUSIONS:  1. Left ventricular systolic function is hyperdynamic with a 70-75% estimated ejection fraction. QUANTITATIVE DATA SUMMARY: M-MODE MEASUREMENTS:                  Normal Ranges: Ao Root: 2.40 cm (2.0-3.7cm) LAs:     3.60 cm (2.7-4.0cm) AORTIC VALVE:                                   Normal Ranges: AoV Vmax:                1.53 m/s (<=1.7m/s) AoV Peak P.4 mmHg (<20mmHg) AoV Mean P.0 mmHg (1.7-11.5mmHg) LVOT Max Mike:            1.23 m/s (<=1.1m/s) AoV VTI:                 23.60 cm (18-25cm) LVOT VTI:                19.70 cm LVOT Diameter:           2.20 cm  (1.8-2.4cm) AoV Area, VTI:           3.17 cm2 (2.5-5.5cm2) AoV Area,Vmax:           3.06 cm2 (2.5-4.5cm2) AoV Dimensionless Index: 0.83 PULMONIC VALVE:                         Normal Ranges: PV Accel Time: 98 msec  (>120ms) PV Max Mike:    1.4 m/s  (0.6-0.9m/s) PV Max P.5 mmHg  [id number] Earl Romano DO Electronically signed on 2024 at 5:31:10 PM  ** Final **     ECG 12 Lead    Result Date: 2024  Sinus tachycardia Otherwise normal ECG When compared with ECG of 01-MAY-2024 03:01, No significant change was found Confirmed by Earl Romano ([id number]) on 2024 10:59:53 AM    XR chest 1 view    Result Date: 2024  Interpreted By:  Isaias Wing, STUDY: XR CHEST 1 VIEW;  2024 3:15 am   INDICATION: Signs/Symptoms:sob.   COMPARISON: 2024   ACCESSION NUMBER(S): [id number]   ORDERING CLINICIAN: MANJINDER ODONNELL   FINDINGS:     The cardiomediastinal silhouette and pulmonary vasculature are within normal limits. No consolidation, pleural effusion or pneumothorax.         No acute cardiopulmonary process.     MACRO: None.   Signed by: Isaias Wing 2024 3:35 AM Dictation workstation:   [id number]    CT abdomen pelvis w IV contrast    Result Date: 2024  Interpreted By:  Julianna Tolliver, STUDY: CT ABDOMEN PELVIS W IV CONTRAST;  2024 3:03 pm   INDICATION:  Signs/Symptoms:abdominal pain, hematuria, bandemia.   COMPARISON: None.   ACCESSION NUMBER(S): [id number]   ORDERING CLINICIAN: VIDHYA WYATT   TECHNIQUE: CT of the abdomen and pelvis was performed.  75 mL Omnipaque 350   FINDINGS: LOWER CHEST: Images of the lung bases show no infiltrate or pleural fluid. There is flame shaped tissue in the retroareolar region of the left breast, likely unilateral gynecomastia.   ABDOMEN:   LIVER: There is no hepatic mass. There is fatty infiltration of the liver.   BILE DUCTS: There is no intrahepatic, common hepatic or common bile ductal dilatation.   GALLBLADDER: The gallbladder is unremarkable.   PANCREAS: The pancreas is unremarkable.   SPLEEN: The spleen is unremarkable. There is no splenic mass or splenomegaly.   ADRENAL GLANDS: The adrenal glands are unremarkable.   KIDNEYS AND URETERS: The kidneys function symmetrically. The kidneys demonstrate no mass. There is no intrarenal calculus or hydronephrosis. There is a Godoy catheter insufflated in the bladder. There is hyperdense fluid in the bladder. This is consistent with blood. This could be secondary to hemorrhagic cystitis on or iatrogenic from Godoy catheter placement.   BOWEL: There is no bowel wall thickening, dilatation or obstruction. The appendix is normal.   VESSELS: The abdominal and pelvic vessels are unremarkable.   PERITONEUM/RETROPERITONEUM/LYMPH NODES: There is no retroperitoneal or pelvic adenopathy.  There is no ascites.   ABDOMINAL WALL: The abdominal wall is unremarkable.   BONE AND SOFT TISSUE: There is no acute osseous finding. Status post partial resection spinous process L4   There is no soft tissue abnormality.       1. Godoy catheter insufflated in the bladder. There is hyperdense material in the bladder lumen. This is either iatrogenic from catheter placement or hemorrhagic cystitis. 2. Fatty infiltration of the liver.   MACRO: None   Signed by: Julianna Tolliver 6/4/2024 3:32 PM Dictation  workstation:   [id number]           Assessment/Plan   Sepsis secondary to gram negative bacteremia  Acute kidney injury, resolving  Fever, secondary to below  E. Coli bacteremia likely secondary to UTI-susceptible to cefuroxime, ceftriaxone   Hematuria, likely secondary to UTI, castaneda catheter   Pyuria versus urinary tract infection-culture no growth   Status post Recent spine surgery  Leukocytosis, resolved     IV meropenem  Consider addition IV vancomycin if clinically worsens  Monitor renal function  Follow up repeat blood culture  Monitor temperature and WBC  CT lumbar spine with contrast  Consider Orthopedic spine  surgery consult     Total time spent caring for the patient today was 20 minutes. This includes time spent before the visit reviewing the chart, time spent during the visit, and time spent after the visit on documentation     Catalina Tracey, MARCELINO-CNP

## 2024-06-09 LAB
ANION GAP SERPL CALC-SCNC: 12 MMOL/L
BUN SERPL-MCNC: 9 MG/DL (ref 8–25)
CALCIUM SERPL-MCNC: 8.9 MG/DL (ref 8.5–10.4)
CHLORIDE SERPL-SCNC: 105 MMOL/L (ref 97–107)
CO2 SERPL-SCNC: 24 MMOL/L (ref 24–31)
CREAT SERPL-MCNC: 0.5 MG/DL (ref 0.4–1.6)
EGFRCR SERPLBLD CKD-EPI 2021: >90 ML/MIN/1.73M*2
ERYTHROCYTE [DISTWIDTH] IN BLOOD BY AUTOMATED COUNT: 14.7 % (ref 11.5–14.5)
GLUCOSE SERPL-MCNC: 86 MG/DL (ref 65–99)
HCT VFR BLD AUTO: 35.9 % (ref 41–52)
HGB BLD-MCNC: 11.5 G/DL (ref 13.5–17.5)
MCH RBC QN AUTO: 27.4 PG (ref 26–34)
MCHC RBC AUTO-ENTMCNC: 32 G/DL (ref 32–36)
MCV RBC AUTO: 86 FL (ref 80–100)
NRBC BLD-RTO: 0 /100 WBCS (ref 0–0)
PLATELET # BLD AUTO: 182 X10*3/UL (ref 150–450)
POTASSIUM SERPL-SCNC: 4.1 MMOL/L (ref 3.4–5.1)
RBC # BLD AUTO: 4.2 X10*6/UL (ref 4.5–5.9)
SODIUM SERPL-SCNC: 141 MMOL/L (ref 133–145)
WBC # BLD AUTO: 6.5 X10*3/UL (ref 4.4–11.3)

## 2024-06-09 PROCEDURE — 36415 COLL VENOUS BLD VENIPUNCTURE: CPT | Performed by: FAMILY MEDICINE

## 2024-06-09 PROCEDURE — 94760 N-INVAS EAR/PLS OXIMETRY 1: CPT

## 2024-06-09 PROCEDURE — 2500000004 HC RX 250 GENERAL PHARMACY W/ HCPCS (ALT 636 FOR OP/ED): Performed by: INTERNAL MEDICINE

## 2024-06-09 PROCEDURE — 85027 COMPLETE CBC AUTOMATED: CPT | Performed by: FAMILY MEDICINE

## 2024-06-09 PROCEDURE — 82374 ASSAY BLOOD CARBON DIOXIDE: CPT | Performed by: FAMILY MEDICINE

## 2024-06-09 PROCEDURE — 1100000001 HC PRIVATE ROOM DAILY

## 2024-06-09 PROCEDURE — 2500000004 HC RX 250 GENERAL PHARMACY W/ HCPCS (ALT 636 FOR OP/ED): Performed by: NURSE PRACTITIONER

## 2024-06-09 PROCEDURE — 9420000001 HC RT PATIENT EDUCATION 5 MIN

## 2024-06-09 PROCEDURE — 2500000001 HC RX 250 WO HCPCS SELF ADMINISTERED DRUGS (ALT 637 FOR MEDICARE OP): Performed by: NURSE PRACTITIONER

## 2024-06-09 RX ORDER — CEFTRIAXONE 2 G/50ML
2 INJECTION, SOLUTION INTRAVENOUS EVERY 24 HOURS
Status: DISCONTINUED | OUTPATIENT
Start: 2024-06-09 | End: 2024-06-11 | Stop reason: HOSPADM

## 2024-06-09 RX ADMIN — DOCUSATE SODIUM 100 MG: 100 CAPSULE, LIQUID FILLED ORAL at 20:33

## 2024-06-09 RX ADMIN — DOCUSATE SODIUM 100 MG: 100 CAPSULE, LIQUID FILLED ORAL at 10:09

## 2024-06-09 RX ADMIN — MEROPENEM 2 G: 2 INJECTION, POWDER, FOR SOLUTION INTRAVENOUS at 10:08

## 2024-06-09 RX ADMIN — POLYETHYLENE GLYCOL 3350 17 G: 17 POWDER, FOR SOLUTION ORAL at 10:09

## 2024-06-09 RX ADMIN — CEFTRIAXONE SODIUM 2 G: 2 INJECTION, SOLUTION INTRAVENOUS at 16:19

## 2024-06-09 RX ADMIN — MEROPENEM 2 G: 2 INJECTION, POWDER, FOR SOLUTION INTRAVENOUS at 03:29

## 2024-06-09 RX ADMIN — ENOXAPARIN SODIUM 40 MG: 40 INJECTION SUBCUTANEOUS at 05:07

## 2024-06-09 ASSESSMENT — COGNITIVE AND FUNCTIONAL STATUS - GENERAL
MOBILITY SCORE: 18
DRESSING REGULAR LOWER BODY CLOTHING: A LOT
DRESSING REGULAR LOWER BODY CLOTHING: A LOT
MOVING FROM LYING ON BACK TO SITTING ON SIDE OF FLAT BED WITH BEDRAILS: A LITTLE
DAILY ACTIVITIY SCORE: 16
HELP NEEDED FOR BATHING: A LOT
HELP NEEDED FOR BATHING: A LOT
DAILY ACTIVITIY SCORE: 15
DRESSING REGULAR UPPER BODY CLOTHING: A LITTLE
MOVING TO AND FROM BED TO CHAIR: A LITTLE
PERSONAL GROOMING: A LITTLE
WALKING IN HOSPITAL ROOM: A LITTLE
TURNING FROM BACK TO SIDE WHILE IN FLAT BAD: A LITTLE
WALKING IN HOSPITAL ROOM: A LITTLE
DRESSING REGULAR LOWER BODY CLOTHING: A LOT
CLIMB 3 TO 5 STEPS WITH RAILING: A LITTLE
TURNING FROM BACK TO SIDE WHILE IN FLAT BAD: A LITTLE
MOVING TO AND FROM BED TO CHAIR: A LITTLE
STANDING UP FROM CHAIR USING ARMS: A LITTLE
PERSONAL GROOMING: A LITTLE
HELP NEEDED FOR BATHING: A LOT
CLIMB 3 TO 5 STEPS WITH RAILING: A LITTLE
MOVING FROM LYING ON BACK TO SITTING ON SIDE OF FLAT BED WITH BEDRAILS: A LITTLE
MOBILITY SCORE: 18
CLIMB 3 TO 5 STEPS WITH RAILING: A LITTLE
PERSONAL GROOMING: A LITTLE
TOILETING: A LOT
MOVING FROM LYING ON BACK TO SITTING ON SIDE OF FLAT BED WITH BEDRAILS: A LITTLE
STANDING UP FROM CHAIR USING ARMS: A LITTLE
WALKING IN HOSPITAL ROOM: A LITTLE
MOVING TO AND FROM BED TO CHAIR: A LITTLE
TOILETING: TOTAL
TOILETING: TOTAL
DAILY ACTIVITIY SCORE: 15
DRESSING REGULAR UPPER BODY CLOTHING: A LITTLE
MOBILITY SCORE: 18
DRESSING REGULAR UPPER BODY CLOTHING: A LITTLE
TURNING FROM BACK TO SIDE WHILE IN FLAT BAD: A LITTLE
STANDING UP FROM CHAIR USING ARMS: A LITTLE

## 2024-06-09 ASSESSMENT — PAIN SCALES - GENERAL
PAINLEVEL_OUTOF10: 0 - NO PAIN

## 2024-06-09 NOTE — PROGRESS NOTES
Erwin Cline is a 24 y.o. male on day 5 of admission presenting with E coli bacteremia.    Subjective   Interval History:   Afebrile  Resting comfortably    Review of Systems   All other systems reviewed and are negative.      Objective   Range of Vitals (last 24 hours)  Heart Rate:  [69-80]   Temp:  [36.4 °C (97.5 °F)-37 °C (98.6 °F)]   Resp:  [16-17]   BP: ()/(35-74)   SpO2:  [94 %-99 %]   Daily Weight  06/06/24 : (!) 204 kg (449 lb)    Body mass index is 56.12 kg/m².    Physical Exam  Constitutional:       Appearance: Normal appearance.   HENT:      Head: Normocephalic and atraumatic.      Nose: Nose normal.      Mouth/Throat:      Mouth: Mucous membranes are moist.      Pharynx: Oropharynx is clear.   Eyes:      Conjunctiva/sclera: Conjunctivae normal.   Cardiovascular:      Rate and Rhythm: Regular rhythm present.   Pulmonary:      Effort: Pulmonary effort is normal.      Breath sounds: Normal breath sounds.   Abdominal:      General: Bowel sounds are normal.      Palpations: Abdomen is soft.   Genitourinary: castaneda catheter   Musculoskeletal:         General: Normal range of motion.      Cervical back: Normal range of motion and neck supple.   Skin:     General: Skin is warm and dry.      Comments: Healed surgical incision    Neurological:      Mental Status: He is alert.      Comments: Awake, alert   Psychiatric:         Mood and Affect: Mood normal.         Behavior: Behavior normal.     Antibiotics  cefTRIAXone (Rocephin) IVPB 1 g  sodium chloride 0.9 % bolus 1,000 mL  acetaminophen (Tylenol) tablet 975 mg  ketorolac (Toradol) injection 15 mg  ondansetron (Zofran) injection 4 mg  iohexol (OMNIPaque) 350 mg iodine/mL solution 75 mL  sodium chloride 0.9 % bolus 1,000 mL  sodium chloride 0.9 % bolus 1,000 mL  vancomycin 1.5 g in 300 mL (Xellia) IVPB 1.5 g  tobramycin (Nebcin) 700 mg in dextrose 5% 100 mL IV  cefTRIAXone (Rocephin) IVPB 1 g  lactated Ringer's bolus 1,000 mL  docusate sodium  (Colace) capsule 100 mg  enoxaparin (Lovenox) syringe 40 mg  methocarbamol (Robaxin) tablet 500 mg  polyethylene glycol (Glycolax, Miralax) packet 17 g  enoxaparin (Lovenox) syringe 40 mg  polyethylene glycol (Glycolax, Miralax) packet 17 g  cefTRIAXone (Rocephin) 2 g in dextrose 5% in water (D5W) 50 mL  acetaminophen (Tylenol) tablet 975 mg  meropenem (Merrem) 1 g IV in sodium chloride 0.9% 50 mL  vancomycin (Vancocin) pharmacy to dose - pharmacy monitoring  sodium chloride 0.9 % bolus 1,000 mL  vancomycin-diluent combo no.1 (Xellia) IVPB 2 g  sodium chloride 0.9% infusion  ibuprofen tablet 800 mg  norepinephrine (Levophed) 8 mg in dextrose 5% 250 mL (0.032 mg/mL) infusion (premix)  ketorolac (Toradol) injection 15 mg  lidocaine (Xylocaine) 10 mg/mL (1 %) injection 50 mg  alteplase (Cathflo Activase) injection 2 mg  meropenem (Merrem) in sodium chloride 0.9 % 100 mL IV 2 g  dextrose 5 % and lactated Ringer's with KCl 20 mEq/L infusion  acetaminophen (Tylenol) tablet 650 mg  perflutren lipid microspheres (Definity) injection 0.5-10 mL of dilution  sulfur hexafluoride microsphr (Lumason) injection 24.28 mg  perflutren protein A microsphere (Optison) injection 0.5 mL  traZODone (Desyrel) tablet 50 mg  cefTRIAXone (Rocephin) 2 g in dextrose 5% in water (D5W) 50 mL      Relevant Results  Labs  Results from last 72 hours   Lab Units 06/09/24  0439 06/07/24  0535   WBC AUTO x10*3/uL 6.5 5.9   HEMOGLOBIN g/dL 11.5* 10.9*   HEMATOCRIT % 35.9* 34.2*   PLATELETS AUTO x10*3/uL 182 138*   NEUTROS PCT AUTO %  --  66.9   LYMPHS PCT AUTO %  --  20.6   MONOS PCT AUTO %  --  9.1   EOS PCT AUTO %  --  2.9     Results from last 72 hours   Lab Units 06/09/24  0439 06/07/24  0535   SODIUM mmol/L 141 141   POTASSIUM mmol/L 4.1 3.9   CHLORIDE mmol/L 105 106   CO2 mmol/L 24 26   BUN mg/dL 9 7*   CREATININE mg/dL 0.50 0.60   GLUCOSE mg/dL 86 108*   CALCIUM mg/dL 8.9 8.5   ANION GAP mmol/L 12 9   EGFR mL/min/1.73m*2 >90 >90     Results from  "last 72 hours   Lab Units 06/07/24  0535   ALK PHOS U/L 104   BILIRUBIN TOTAL mg/dL 0.9   PROTEIN TOTAL g/dL 6.2   ALT U/L 154*   AST U/L 78*   ALBUMIN g/dL 3.1*     Estimated Creatinine Clearance: 125 mL/min (by C-G formula based on SCr of 0.5 mg/dL).  No results found for: \"CRP\"  Microbiology  Susceptibility data from last 14 days.  Collected Specimen Info Organism Amikacin Amoxicillin/Clavulanate Ampicillin Ampicillin/Sulbactam Cefazolin Ceftriaxone Cefuroxime Ciprofloxacin Gentamicin Levofloxacin Piperacillin/Tazobactam Tobramycin   06/04/24 Blood culture from Peripheral Venipuncture Escherichia coli               06/04/24 Blood culture from Peripheral Venipuncture Escherichia coli  S  S  R  R  I  S  S  R  R  R  S  R     Collected Specimen Info Organism Trimethoprim/Sulfamethoxazole   06/04/24 Blood culture from Peripheral Venipuncture Escherichia coli    06/04/24 Blood culture from Peripheral Venipuncture Escherichia coli  S     Imaging  CT lumbar spine wo IV contrast    Result Date: 6/8/2024  Interpreted By:  Derrick Casas, STUDY: CT LUMBAR SPINE WO IV CONTRAST;  6/7/2024 9:01 pm   INDICATION: Signs/Symptoms:rule out lumbar spine infection, unable to perform MRI.   COMPARISON: CT lumbar spine 04/30/2024   ACCESSION NUMBER(S): [id number]   ORDERING CLINICIAN: HERIBERTO ARROYO   TECHNIQUE: Axial CT images of the lumbar spine are obtained. Axial, coronal and sagittal reconstructions are submitted for review.   FINDINGS: Postoperative changes L4-L5 laminectomy.   Trauma: No acute fracture.   Alignment: Straightening of the lumbar lordosis.   Vertebral Body Heights: The lumbar vertebral body heights are intact.   Intervertebral Discs:  The disc spaces are grossly preserved.   Degenerative change: Postoperative changes L4-L5 laminectomy, since 04/30/2024. There is stable canal stenosis bilateral neural foraminal narrowing. Redemonstration sequela of congenital lumbar stenosis from shortened pedicles.   " Paraspinous Soft Tissues: Mild subcutaneous edema, likely secondary to some postoperative changes..       Postoperative changes L4-L5 laminectomy, since 04/30/2024. Diffuse subcutaneous edema most prominent at L4-L5. No drainable fluid collection. Within limits of evaluation, no evidence of discitis/osteomyelitis.   Signed by: Derrick Casas 6/8/2024 4:10 PM Dictation workstation:   [id number]    Transthoracic Echo (TTE) Complete    Result Date: 6/5/2024            [facility] [address] , [place] [id number]             Phone [phone] TRANSTHORACIC ECHOCARDIOGRAM REPORT  Patient Name:      ABDIEL JACOB      Reading Physician:    [id number] Earl Romano DO Study Date:        6/5/2024             Ordering Provider:    [id number] LISSETTE HUMPHREY MRN/PID:           [id number]             Fellow: Accession#:        [id number]         Nurse: Date of Birth/Age: 2000 / 24 years Sonographer:          Izabel Olmos RDCS Gender:            M                    Additional Staff: Height:            190.50 cm            Admit Date: Weight:            198.68 kg            Admission Status:     Inpatient -                                                               Routine BSA / BMI:         3.06 m2 / 54.75      Department Location:  [facility]                    kg/m2 Blood Pressure: 118 /81 mmHg Study Type:    TRANSTHORACIC ECHO (TTE) COMPLETE Diagnosis/ICD: Sepsis, unspecified organism-A41.9 Indication:    Severe sepsis CPT Codes:     Echo Complete w Full [id number] Patient History: Pertinent History: No cardiac Hx. Study Detail: The following Echo studies were performed: 2D, M-Mode, Doppler and               color flow. Image quality for this study is fair. Technically               challenging study due  to poor acoustic windows and body habitus.               Definity used as a contrast agent for endocardial border               definition. Total contrast used for this procedure was 2 mL via IV               push.  PHYSICIAN INTERPRETATION: Left Ventricle: Left ventricular systolic function is hyperdynamic, with an estimated ejection fraction of 70-75%. There are no regional wall motion abnormalities. The left ventricular cavity size is normal. Spectral Doppler shows a normal pattern of left ventricular diastolic filling. Left Atrium: The left atrium is normal in size. Right Ventricle: The right ventricle is normal in size. There is normal right ventricular global systolic function. Right Atrium: The right atrium is normal in size. Aortic Valve: The aortic valve appears structurally normal. There is no evidence of aortic valve regurgitation. The peak instantaneous gradient of the aortic valve is 9.4 mmHg. The mean gradient of the aortic valve is 5.0 mmHg. Mitral Valve: The mitral valve is normal in structure. There is no evidence of mitral valve regurgitation. Tricuspid Valve: The tricuspid valve is structurally normal. No evidence of tricuspid regurgitation. The right ventricular systolic pressure is unable to be estimated. Pulmonic Valve: The pulmonic valve is structurally normal. There is no indication of pulmonic valve regurgitation. Pericardium: There is no pericardial effusion noted. Aorta: The aortic root is normal.  CONCLUSIONS:  1. Left ventricular systolic function is hyperdynamic with a 70-75% estimated ejection fraction. QUANTITATIVE DATA SUMMARY: M-MODE MEASUREMENTS:                  Normal Ranges: Ao Root: 2.40 cm (2.0-3.7cm) LAs:     3.60 cm (2.7-4.0cm) AORTIC VALVE:                                   Normal Ranges: AoV Vmax:                1.53 m/s (<=1.7m/s) AoV Peak P.4 mmHg (<20mmHg) AoV Mean P.0 mmHg (1.7-11.5mmHg) LVOT Max Mike:            1.23 m/s (<=1.1m/s) AoV  VTI:                 23.60 cm (18-25cm) LVOT VTI:                19.70 cm LVOT Diameter:           2.20 cm  (1.8-2.4cm) AoV Area, VTI:           3.17 cm2 (2.5-5.5cm2) AoV Area,Vmax:           3.06 cm2 (2.5-4.5cm2) AoV Dimensionless Index: 0.83 PULMONIC VALVE:                         Normal Ranges: PV Accel Time: 98 msec  (>120ms) PV Max Mike:    1.4 m/s  (0.6-0.9m/s) PV Max P.5 mmHg  [id number] Earl Romano DO Electronically signed on 2024 at 5:31:10 PM  ** Final **     ECG 12 Lead    Result Date: 2024  Sinus tachycardia Otherwise normal ECG When compared with ECG of 01-MAY-2024 03:01, No significant change was found Confirmed by Earl Romano ([id number]) on 2024 10:59:53 AM    XR chest 1 view    Result Date: 2024  Interpreted By:  Isaias Wing, STUDY: XR CHEST 1 VIEW;  2024 3:15 am   INDICATION: Signs/Symptoms:sob.   COMPARISON: 2024   ACCESSION NUMBER(S): [id number]   ORDERING CLINICIAN: MANJINDER ODONNELL   FINDINGS:     The cardiomediastinal silhouette and pulmonary vasculature are within normal limits. No consolidation, pleural effusion or pneumothorax.         No acute cardiopulmonary process.     MACRO: None.   Signed by: Isaias Wing 2024 3:35 AM Dictation workstation:   [id number]    CT abdomen pelvis w IV contrast    Result Date: 2024  Interpreted By:  Julianna Tolliver, STUDY: CT ABDOMEN PELVIS W IV CONTRAST;  2024 3:03 pm   INDICATION: Signs/Symptoms:abdominal pain, hematuria, bandemia.   COMPARISON: None.   ACCESSION NUMBER(S): [id number]   ORDERING CLINICIAN: VIDHYA WYATT   TECHNIQUE: CT of the abdomen and pelvis was performed.  75 mL Omnipaque 350   FINDINGS: LOWER CHEST: Images of the lung bases show no infiltrate or pleural fluid. There is flame shaped tissue in the retroareolar region of the left breast, likely unilateral gynecomastia.   ABDOMEN:   LIVER: There is no hepatic mass. There is fatty infiltration of the liver.   BILE DUCTS: There is no  intrahepatic, common hepatic or common bile ductal dilatation.   GALLBLADDER: The gallbladder is unremarkable.   PANCREAS: The pancreas is unremarkable.   SPLEEN: The spleen is unremarkable. There is no splenic mass or splenomegaly.   ADRENAL GLANDS: The adrenal glands are unremarkable.   KIDNEYS AND URETERS: The kidneys function symmetrically. The kidneys demonstrate no mass. There is no intrarenal calculus or hydronephrosis. There is a Castaneda catheter insufflated in the bladder. There is hyperdense fluid in the bladder. This is consistent with blood. This could be secondary to hemorrhagic cystitis on or iatrogenic from Castaneda catheter placement.   BOWEL: There is no bowel wall thickening, dilatation or obstruction. The appendix is normal.   VESSELS: The abdominal and pelvic vessels are unremarkable.   PERITONEUM/RETROPERITONEUM/LYMPH NODES: There is no retroperitoneal or pelvic adenopathy.  There is no ascites.   ABDOMINAL WALL: The abdominal wall is unremarkable.   BONE AND SOFT TISSUE: There is no acute osseous finding. Status post partial resection spinous process L4   There is no soft tissue abnormality.       1. Castaneda catheter insufflated in the bladder. There is hyperdense material in the bladder lumen. This is either iatrogenic from catheter placement or hemorrhagic cystitis. 2. Fatty infiltration of the liver.   MACRO: None   Signed by: Julianna Tolliver 6/4/2024 3:32 PM Dictation workstation:   [id number]     Assessment/Plan   Sepsis secondary to gram negative bacteremia, resolved  Acute kidney injury, resolved  Fever, secondary to below, resolved  E. Coli bacteremia likely secondary to UTI-susceptible to cefuroxime, ceftriaxone   Hematuria, likely secondary to UTI, castaneda catheter   Pyuria versus urinary tract infection-culture no growth   Status post Recent spine surgery  Leukocytosis, resolved     IV ceftriaxone  Monitor renal function  Follow up repeat blood culture  Monitor temperature and WBC  Long-term  plan is 14 days of antibiotic therapy, can de-escalate to cefuroxime      Cedric Cobrun MD

## 2024-06-09 NOTE — CARE PLAN
The patient's goals for the shift include receive IV antibx    The clinical goals for the shift include safety      Problem: Skin  Goal: Prevent/manage excess moisture  Outcome: Progressing  Flowsheets (Taken 6/9/2024 0426)  Prevent/manage excess moisture: Cleanse incontinence/protect with barrier cream     Problem: Fall/Injury  Goal: Be free from injury by end of the shift  Outcome: Progressing       Over the shift, the patient did make progress toward the following goals.

## 2024-06-09 NOTE — CARE PLAN
The patient's goals for the shift include receive IV antibx    The clinical goals for the shift include safety    Problem: Pain - Adult  Goal: Verbalizes/displays adequate comfort level or baseline comfort level  Outcome: Progressing     Problem: Chronic Conditions and Co-morbidities  Goal: Patient's chronic conditions and co-morbidity symptoms are monitored and maintained or improved  Outcome: Progressing     Problem: Skin  Goal: Participates in plan/prevention/treatment measures  Outcome: Progressing  Goal: Prevent/manage excess moisture  Outcome: Progressing  Goal: Prevent/minimize sheer/friction injuries  Outcome: Progressing  Goal: Promote/optimize nutrition  Outcome: Progressing  Goal: Promote skin healing  Outcome: Progressing     Problem: Fall/Injury  Goal: Not fall by end of shift  Outcome: Progressing  Goal: Be free from injury by end of the shift  Outcome: Progressing  Goal: Verbalize understanding of personal risk factors for fall in the hospital  Outcome: Progressing  Goal: Verbalize understanding of risk factor reduction measures to prevent injury from fall in the home  Outcome: Progressing  Goal: Use assistive devices by end of the shift  Outcome: Progressing  Goal: Pace activities to prevent fatigue by end of the shift  Outcome: Progressing

## 2024-06-09 NOTE — PROGRESS NOTES
"Erwin Cline is a 24 y.o. male on day 5 of admission presenting with E coli bacteremia.    Subjective   Denies chest pain or shortness of breath    Objective     Physical Exam  Vitals and nursing note reviewed.   Constitutional:       Appearance: He is obese.   HENT:      Head: Normocephalic and atraumatic.      Mouth/Throat:      Mouth: Mucous membranes are moist.   Eyes:      Extraocular Movements: Extraocular movements intact.   Cardiovascular:      Rate and Rhythm: Normal rate and regular rhythm.      Pulses: Normal pulses.      Heart sounds: Normal heart sounds.   Pulmonary:      Effort: Pulmonary effort is normal.      Breath sounds: Normal breath sounds.   Abdominal:      General: Abdomen is flat.      Palpations: Abdomen is soft.   Musculoskeletal:         General: Normal range of motion.      Cervical back: Normal range of motion and neck supple.   Skin:     General: Skin is warm and dry.      Capillary Refill: Capillary refill takes less than 2 seconds.   Neurological:      General: No focal deficit present.      Mental Status: He is alert and oriented to person, place, and time. Mental status is at baseline.   Psychiatric:         Mood and Affect: Mood normal.         Last Recorded Vitals  Blood pressure 143/74, pulse 78, temperature 36.4 °C (97.5 °F), temperature source Oral, resp. rate 16, height 1.905 m (6' 3\"), weight (!) 204 kg (449 lb), SpO2 99%.  Intake/Output last 3 Shifts:  I/O last 3 completed shifts:  In: - (0 mL/kg)   Out: 5200 (25.5 mL/kg) [Urine:5200 (0.7 mL/kg/hr)]  Weight: 203.7 kg     Relevant Results        Susceptibility data from last 90 days.  Collected Specimen Info Organism Amikacin Amoxicillin/Clavulanate Ampicillin Ampicillin/Sulbactam Cefazolin Ceftriaxone Cefuroxime Ciprofloxacin Gentamicin Levofloxacin Piperacillin/Tazobactam Tobramycin   06/04/24 Blood culture from Peripheral Venipuncture Escherichia coli               06/04/24 Blood culture from Peripheral " Venipuncture Escherichia coli  S  S  R  R  I  S  S  R  R  R  S  R     Collected Specimen Info Organism Trimethoprim/Sulfamethoxazole   06/04/24 Blood culture from Peripheral Venipuncture Escherichia coli    06/04/24 Blood culture from Peripheral Venipuncture Escherichia coli  S     Scheduled medications  acetaminophen, 975 mg, oral, Once  docusate sodium, 100 mg, oral, BID  enoxaparin, 40 mg, subcutaneous, Daily  lidocaine, 5 mL, infiltration, Once  meropenem, 2 g, intravenous, q8h      Continuous medications       PRN medications  PRN medications: acetaminophen, methocarbamol, polyethylene glycol, traZODone      Assessment/Plan   E. coli sepsis with septic shock  Morbid obesity  Gross hematuria    Plan:  Insert PICC line for extended IV antimicrobial therapy  IV meropenem per infectious disease  DVT prophylaxis  CT of the lumbar spine showed no acute findings      Jaqueline Aly MD

## 2024-06-10 ENCOUNTER — PHARMACY VISIT (OUTPATIENT)
Dept: PHARMACY | Facility: CLINIC | Age: 24
End: 2024-06-10
Payer: MEDICARE

## 2024-06-10 LAB
BACTERIA BLD CULT: NORMAL
BACTERIA BLD CULT: NORMAL

## 2024-06-10 PROCEDURE — RXMED WILLOW AMBULATORY MEDICATION CHARGE

## 2024-06-10 PROCEDURE — 2500000004 HC RX 250 GENERAL PHARMACY W/ HCPCS (ALT 636 FOR OP/ED): Performed by: NURSE PRACTITIONER

## 2024-06-10 PROCEDURE — 2500000004 HC RX 250 GENERAL PHARMACY W/ HCPCS (ALT 636 FOR OP/ED): Performed by: INTERNAL MEDICINE

## 2024-06-10 PROCEDURE — 1100000001 HC PRIVATE ROOM DAILY

## 2024-06-10 PROCEDURE — 2500000001 HC RX 250 WO HCPCS SELF ADMINISTERED DRUGS (ALT 637 FOR MEDICARE OP): Performed by: NURSE PRACTITIONER

## 2024-06-10 RX ORDER — CEFUROXIME AXETIL 500 MG/1
500 TABLET ORAL 2 TIMES DAILY
Qty: 20 TABLET | Refills: 0 | Status: SHIPPED | OUTPATIENT
Start: 2024-06-10

## 2024-06-10 RX ADMIN — CEFTRIAXONE SODIUM 2 G: 2 INJECTION, SOLUTION INTRAVENOUS at 15:20

## 2024-06-10 RX ADMIN — DOCUSATE SODIUM 100 MG: 100 CAPSULE, LIQUID FILLED ORAL at 09:55

## 2024-06-10 RX ADMIN — POLYETHYLENE GLYCOL 3350 17 G: 17 POWDER, FOR SOLUTION ORAL at 09:55

## 2024-06-10 RX ADMIN — ENOXAPARIN SODIUM 40 MG: 40 INJECTION SUBCUTANEOUS at 06:23

## 2024-06-10 RX ADMIN — DOCUSATE SODIUM 100 MG: 100 CAPSULE, LIQUID FILLED ORAL at 20:19

## 2024-06-10 ASSESSMENT — COGNITIVE AND FUNCTIONAL STATUS - GENERAL
HELP NEEDED FOR BATHING: A LOT
MOVING FROM LYING ON BACK TO SITTING ON SIDE OF FLAT BED WITH BEDRAILS: A LITTLE
DRESSING REGULAR UPPER BODY CLOTHING: A LITTLE
DRESSING REGULAR UPPER BODY CLOTHING: A LITTLE
DRESSING REGULAR LOWER BODY CLOTHING: A LOT
PERSONAL GROOMING: A LITTLE
DAILY ACTIVITIY SCORE: 16
TURNING FROM BACK TO SIDE WHILE IN FLAT BAD: A LITTLE
TOILETING: A LOT
STANDING UP FROM CHAIR USING ARMS: A LITTLE
STANDING UP FROM CHAIR USING ARMS: A LITTLE
TOILETING: A LOT
CLIMB 3 TO 5 STEPS WITH RAILING: A LITTLE
MOVING FROM LYING ON BACK TO SITTING ON SIDE OF FLAT BED WITH BEDRAILS: A LITTLE
WALKING IN HOSPITAL ROOM: A LITTLE
DRESSING REGULAR LOWER BODY CLOTHING: A LOT
WALKING IN HOSPITAL ROOM: A LITTLE
HELP NEEDED FOR BATHING: A LOT
MOBILITY SCORE: 18
TURNING FROM BACK TO SIDE WHILE IN FLAT BAD: A LITTLE
MOBILITY SCORE: 18
MOVING TO AND FROM BED TO CHAIR: A LITTLE
DAILY ACTIVITIY SCORE: 16
MOVING TO AND FROM BED TO CHAIR: A LITTLE
CLIMB 3 TO 5 STEPS WITH RAILING: A LITTLE
PERSONAL GROOMING: A LITTLE

## 2024-06-10 ASSESSMENT — PAIN SCALES - GENERAL
PAINLEVEL_OUTOF10: 0 - NO PAIN
PAINLEVEL_OUTOF10: 0 - NO PAIN

## 2024-06-10 NOTE — CARE PLAN
The patient's goals for the shift include receive IV antibx    The clinical goals for the shift include no pain      Problem: Pain - Adult  Goal: Verbalizes/displays adequate comfort level or baseline comfort level  Outcome: Met     Problem: Skin  Goal: Prevent/manage excess moisture  Outcome: Met     Problem: Fall/Injury  Goal: Not fall by end of shift  Outcome: Met

## 2024-06-10 NOTE — CARE PLAN
The patient's goals for the shift include receive IV antibx    The clinical goals for the shift include no pain      Problem: Discharge Planning  Goal: Discharge to home or other facility with appropriate resources  Outcome: Met     Problem: Chronic Conditions and Co-morbidities  Goal: Patient's chronic conditions and co-morbidity symptoms are monitored and maintained or improved  Outcome: Progressing     Problem: Skin  Goal: Participates in plan/prevention/treatment measures  Outcome: Progressing  Goal: Prevent/minimize sheer/friction injuries  Outcome: Progressing  Goal: Promote/optimize nutrition  Outcome: Progressing  Goal: Promote skin healing  Outcome: Progressing     Problem: Fall/Injury  Goal: Be free from injury by end of the shift  Outcome: Progressing  Goal: Verbalize understanding of personal risk factors for fall in the hospital  Outcome: Progressing  Goal: Verbalize understanding of risk factor reduction measures to prevent injury from fall in the home  Outcome: Progressing  Goal: Use assistive devices by end of the shift  Outcome: Progressing  Goal: Pace activities to prevent fatigue by end of the shift  Outcome: Progressing

## 2024-06-10 NOTE — PROGRESS NOTES
06/10/24 0918   Discharge Planning   Patient expects to be discharged to: Home with parents and to resume [facility]; reached out to medical team and advised orders are need for IV antibiotics once determined in order to arrange them for him to go home.   Does the patient need discharge transport arranged? No     3:22 PM  Patient is being discharged to home. Per ID patient being discharged on oral antibiotics. Nurse updated to the same.     Reached out to [facility] and updated them to discharge today.

## 2024-06-10 NOTE — DISCHARGE SUMMARY
"Discharge Diagnosis  E coli bacteremia    Discharge Meds     Your medication list        START taking these medications        Instructions Last Dose Given Next Dose Due   cefuroxime 500 mg tablet  Commonly known as: Ceftin      Take 1 tablet (500 mg) by mouth 2 times a day.              CONTINUE taking these medications        Instructions Last Dose Given Next Dose Due   methocarbamol 500 mg tablet  Commonly known as: Robaxin      Take 1 tablet (500 mg) by mouth 3 times a day as needed for muscle spasms for up to 14 days.       Trulicity 0.75 mg/0.5 mL pen injector  Generic drug: dulaglutide                  STOP taking these medications      docusate sodium 100 mg capsule  Commonly known as: Colace        enoxaparin 40 mg/0.4 mL syringe  Commonly known as: Lovenox        polyethylene glycol 17 gram packet  Commonly known as: Glycolax, Miralax                  Where to Get Your Medications        These medications were sent to [facility]  [address], [address], [facility] [place] [address]      Hours: 9 AM to 6 PM Mon-Fri, 9 AM to 1 PM Sat Phone: [phone]   cefuroxime 500 mg tablet         Test Results Pending At Discharge  Pending Labs       Order Current Status    Troponin T Series, High Sensitivity (0, 2HR, 6HR) In process    Blood Culture Preliminary result    Blood Culture Preliminary result            Hospital Course   Erwin Cline is a 24 y.o. 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 Pt was discharged to SNF after surgery and apparently was having issues with incontinence therefore castaneda was placed. Parents state they feel out of laziness that pt was \"peeing everywhere\". Due to mental health issues the patient has been pulling on his castaneda, resulting in hematuria. Upon  ED evaluation the patient is noted to have a UTI and is triggering SIRS. The parents are at their wits end and " state the patients mental health issues are affecting his recovery. They are requesting psych evaluation. Denies fever or chills.  He states has felt quite well however fails to engage in conversation. He is refusing to allow me to examine him or see his back incision. Apparently the patient did have a wound VAC for a short time post-operatively however pulled that out. CT abd/pelvis was done in ED and shows blood material in the bladder.  Blood cultures revealed E. Coli. Infectious disease was consulted. The patient was treated with IV ceftriaxone.  He was discharged in a stable condition on oral cefuroxime for a total of 14 days    Pertinent Physical Exam At Time of Discharge  Physical Exam  Constitutional:       Appearance: He is obese.      Comments: Morbidly obese   HENT:      Head: Normocephalic and atraumatic.      Nose: Nose normal.      Mouth/Throat:      Mouth: Mucous membranes are moist.      Pharynx: Oropharynx is clear.   Eyes:      Extraocular Movements: Extraocular movements intact.      Pupils: Pupils are equal, round, and reactive to light.   Cardiovascular:      Rate and Rhythm: Regular rhythm. Tachycardia present.      Pulses: Normal pulses.   Pulmonary:      Effort: Pulmonary effort is normal.      Breath sounds: Normal breath sounds.   Abdominal:      General: Abdomen is flat. Bowel sounds are normal.      Palpations: Abdomen is soft.   Genitourinary:     Comments: Gross hematuria  Musculoskeletal:         General: Normal range of motion.   Skin:     General: Skin is warm and dry.      Capillary Refill: Capillary refill takes less than 2 seconds.   Neurological:      General: No focal deficit present.      Mental Status: He is oriented to person, place, and time.   Psychiatric:         Mood and Affect: Mood normal.     Outpatient Follow-Up  Future Appointments   Date Time Provider Department Center   7/11/2024  1:00 PM Marina Grier MD [id number] [facility]         Jaqueline Aly MD

## 2024-06-10 NOTE — PROGRESS NOTES
Erwin Cline is a 24 y.o. male on day 6 of admission presenting with E coli bacteremia.    Subjective   Interval History:   Afebrile, no chills  No pain  No shortness of breath cough or chest pain  No nausea, vomiting or diarrhea       Objective   Range of Vitals (last 24 hours)  Heart Rate:  [73-78]   Temp:  [36.1 °C (97 °F)-37 °C (98.6 °F)]   Resp:  [16-20]   BP: (110-144)/(49-85)   SpO2:  [95 %-98 %]   Daily Weight  06/06/24 : (!) 204 kg (449 lb)    Body mass index is 56.12 kg/m².    Physical Exam  Constitutional:       Appearance: Normal appearance.   HENT:      Head: Normocephalic and atraumatic.      Nose: Nose normal.      Mouth/Throat:      Mouth: Mucous membranes are moist.      Pharynx: Oropharynx is clear.   Eyes:      Conjunctiva/sclera: Conjunctivae normal.   Cardiovascular:      Rate and Rhythm: Regular rhythm present.   Pulmonary:      Effort: Pulmonary effort is normal.      Breath sounds: Normal breath sounds.   Abdominal:      General: Bowel sounds are normal.      Palpations: Abdomen is soft.   Genitourinary: castaneda catheter   Musculoskeletal:         General: Normal range of motion.      Cervical back: Normal range of motion and neck supple.   Skin:     General: Skin is warm and dry.      Comments: Healed surgical incision    Neurological:      Mental Status: He is alert.      Comments: Awake, alert   Psychiatric:         Mood and Affect: Mood normal.         Behavior: Behavior normal.     Antibiotics  cefTRIAXone (Rocephin) IVPB 1 g  sodium chloride 0.9 % bolus 1,000 mL  acetaminophen (Tylenol) tablet 975 mg  ketorolac (Toradol) injection 15 mg  ondansetron (Zofran) injection 4 mg  iohexol (OMNIPaque) 350 mg iodine/mL solution 75 mL  sodium chloride 0.9 % bolus 1,000 mL  sodium chloride 0.9 % bolus 1,000 mL  vancomycin 1.5 g in 300 mL (Xellia) IVPB 1.5 g  tobramycin (Nebcin) 700 mg in dextrose 5% 100 mL IV  cefTRIAXone (Rocephin) IVPB 1 g  lactated Ringer's bolus 1,000 mL  docusate  "sodium (Colace) capsule 100 mg  enoxaparin (Lovenox) syringe 40 mg  methocarbamol (Robaxin) tablet 500 mg  polyethylene glycol (Glycolax, Miralax) packet 17 g  enoxaparin (Lovenox) syringe 40 mg  polyethylene glycol (Glycolax, Miralax) packet 17 g  cefTRIAXone (Rocephin) 2 g in dextrose 5% in water (D5W) 50 mL  acetaminophen (Tylenol) tablet 975 mg  meropenem (Merrem) 1 g IV in sodium chloride 0.9% 50 mL  vancomycin (Vancocin) pharmacy to dose - pharmacy monitoring  sodium chloride 0.9 % bolus 1,000 mL  vancomycin-diluent combo no.1 (Xellia) IVPB 2 g  sodium chloride 0.9% infusion  ibuprofen tablet 800 mg  norepinephrine (Levophed) 8 mg in dextrose 5% 250 mL (0.032 mg/mL) infusion (premix)  ketorolac (Toradol) injection 15 mg  lidocaine (Xylocaine) 10 mg/mL (1 %) injection 50 mg  alteplase (Cathflo Activase) injection 2 mg  meropenem (Merrem) in sodium chloride 0.9 % 100 mL IV 2 g  dextrose 5 % and lactated Ringer's with KCl 20 mEq/L infusion  acetaminophen (Tylenol) tablet 650 mg  perflutren lipid microspheres (Definity) injection 0.5-10 mL of dilution  sulfur hexafluoride microsphr (Lumason) injection 24.28 mg  perflutren protein A microsphere (Optison) injection 0.5 mL  traZODone (Desyrel) tablet 50 mg  cefTRIAXone (Rocephin) 2 g in dextrose 5% in water (D5W) 50 mL      Relevant Results  Labs  Results from last 72 hours   Lab Units 06/09/24  0439   WBC AUTO x10*3/uL 6.5   HEMOGLOBIN g/dL 11.5*   HEMATOCRIT % 35.9*   PLATELETS AUTO x10*3/uL 182     Results from last 72 hours   Lab Units 06/09/24  0439   SODIUM mmol/L 141   POTASSIUM mmol/L 4.1   CHLORIDE mmol/L 105   CO2 mmol/L 24   BUN mg/dL 9   CREATININE mg/dL 0.50   GLUCOSE mg/dL 86   CALCIUM mg/dL 8.9   ANION GAP mmol/L 12   EGFR mL/min/1.73m*2 >90           Estimated Creatinine Clearance: 125 mL/min (by C-G formula based on SCr of 0.5 mg/dL).  No results found for: \"CRP\"  Microbiology  Susceptibility data from last 14 days.  Collected Specimen Info Organism " Amikacin Amoxicillin/Clavulanate Ampicillin Ampicillin/Sulbactam Cefazolin Ceftriaxone Cefuroxime Ciprofloxacin Gentamicin Levofloxacin Piperacillin/Tazobactam Tobramycin   06/04/24 Blood culture from Peripheral Venipuncture Escherichia coli               06/04/24 Blood culture from Peripheral Venipuncture Escherichia coli  S  S  R  R  I  S  S  R  R  R  S  R     Collected Specimen Info Organism Trimethoprim/Sulfamethoxazole   06/04/24 Blood culture from Peripheral Venipuncture Escherichia coli    06/04/24 Blood culture from Peripheral Venipuncture Escherichia coli  S     Imaging  CT lumbar spine wo IV contrast    Result Date: 6/8/2024  Interpreted By:  Derrick Casas, STUDY: CT LUMBAR SPINE WO IV CONTRAST;  6/7/2024 9:01 pm   INDICATION: Signs/Symptoms:rule out lumbar spine infection, unable to perform MRI.   COMPARISON: CT lumbar spine 04/30/2024   ACCESSION NUMBER(S): [id number]   ORDERING CLINICIAN: HERIBERTO ARROYO   TECHNIQUE: Axial CT images of the lumbar spine are obtained. Axial, coronal and sagittal reconstructions are submitted for review.   FINDINGS: Postoperative changes L4-L5 laminectomy.   Trauma: No acute fracture.   Alignment: Straightening of the lumbar lordosis.   Vertebral Body Heights: The lumbar vertebral body heights are intact.   Intervertebral Discs:  The disc spaces are grossly preserved.   Degenerative change: Postoperative changes L4-L5 laminectomy, since 04/30/2024. There is stable canal stenosis bilateral neural foraminal narrowing. Redemonstration sequela of congenital lumbar stenosis from shortened pedicles.   Paraspinous Soft Tissues: Mild subcutaneous edema, likely secondary to some postoperative changes..       Postoperative changes L4-L5 laminectomy, since 04/30/2024. Diffuse subcutaneous edema most prominent at L4-L5. No drainable fluid collection. Within limits of evaluation, no evidence of discitis/osteomyelitis.   Signed by: Derrick Casas 6/8/2024 4:10 PM Dictation  workstation:   [id number]    Transthoracic Echo (TTE) Complete    Result Date: 6/5/2024            [facility] [address], [place] [address]             Phone [phone] TRANSTHORACIC ECHOCARDIOGRAM REPORT  Patient Name:      ABDIEL JACOB      Reading Physician:    [id number] Earl Romano  Study Date:        6/5/2024             Ordering Provider:    [id number] LISSETTE HUMPHREY MRN/PID:           [id number]             Fellow: Accession#:        [id number]         Nurse: Date of Birth/Age: 2000 / 24 years Sonographer:          Izabel Olmos RDCS Gender:            M                    Additional Staff: Height:            190.50 cm            Admit Date: Weight:            198.68 kg            Admission Status:     Inpatient -                                                               Routine BSA / BMI:         3.06 m2 / 54.75      Department Location:  [facility]                    kg/m2 Blood Pressure: 118 /81 mmHg Study Type:    TRANSTHORACIC ECHO (TTE) COMPLETE Diagnosis/ICD: Sepsis, unspecified organism-A41.9 Indication:    Severe sepsis CPT Codes:     Echo Complete w Full Doppler-[id number] Patient History: Pertinent History: No cardiac Hx. Study Detail: The following Echo studies were performed: 2D, M-Mode, Doppler and               color flow. Image quality for this study is fair. Technically               challenging study due to poor acoustic windows and body habitus.               Definity used as a contrast agent for endocardial border               definition. Total contrast used for this procedure was 2 mL via IV               push.  PHYSICIAN INTERPRETATION: Left Ventricle: Left ventricular systolic function is hyperdynamic, with an estimated ejection fraction of 70-75%. There are no  regional wall motion abnormalities. The left ventricular cavity size is normal. Spectral Doppler shows a normal pattern of left ventricular diastolic filling. Left Atrium: The left atrium is normal in size. Right Ventricle: The right ventricle is normal in size. There is normal right ventricular global systolic function. Right Atrium: The right atrium is normal in size. Aortic Valve: The aortic valve appears structurally normal. There is no evidence of aortic valve regurgitation. The peak instantaneous gradient of the aortic valve is 9.4 mmHg. The mean gradient of the aortic valve is 5.0 mmHg. Mitral Valve: The mitral valve is normal in structure. There is no evidence of mitral valve regurgitation. Tricuspid Valve: The tricuspid valve is structurally normal. No evidence of tricuspid regurgitation. The right ventricular systolic pressure is unable to be estimated. Pulmonic Valve: The pulmonic valve is structurally normal. There is no indication of pulmonic valve regurgitation. Pericardium: There is no pericardial effusion noted. Aorta: The aortic root is normal.  CONCLUSIONS:  1. Left ventricular systolic function is hyperdynamic with a 70-75% estimated ejection fraction. QUANTITATIVE DATA SUMMARY: M-MODE MEASUREMENTS:                  Normal Ranges: Ao Root: 2.40 cm (2.0-3.7cm) LAs:     3.60 cm (2.7-4.0cm) AORTIC VALVE:                                   Normal Ranges: AoV Vmax:                1.53 m/s (<=1.7m/s) AoV Peak P.4 mmHg (<20mmHg) AoV Mean P.0 mmHg (1.7-11.5mmHg) LVOT Max Mike:            1.23 m/s (<=1.1m/s) AoV VTI:                 23.60 cm (18-25cm) LVOT VTI:                19.70 cm LVOT Diameter:           2.20 cm  (1.8-2.4cm) AoV Area, VTI:           3.17 cm2 (2.5-5.5cm2) AoV Area,Vmax:           3.06 cm2 (2.5-4.5cm2) AoV Dimensionless Index: 0.83 PULMONIC VALVE:                         Normal Ranges: PV Accel Time: 98 msec  (>120ms) PV Max Mike:    1.4 m/s  (0.6-0.9m/s) PV  Max P.5 mmHg  [id number] Earl Romano DO Electronically signed on 2024 at 5:31:10 PM  ** Final **     ECG 12 Lead    Result Date: 2024  Sinus tachycardia Otherwise normal ECG When compared with ECG of 01-MAY-2024 03:01, No significant change was found Confirmed by Earl Romano ([id number]) on 2024 10:59:53 AM    XR chest 1 view    Result Date: 2024  Interpreted By:  Isaias Wing, STUDY: XR CHEST 1 VIEW;  2024 3:15 am   INDICATION: Signs/Symptoms:sob.   COMPARISON: 2024   ACCESSION NUMBER(S): [id number]   ORDERING CLINICIAN: MANJINDER ODONNELL   FINDINGS:     The cardiomediastinal silhouette and pulmonary vasculature are within normal limits. No consolidation, pleural effusion or pneumothorax.         No acute cardiopulmonary process.     MACRO: None.   Signed by: Isaias Wing 2024 3:35 AM Dictation workstation:   [id number]    CT abdomen pelvis w IV contrast    Result Date: 2024  Interpreted By:  Julianna Tolliver, STUDY: CT ABDOMEN PELVIS W IV CONTRAST;  2024 3:03 pm   INDICATION: Signs/Symptoms:abdominal pain, hematuria, bandemia.   COMPARISON: None.   ACCESSION NUMBER(S): [id number]   ORDERING CLINICIAN: VIDHYA WYATT   TECHNIQUE: CT of the abdomen and pelvis was performed.  75 mL Omnipaque 350   FINDINGS: LOWER CHEST: Images of the lung bases show no infiltrate or pleural fluid. There is flame shaped tissue in the retroareolar region of the left breast, likely unilateral gynecomastia.   ABDOMEN:   LIVER: There is no hepatic mass. There is fatty infiltration of the liver.   BILE DUCTS: There is no intrahepatic, common hepatic or common bile ductal dilatation.   GALLBLADDER: The gallbladder is unremarkable.   PANCREAS: The pancreas is unremarkable.   SPLEEN: The spleen is unremarkable. There is no splenic mass or splenomegaly.   ADRENAL GLANDS: The adrenal glands are unremarkable.   KIDNEYS AND URETERS: The kidneys function symmetrically. The kidneys demonstrate no  mass. There is no intrarenal calculus or hydronephrosis. There is a Castaneda catheter insufflated in the bladder. There is hyperdense fluid in the bladder. This is consistent with blood. This could be secondary to hemorrhagic cystitis on or iatrogenic from Castaneda catheter placement.   BOWEL: There is no bowel wall thickening, dilatation or obstruction. The appendix is normal.   VESSELS: The abdominal and pelvic vessels are unremarkable.   PERITONEUM/RETROPERITONEUM/LYMPH NODES: There is no retroperitoneal or pelvic adenopathy.  There is no ascites.   ABDOMINAL WALL: The abdominal wall is unremarkable.   BONE AND SOFT TISSUE: There is no acute osseous finding. Status post partial resection spinous process L4   There is no soft tissue abnormality.       1. Castaneda catheter insufflated in the bladder. There is hyperdense material in the bladder lumen. This is either iatrogenic from catheter placement or hemorrhagic cystitis. 2. Fatty infiltration of the liver.   MACRO: None   Signed by: Julianna Tolliver 6/4/2024 3:32 PM Dictation workstation:   [id number]     Assessment/Plan   Sepsis secondary to gram negative bacteremia, resolved  Acute kidney injury, resolved  Fever, secondary to below, resolved  E. Coli bacteremia likely secondary to UTI-susceptible to cefuroxime, ceftriaxone   Hematuria, likely secondary to UTI, castaneda catheter   Pyuria versus urinary tract infection-culture no growth   Status post Recent spine surgery  Leukocytosis, resolved     IV ceftriaxone  Monitor renal function  Follow up repeat blood culture  Monitor temperature and WBC  Long-term plan is 14 days of antibiotic therapy, can de-escalate to po cefuroxime-till 6/18/2024    Total time spent caring for the patient today was 20 minutes. This includes time spent before the visit reviewing the chart, time spent during the visit, and time spent after the visit on documentation   Catalina Tracey, APRN-CNP

## 2024-06-11 VITALS
BODY MASS INDEX: 39.17 KG/M2 | HEART RATE: 66 BPM | WEIGHT: 315 LBS | DIASTOLIC BLOOD PRESSURE: 76 MMHG | OXYGEN SATURATION: 98 % | RESPIRATION RATE: 18 BRPM | SYSTOLIC BLOOD PRESSURE: 107 MMHG | TEMPERATURE: 98.4 F | HEIGHT: 75 IN

## 2024-06-11 PROCEDURE — 2500000004 HC RX 250 GENERAL PHARMACY W/ HCPCS (ALT 636 FOR OP/ED): Performed by: INTERNAL MEDICINE

## 2024-06-11 PROCEDURE — 97530 THERAPEUTIC ACTIVITIES: CPT | Mod: GP

## 2024-06-11 RX ADMIN — ENOXAPARIN SODIUM 40 MG: 40 INJECTION SUBCUTANEOUS at 06:02

## 2024-06-11 ASSESSMENT — COGNITIVE AND FUNCTIONAL STATUS - GENERAL
MOVING TO AND FROM BED TO CHAIR: A LITTLE
TURNING FROM BACK TO SIDE WHILE IN FLAT BAD: A LITTLE
CLIMB 3 TO 5 STEPS WITH RAILING: A LITTLE
MOVING FROM LYING ON BACK TO SITTING ON SIDE OF FLAT BED WITH BEDRAILS: A LITTLE
WALKING IN HOSPITAL ROOM: A LITTLE
WALKING IN HOSPITAL ROOM: A LITTLE
DAILY ACTIVITIY SCORE: 18
MOVING FROM LYING ON BACK TO SITTING ON SIDE OF FLAT BED WITH BEDRAILS: A LITTLE
MOVING TO AND FROM BED TO CHAIR: A LITTLE
PERSONAL GROOMING: A LITTLE
STANDING UP FROM CHAIR USING ARMS: A LITTLE
TOILETING: A LITTLE
STANDING UP FROM CHAIR USING ARMS: A LITTLE
CLIMB 3 TO 5 STEPS WITH RAILING: A LITTLE
DRESSING REGULAR LOWER BODY CLOTHING: A LITTLE
MOBILITY SCORE: 18
DRESSING REGULAR UPPER BODY CLOTHING: A LITTLE
MOBILITY SCORE: 18
EATING MEALS: A LITTLE
TURNING FROM BACK TO SIDE WHILE IN FLAT BAD: A LITTLE
HELP NEEDED FOR BATHING: A LITTLE

## 2024-06-11 ASSESSMENT — PAIN - FUNCTIONAL ASSESSMENT: PAIN_FUNCTIONAL_ASSESSMENT: 0-10

## 2024-06-11 ASSESSMENT — PAIN SCALES - GENERAL
PAINLEVEL_OUTOF10: 0 - NO PAIN
PAINLEVEL_OUTOF10: 0 - NO PAIN

## 2024-06-11 NOTE — PROGRESS NOTES
06/11/24 0928   Discharge Planning   Patient expects to be discharged to: Home with [facility]; they are aware of discharge.   Does the patient need discharge transport arranged? No

## 2024-06-11 NOTE — PROGRESS NOTES
Physical Therapy    Physical Therapy Treatment    Patient Name: Erwin Cline  MRN: [id number]  Today's Date: 6/11/2024  Time Calculation  Start Time: 1345  Stop Time: 1400  Time Calculation (min): 15 min    Assessment/Plan   PT Assessment  PT Assessment Results: Decreased strength, Decreased range of motion, Decreased endurance, Impaired balance, Decreased safety awareness, Impaired sensation, Impaired tone, Obesity, Decreased mobility  Rehab Prognosis: Good  Barriers to Discharge: limited mobility  Evaluation/Treatment Tolerance: Patient tolerated treatment well  Medical Staff Made Aware: Yes  Strengths: Support of Caregivers  Barriers to Participation: Comorbidities  End of Session Communication: Bedside nurse  Assessment Comment: Pt moving w/ CGA, urine incontinence limits mobility  End of Session Patient Position: Up in chair (recliner)  PT Plan  Inpatient/Swing Bed or Outpatient: Inpatient  PT Plan  Treatment/Interventions: Bed mobility, Transfer training, Gait training, Strengthening, Endurance training, Therapeutic exercise, Therapeutic activity, Home exercise program  PT Plan: Skilled PT  PT Frequency: 4 times per week  PT Discharge Recommendations: Low intensity level of continued care  Equipment Recommended upon Discharge: Wheeled walker  PT Recommended Transfer Status: Assist x1, Assistive device  PT - OK to Discharge: Yes      General Visit Information:   PT  Visit  PT Received On: 06/11/24  General  Reason for Referral: impaired mobility, UTI, SIRS  Referred By: Dr. Geller  Past Medical History Relevant to Rehab: ADHD, asperger's, OCD, recent lumbar lami d/t cauda equina syndrome  Family/Caregiver Present: No  Prior to Session Communication: Bedside nurse  Patient Position Received: Bed, 4 rail up, Alarm on  Preferred Learning Style: verbal  General Comment: Pt agreeable  to PT session, states that he hopes to go home today. During session, physician came in the room  to inform pt of his  discharge clearance.    Subjective   Precautions:  Precautions  Medical Precautions: Fall precautions  Post-Surgical Precautions: Spinal precautions  Precautions Comment: recent spinal sx, per patient at beginning of May    Objective   Pain:  Pain Assessment  Pain Assessment: 0-10  Pain Score: 0 - No pain  Postural Control:  Dynamic Standing Balance  Dynamic Standing-Balance Support: Bilateral upper extremity supported  Dynamic Standing-Balance:  (steps bed to recliner, turn)  Dynamic Standing-Comments: Fair    Activity Tolerance:  Activity Tolerance  Activity Tolerance Comments: Fair  Treatments:  Therapeutic Activity  Therapeutic Activity Performed: Yes  Therapeutic Activity 1: Assisted w/ donning of pull up type brief while sitting EOB, assist to put LEs into leg holes. Assist to pull up brief, pt incontinent of urine during transfer to stand; partially contained by brief, however, not quite big enough w/ paper side ripping. Assist for removal of brief, changing of socks. Pt able to get pant legs over feet, assist for placing brief, however, pt incontinent of urine again upon standing, getting pt's sweatpants slightly wet; pt declined to change into hospital pants. Disposable pad given to pt for car seat if needed.    Bed Mobility 1  Bed Mobility 1: Supine to sitting  Level of Assistance 1: Close supervision  Bed Mobility Comments 1: to Lt EOB w/ HOB elevated part way    Ambulation/Gait Training 1  Surface 1: Level tile  Device 1: Rolling walker (HD)  Assistance 1: Contact guard  Quality of Gait 1: Decreased step length, Shuffling gait, Forward flexed posture  Comments/Distance (ft) 1: Pt amb~ 4' to recliner w/ HDRW  Transfer 1  Technique 1: Sit to stand, Stand to sit  Transfer Device 1: Walker  Transfer Level of Assistance 1: Contact guard  Trials/Comments 1: from EOB, to/from bedside recliner    Outcome Measures:  [facility] Basic Mobility  Turning from your back to your side while in a flat bed without using  bedrails: A little  Moving from lying on your back to sitting on the side of a flat bed without using bedrails: A little  Moving to and from bed to chair (including a wheelchair): A little  Standing up from a chair using your arms (e.g. wheelchair or bedside chair): A little  To walk in hospital room: A little  Climbing 3-5 steps with railing: A little  Basic Mobility - Total Score: 18    Education Documentation  No documentation found.  Education Comments  No comments found.        OP EDUCATION:       Encounter Problems       Encounter Problems (Resolved)       PT goals       Patient will transfer supine to sit and sit to supine with supervision assist to facilitate mobility.  (Adequate for Discharge)       Start:  06/05/24    Expected End:  06/19/24    Resolved:  06/11/24         Patient will transfer bed to chair and chair to bed with supervision assist to facilitate mobility.  (Adequate for Discharge)       Start:  06/05/24    Expected End:  06/19/24    Resolved:  06/11/24         Patient will amb 25 feet with rolling walker device including no turns on even surface with supervision assist to facilitate safe mobility.  (Adequate for Discharge)       Start:  06/05/24    Expected End:  06/19/24    Resolved:  06/11/24         Patient will increase B LE strength to 3/5 to improve functional mobility.    (Adequate for Discharge)       Start:  06/05/24    Expected End:  06/19/24    Resolved:  06/11/24            Pain - Adult

## 2024-06-11 NOTE — PROGRESS NOTES
"Erwin Cline is a 24 y.o. male on day 6 of admission presenting with E coli bacteremia.    Subjective   Godoy draining clear.   The patient and his mother would like a voiding trial,       Objective     Physical Exam  Alert, oriented  Abdomen soft  Godoy draining clear    Last Recorded Vitals  Blood pressure 134/58, pulse 72, temperature 36.5 °C (97.7 °F), temperature source Oral, resp. rate 18, height 1.905 m (6' 3\"), weight (!) 204 kg (449 lb), SpO2 96%.  Intake/Output last 3 Shifts:  I/O last 3 completed shifts:  In: 480 (2.4 mL/kg) [P.O.:480]  Out: 4400 (21.6 mL/kg) [Urine:4400 (0.6 mL/kg/hr)]  Weight: 203.7 kg     Relevant Results  Scheduled medications  acetaminophen, 975 mg, oral, Once  cefTRIAXone, 2 g, intravenous, q24h  docusate sodium, 100 mg, oral, BID  enoxaparin, 40 mg, subcutaneous, Daily  lidocaine, 5 mL, infiltration, Once      Continuous medications     PRN medications  PRN medications: acetaminophen, methocarbamol, polyethylene glycol, traZODone    No results found for this or any previous visit (from the past 24 hour(s)).    CT lumbar spine wo IV contrast    Result Date: 6/8/2024  Interpreted By:  Derrick Casas, STUDY: CT LUMBAR SPINE WO IV CONTRAST;  6/7/2024 9:01 pm   INDICATION: Signs/Symptoms:rule out lumbar spine infection, unable to perform MRI.   COMPARISON: CT lumbar spine 04/30/2024   ACCESSION NUMBER(S): [id number]   ORDERING CLINICIAN: HERIBERTO ARROYO   TECHNIQUE: Axial CT images of the lumbar spine are obtained. Axial, coronal and sagittal reconstructions are submitted for review.   FINDINGS: Postoperative changes L4-L5 laminectomy.   Trauma: No acute fracture.   Alignment: Straightening of the lumbar lordosis.   Vertebral Body Heights: The lumbar vertebral body heights are intact.   Intervertebral Discs:  The disc spaces are grossly preserved.   Degenerative change: Postoperative changes L4-L5 laminectomy, since 04/30/2024. There is stable canal stenosis bilateral " neural foraminal narrowing. Redemonstration sequela of congenital lumbar stenosis from shortened pedicles.   Paraspinous Soft Tissues: Mild subcutaneous edema, likely secondary to some postoperative changes..       Postoperative changes L4-L5 laminectomy, since 04/30/2024. Diffuse subcutaneous edema most prominent at L4-L5. No drainable fluid collection. Within limits of evaluation, no evidence of discitis/osteomyelitis.   Signed by: Derrick Casas 6/8/2024 4:10 PM Dictation workstation:   [id number]    Transthoracic Echo (TTE) Complete    Result Date: 6/5/2024            [facility] [address], [place] [id number]             Phone [phone] TRANSTHORACIC ECHOCARDIOGRAM REPORT  Patient Name:      ABDIEL JACOB      Reading Physician:    [id number] Earl Romano DO Study Date:        6/5/2024             Ordering Provider:    [id number] LISSETTE HUMPHREY MRN/PID:           [id number]             Fellow: Accession#:        [id number]         Nurse: Date of Birth/Age: 2000 / 24 years Sonographer:          Izabel Olmos RDCS Gender:            M                    Additional Staff: Height:            190.50 cm            Admit Date: Weight:            198.68 kg            Admission Status:     Inpatient -                                                               Routine BSA / BMI:         3.06 m2 / 54.75      Department Location:  [facility]                    kg/m2 Blood Pressure: 118 /81 mmHg Study Type:    TRANSTHORACIC ECHO (TTE) COMPLETE Diagnosis/ICD: Sepsis, unspecified organism-A41.9 Indication:    Severe sepsis CPT Codes:     Echo Complete w Full [id number] Patient History: Pertinent History: No cardiac Hx. Study Detail: The following Echo studies were performed: 2D, M-Mode, Doppler and                color flow. Image quality for this study is fair. Technically               challenging study due to poor acoustic windows and body habitus.               Definity used as a contrast agent for endocardial border               definition. Total contrast used for this procedure was 2 mL via IV               push.  PHYSICIAN INTERPRETATION: Left Ventricle: Left ventricular systolic function is hyperdynamic, with an estimated ejection fraction of 70-75%. There are no regional wall motion abnormalities. The left ventricular cavity size is normal. Spectral Doppler shows a normal pattern of left ventricular diastolic filling. Left Atrium: The left atrium is normal in size. Right Ventricle: The right ventricle is normal in size. There is normal right ventricular global systolic function. Right Atrium: The right atrium is normal in size. Aortic Valve: The aortic valve appears structurally normal. There is no evidence of aortic valve regurgitation. The peak instantaneous gradient of the aortic valve is 9.4 mmHg. The mean gradient of the aortic valve is 5.0 mmHg. Mitral Valve: The mitral valve is normal in structure. There is no evidence of mitral valve regurgitation. Tricuspid Valve: The tricuspid valve is structurally normal. No evidence of tricuspid regurgitation. The right ventricular systolic pressure is unable to be estimated. Pulmonic Valve: The pulmonic valve is structurally normal. There is no indication of pulmonic valve regurgitation. Pericardium: There is no pericardial effusion noted. Aorta: The aortic root is normal.  CONCLUSIONS:  1. Left ventricular systolic function is hyperdynamic with a 70-75% estimated ejection fraction. QUANTITATIVE DATA SUMMARY: M-MODE MEASUREMENTS:                  Normal Ranges: Ao Root: 2.40 cm (2.0-3.7cm) LAs:     3.60 cm (2.7-4.0cm) AORTIC VALVE:                                   Normal Ranges: AoV Vmax:                1.53 m/s (<=1.7m/s) AoV Peak P.4 mmHg  (<20mmHg) AoV Mean P.0 mmHg (1.7-11.5mmHg) LVOT Max Mike:            1.23 m/s (<=1.1m/s) AoV VTI:                 23.60 cm (18-25cm) LVOT VTI:                19.70 cm LVOT Diameter:           2.20 cm  (1.8-2.4cm) AoV Area, VTI:           3.17 cm2 (2.5-5.5cm2) AoV Area,Vmax:           3.06 cm2 (2.5-4.5cm2) AoV Dimensionless Index: 0.83 PULMONIC VALVE:                         Normal Ranges: PV Accel Time: 98 msec  (>120ms) PV Max Mike:    1.4 m/s  (0.6-0.9m/s) PV Max P.5 mmHg  [id number] Earl Romano DO Electronically signed on 2024 at 5:31:10 PM  ** Final **     ECG 12 Lead    Result Date: 2024  Sinus tachycardia Otherwise normal ECG When compared with ECG of 01-MAY-2024 03:01, No significant change was found Confirmed by Earl Romano ([id number]) on 2024 10:59:53 AM    XR chest 1 view    Result Date: 2024  Interpreted By:  Isaias Wing, STUDY: XR CHEST 1 VIEW;  2024 3:15 am   INDICATION: Signs/Symptoms:sob.   COMPARISON: 2024   ACCESSION NUMBER(S): [id number]   ORDERING CLINICIAN: MANJINDER ODONNELL   FINDINGS:     The cardiomediastinal silhouette and pulmonary vasculature are within normal limits. No consolidation, pleural effusion or pneumothorax.         No acute cardiopulmonary process.     MACRO: None.   Signed by: Isaias Wing 2024 3:35 AM Dictation workstation:   [id number]    CT abdomen pelvis w IV contrast    Result Date: 2024  Interpreted By:  Julianna Tolliver, STUDY: CT ABDOMEN PELVIS W IV CONTRAST;  2024 3:03 pm   INDICATION: Signs/Symptoms:abdominal pain, hematuria, bandemia.   COMPARISON: None.   ACCESSION NUMBER(S): [id number]   ORDERING CLINICIAN: VIDHYA WYATT   TECHNIQUE: CT of the abdomen and pelvis was performed.  75 mL Omnipaque 350   FINDINGS: LOWER CHEST: Images of the lung bases show no infiltrate or pleural fluid. There is flame shaped tissue in the retroareolar region of the left breast, likely unilateral gynecomastia.    ABDOMEN:   LIVER: There is no hepatic mass. There is fatty infiltration of the liver.   BILE DUCTS: There is no intrahepatic, common hepatic or common bile ductal dilatation.   GALLBLADDER: The gallbladder is unremarkable.   PANCREAS: The pancreas is unremarkable.   SPLEEN: The spleen is unremarkable. There is no splenic mass or splenomegaly.   ADRENAL GLANDS: The adrenal glands are unremarkable.   KIDNEYS AND URETERS: The kidneys function symmetrically. The kidneys demonstrate no mass. There is no intrarenal calculus or hydronephrosis. There is a Godoy catheter insufflated in the bladder. There is hyperdense fluid in the bladder. This is consistent with blood. This could be secondary to hemorrhagic cystitis on or iatrogenic from Godoy catheter placement.   BOWEL: There is no bowel wall thickening, dilatation or obstruction. The appendix is normal.   VESSELS: The abdominal and pelvic vessels are unremarkable.   PERITONEUM/RETROPERITONEUM/LYMPH NODES: There is no retroperitoneal or pelvic adenopathy.  There is no ascites.   ABDOMINAL WALL: The abdominal wall is unremarkable.   BONE AND SOFT TISSUE: There is no acute osseous finding. Status post partial resection spinous process L4   There is no soft tissue abnormality.       1. Godoy catheter insufflated in the bladder. There is hyperdense material in the bladder lumen. This is either iatrogenic from catheter placement or hemorrhagic cystitis. 2. Fatty infiltration of the liver.   MACRO: None   Signed by: Julianna Tolliver 6/4/2024 3:32 PM Dictation workstation:   [id number]                     Assessment/Plan   Principal Problem:    E coli bacteremia  Active Problems:    Morbid obesity (Multi)    Septic shock (Multi)    Gross hematuria    Gram negative sepsis (Multi)    Urinary retention  Voiding trial ordered for tomorrow AM.  Check post void residual.  If he fails the trial, discharge with catheter and follow up in the office.           Christiano Ann MD

## 2024-06-18 NOTE — DOCUMENTATION CLARIFICATION NOTE
"    PATIENT:               ABDIEL WALKER  ACCT #:                  [id number]  MRN:                       [id number]  :                       2000  ADMIT DATE:       2024 12:55 PM  DISCH DATE:        2024 2:53 PM  RESPONDING PROVIDER #:        [id number]          PROVIDER RESPONSE TEXT:    UTI related to Indwelling Castaneda Catheter POA    CDI QUERY TEXT:    Clarification        Instruction:    Based on your assessment of the patient and the clinical information, please provide the requested documentation by clicking on the appropriate radio button and enter any additional information if prompted.    Question: Please further clarify the relationship between the UTI and the urinary device    When answering this query, please exercise your independent professional judgment. The fact that a question is being asked, does not imply that any particular answer is desired or expected.    The patient's clinical indicators include:  Clinical Information:  Pt has a castaneda catheter.  States he has blood in the castaneda bag starting this morning.    Clinical Indicators:   ED PN:  \"  He recently had cauda equina syndrome and had to have a lumbar decompression in which he developed neurogenic bladder now requiring Castaneda catheter.\"  \" His catheter has not yet been changed since the procedure which she reports was coming up on 2 months ago.\"  UA:  3+blood; LE:  500; >50 WBC    6/10 ID PN:  \"E. Coli bacteremia likely secondary to UTI-susceptible to cefuroxime, ceftriaxone\"  Treatment:    6/10 DC Summary:  \"Pt was discharged to SNF after surgery and apparently was having issues with incontinence therefore castaneda was placed. \"    U/A/Culture; Sepsis [place]; ID Consult; Rocephin IV; NS Bolus (multiple 1 L Bolus); Vanco IV; Pharmacy Consult; 3 Way Bladder Irrigation;    Risk Factors:  Recent Surgery; Cauda Equina Syndrome; Obesity; Asperger's; Neurogenic Bladder  Options provided:  -- UTI related to Indwelling Castaneda Catheter POA  -- " UTI unrelated to Indwelling Godoy Catheter POA  -- Other - I will add my own diagnosis  -- Refer to Clinical Documentation Reviewer    Query created by: Millie Solano on 6/17/2024 3:43 PM      Electronically signed by:  ANNIE ALVAREZ MD 6/18/2024 7:49 AM

## 2024-06-19 ENCOUNTER — OFFICE VISIT (OUTPATIENT)
Dept: ORTHOPEDIC SURGERY | Facility: CLINIC | Age: 24
End: 2024-06-19
Payer: COMMERCIAL

## 2024-06-19 DIAGNOSIS — Z98.890 S/P LUMBAR LAMINECTOMY: Primary | ICD-10-CM

## 2024-06-19 DIAGNOSIS — M99.73 CONNECTIVE TISSUE AND DISC STENOSIS OF INTERVERTEBRAL FORAMINA OF LUMBAR REGION: ICD-10-CM

## 2024-06-19 PROCEDURE — 99024 POSTOP FOLLOW-UP VISIT: CPT | Performed by: ORTHOPAEDIC SURGERY

## 2024-06-19 ASSESSMENT — PAIN - FUNCTIONAL ASSESSMENT: PAIN_FUNCTIONAL_ASSESSMENT: NO/DENIES PAIN

## 2024-06-19 NOTE — PROGRESS NOTES
S: Erwin Cline is a 24 y.o. year old male patient who is 7 weeks out status post L4-L5 laminectomy decompression for stenosis radicular leg pain and saddle anesthesia with bowel and bladder incontinence on May 2, 2024.  Since I last saw him he was admitted to the hospital for E. coli bacteremia from UTI.  He had his Godoy removed.  He still having some bowel and bladder incontinence.  But no radicular leg pain or weakness.  He is now back at home with his family.  He is ambulating short distances with a walker.  He still needs a lot of therapy to work on stamina and going up and down the stairs but he is much better than what he was before where he was completely sedentary prior to his surgery.  He did lose some weight since his surgery.  He is still working on seeing a psychiatrist for his mental health.         O:   General: Patient appears well-nourished and well-developed in no acute distress, Alert and Oriented x3.  Morbidly obese male  Psych: Pleasant mood and affect  HEENT: Extraocular muscles intact, pupils equal and round. Sclerae anicteric   Cardio: extremities warm and well perfused  Resp: unlabored symmetric breathing  Skin: His posterior lumbar incision well-healed  Musculoskeletal/Neuro Exam: Ambulating with a wheeled walker      Lower extremity    Motor: Right leg with 5 out of 5 motor strength with hip flexion, knee extension, ankle dorsiflexion plantarflexion EHL against resistance  Left leg with 5 out of 5 motor strength with hip flexion, knee extension, ankle dorsiflexion plantarflexion EHL against resistance    Sensation to light touch intact along L2-S1 bilateraly some numbness in his medial thigh                  A/P: Erwin Cline is a 24 y.o. year old male patient who is 7 weeks out status post L4-L5 laminectomy decompression on May 2, 2024.  At this point he has no restrictions.  Recommend outpatient physical therapy to work with ambulation and building strength and  stamina in his gait.  Recommend continue working on weight management and weight loss.  Encouraged him to continue exercises and home exercises even once he is done with therapy.  In regards to his bowel and bladder only time will tell how much will improve.  He will follow-up with me in 2 months.    After our discussion, the patient articulated understanding of the plan and felt that all questions had been answered satisfactorily. The patient was pleased with the visit and very appreciative for the care rendered.    **Please excuse any errors in grammar or translation related to this dictation. Voice recognition software was utilized to prepare this document. **                  Marina Grier MD    Department of Orthopaedic Surgery  [facility]  [email or web address]

## 2024-07-11 ENCOUNTER — APPOINTMENT (OUTPATIENT)
Dept: ORTHOPEDIC SURGERY | Facility: HOSPITAL | Age: 24
End: 2024-07-11
Payer: COMMERCIAL

## 2024-09-22 ENCOUNTER — APPOINTMENT (OUTPATIENT)
Dept: RADIOLOGY | Facility: HOSPITAL | Age: 24
End: 2024-09-22
Payer: COMMERCIAL

## 2024-09-22 ENCOUNTER — HOSPITAL ENCOUNTER (EMERGENCY)
Facility: HOSPITAL | Age: 24
Discharge: HOME | End: 2024-09-22
Payer: COMMERCIAL

## 2024-09-22 VITALS
WEIGHT: 315 LBS | HEIGHT: 67 IN | TEMPERATURE: 98.2 F | OXYGEN SATURATION: 95 % | BODY MASS INDEX: 49.44 KG/M2 | HEART RATE: 92 BPM | RESPIRATION RATE: 16 BRPM | DIASTOLIC BLOOD PRESSURE: 82 MMHG | SYSTOLIC BLOOD PRESSURE: 142 MMHG

## 2024-09-22 DIAGNOSIS — S39.012A LUMBAR STRAIN, INITIAL ENCOUNTER: Primary | ICD-10-CM

## 2024-09-22 PROCEDURE — 96372 THER/PROPH/DIAG INJ SC/IM: CPT

## 2024-09-22 PROCEDURE — 99283 EMERGENCY DEPT VISIT LOW MDM: CPT | Mod: 25

## 2024-09-22 PROCEDURE — 72131 CT LUMBAR SPINE W/O DYE: CPT | Performed by: RADIOLOGY

## 2024-09-22 PROCEDURE — 72131 CT LUMBAR SPINE W/O DYE: CPT

## 2024-09-22 PROCEDURE — 2500000004 HC RX 250 GENERAL PHARMACY W/ HCPCS (ALT 636 FOR OP/ED)

## 2024-09-22 PROCEDURE — 2500000001 HC RX 250 WO HCPCS SELF ADMINISTERED DRUGS (ALT 637 FOR MEDICARE OP)

## 2024-09-22 RX ORDER — KETOROLAC TROMETHAMINE 30 MG/ML
30 INJECTION, SOLUTION INTRAMUSCULAR; INTRAVENOUS ONCE
Status: COMPLETED | OUTPATIENT
Start: 2024-09-22 | End: 2024-09-22

## 2024-09-22 RX ORDER — ORPHENADRINE CITRATE 30 MG/ML
60 INJECTION INTRAMUSCULAR; INTRAVENOUS ONCE
Status: COMPLETED | OUTPATIENT
Start: 2024-09-22 | End: 2024-09-22

## 2024-09-22 RX ORDER — OXYCODONE AND ACETAMINOPHEN 5; 325 MG/1; MG/1
2 TABLET ORAL ONCE
Status: COMPLETED | OUTPATIENT
Start: 2024-09-22 | End: 2024-09-22

## 2024-09-22 RX ORDER — OXYCODONE AND ACETAMINOPHEN 5; 325 MG/1; MG/1
1 TABLET ORAL EVERY 6 HOURS PRN
Qty: 12 TABLET | Refills: 0 | Status: SHIPPED | OUTPATIENT
Start: 2024-09-22 | End: 2024-09-25

## 2024-09-22 RX ORDER — PREDNISONE 50 MG/1
50 TABLET ORAL ONCE
Status: COMPLETED | OUTPATIENT
Start: 2024-09-22 | End: 2024-09-22

## 2024-09-22 ASSESSMENT — COLUMBIA-SUICIDE SEVERITY RATING SCALE - C-SSRS
1. IN THE PAST MONTH, HAVE YOU WISHED YOU WERE DEAD OR WISHED YOU COULD GO TO SLEEP AND NOT WAKE UP?: NO
6. HAVE YOU EVER DONE ANYTHING, STARTED TO DO ANYTHING, OR PREPARED TO DO ANYTHING TO END YOUR LIFE?: NO
2. HAVE YOU ACTUALLY HAD ANY THOUGHTS OF KILLING YOURSELF?: NO

## 2024-09-22 ASSESSMENT — PAIN SCALES - GENERAL: PAINLEVEL_OUTOF10: 5 - MODERATE PAIN

## 2024-09-22 ASSESSMENT — PAIN - FUNCTIONAL ASSESSMENT: PAIN_FUNCTIONAL_ASSESSMENT: 0-10

## 2024-09-23 NOTE — ED PROVIDER NOTES
HPI   Chief Complaint   Patient presents with    Back Pain     Pt stated that he had surgery 4 months ago and twisted his back a few days ago and now it hurts.        HPI  Patient is a 24-year-old male who presents to ED for lower back pain x 3 days.  Patient has a history of laminectomy in the lumbar spine for herniated disc.  Patient ambulates with a walker at home.  He states he pulled his back 3 days ago and it hurts where he had surgery.  He denies any numbness or weakness of the lower extremities, bowel or bladder incontinence, fever or chills, saddle anesthesia.  No other acute complaints.      Patient History   Past Medical History:   Diagnosis Date    ADHD (attention deficit hyperactivity disorder)     Asperger's syndrome ([facility]-H[facility])     OCD (obsessive compulsive disorder)      Past Surgical History:   Procedure Laterality Date    LAMINECTOMY      LUMBAR LAMINECTOMY       Family History   Problem Relation Name Age of Onset    No Known Problems Mother      No Known Problems Father       Social History     Tobacco Use    Smoking status: Never     Passive exposure: Never    Smokeless tobacco: Never   Vaping Use    Vaping status: Never Used   Substance Use Topics    Alcohol use: Never    Drug use: Never       Physical Exam   ED Triage Vitals [09/22/24 1710]   Temperature Heart Rate Respirations BP   36.8 °C (98.2 °F) 96 15 125/75      Pulse Ox Temp Source Heart Rate Source Patient Position   96 % Oral Monitor Sitting      BP Location FiO2 (%)     Right arm --       Physical Exam  Vitals reviewed.   Constitutional:       General: He is not in acute distress.     Appearance: Normal appearance. He is obese. He is not ill-appearing.   HENT:      Head: Normocephalic and atraumatic.   Eyes:      Extraocular Movements: Extraocular movements intact.   Cardiovascular:      Rate and Rhythm: Normal rate and regular rhythm.      Heart sounds: Normal heart sounds.   Pulmonary:      Effort: Pulmonary effort is normal.       Breath sounds: Normal breath sounds.   Abdominal:      General: Abdomen is flat.      Palpations: Abdomen is soft.      Tenderness: There is no abdominal tenderness.   Musculoskeletal:         General: Normal range of motion.      Cervical back: Normal, normal range of motion and neck supple.      Thoracic back: Normal.      Lumbar back: Normal.   Skin:     General: Skin is warm and dry.   Neurological:      General: No focal deficit present.      Mental Status: He is alert and oriented to person, place, and time.      Sensory: No sensory deficit.      Motor: No weakness.      Coordination: Coordination normal.      Deep Tendon Reflexes: Reflexes normal.   Psychiatric:         Mood and Affect: Mood normal.         Behavior: Behavior normal.           ED Course & MDM   Diagnoses as of 09/22/24 2048   Lumbar strain, initial encounter                 No data recorded     [place] Coma Scale Score: 15 (09/22/24 1711 : Yanira Serrano RN)                           Medical Decision Making  Parts of this chart have been completed using voice recognition software. Please excuse any errors of transcription.  My thought process and reason for plan has been formulated from the time that I saw the patient until the time of disposition and is not specific to one specific moment during their visit and furthermore my MDM encompasses this entire chart and not only this text box.    HPI:   Detailed above.    Exam:   A medically appropriate exam performed, outlined above, given the known history and presentation.    History obtained from:   Patient    EKG/Cardiac monitor:     Social Determinants of Health considered during this visit:     Medications given during visit:  Medications   ketorolac (Toradol) injection 30 mg (30 mg intramuscular Given 9/22/24 1848)   orphenadrine (Norflex) injection 60 mg (60 mg intramuscular Given 9/22/24 1848)   predniSONE (Deltasone) tablet 50 mg (50 mg oral Given 9/22/24 1849)    oxyCODONE-acetaminophen (Percocet) 5-325 mg per tablet 2 tablet (2 tablets oral Given 9/22/24 1950)        Diagnostic/tests:  Labs Reviewed - No data to display   CT lumbar spine wo IV contrast   Final Result   Status post left partial laminectomy at L4-L5 with possible   persistent severe canal stenosis.        Straightening of the lumbar spine with mild multilevel degenerative   changes which appear superimposed on congenitally shortened   pedicles/narrow canal with appearance of multilevel moderate canal   narrowing.             MACRO:   None.        Signed by: Sandee Mendoza 9/22/2024 7:10 PM   Dictation workstation:   [id number]           [facility] Summary:  Patient reports some relief of pain with Toradol and Norflex.  Patient is safe for discharge and will follow-up with his primary care provider.    We have discussed the diagnosis and risks, and we agree with discharging home to follow-up with appropriate physician as directed. We also discussed returning to the Emergency Department immediately if new or worsening symptoms occur. We have discussed the symptoms which are most concerning that necessitate immediate return. Pt symptoms have been well controlled here and the patient is safe for discharge with appropriate outpatient follow up. The patient has verbalized understanding to return to ER without delay for new or worsening pains or for any other symptoms or concerns. I utilized the discharge clinical management tool provided [employer] to help estimate risk of negative outcome for this patient.        Procedure  Procedures     Francis Engle PA-C  09/23/24 0989

## 2025-01-08 ENCOUNTER — APPOINTMENT (OUTPATIENT)
Dept: ORTHOPEDIC SURGERY | Facility: CLINIC | Age: 25
End: 2025-01-08
Payer: COMMERCIAL

## 2025-01-15 ENCOUNTER — APPOINTMENT (OUTPATIENT)
Dept: ORTHOPEDIC SURGERY | Facility: CLINIC | Age: 25
End: 2025-01-15
Payer: COMMERCIAL

## 2025-05-20 ENCOUNTER — OFFICE VISIT (OUTPATIENT)
Dept: ORTHOPEDIC SURGERY | Facility: CLINIC | Age: 25
End: 2025-05-20
Payer: MEDICAID

## 2025-05-20 ENCOUNTER — HOSPITAL ENCOUNTER (OUTPATIENT)
Dept: RADIOLOGY | Facility: CLINIC | Age: 25
Discharge: HOME | End: 2025-05-20
Payer: MEDICAID

## 2025-05-20 VITALS — HEIGHT: 75 IN | BODY MASS INDEX: 39.17 KG/M2 | WEIGHT: 315 LBS

## 2025-05-20 DIAGNOSIS — M54.16 SPINAL STENOSIS OF LUMBAR REGION WITH RADICULOPATHY: ICD-10-CM

## 2025-05-20 DIAGNOSIS — G83.4 CAUDA EQUINA SYNDROME (MULTI): ICD-10-CM

## 2025-05-20 DIAGNOSIS — M48.061 SPINAL STENOSIS OF LUMBAR REGION WITH RADICULOPATHY: ICD-10-CM

## 2025-05-20 DIAGNOSIS — Z98.890 S/P LUMBAR LAMINECTOMY: Primary | ICD-10-CM

## 2025-05-20 DIAGNOSIS — E66.813 CLASS 3 SEVERE OBESITY DUE TO EXCESS CALORIES WITH SERIOUS COMORBIDITY AND BODY MASS INDEX (BMI) OF 50.0 TO 59.9 IN ADULT: ICD-10-CM

## 2025-05-20 PROCEDURE — 99214 OFFICE O/P EST MOD 30 MIN: CPT | Performed by: ORTHOPAEDIC SURGERY

## 2025-05-20 PROCEDURE — 3008F BODY MASS INDEX DOCD: CPT | Performed by: ORTHOPAEDIC SURGERY

## 2025-05-20 PROCEDURE — 1036F TOBACCO NON-USER: CPT | Performed by: ORTHOPAEDIC SURGERY

## 2025-05-20 PROCEDURE — 72110 X-RAY EXAM L-2 SPINE 4/>VWS: CPT | Performed by: RADIOLOGY

## 2025-05-20 PROCEDURE — 72120 X-RAY BEND ONLY L-S SPINE: CPT

## 2025-05-20 ASSESSMENT — PAIN - FUNCTIONAL ASSESSMENT: PAIN_FUNCTIONAL_ASSESSMENT: 0-10

## 2025-05-20 ASSESSMENT — PAIN SCALES - GENERAL: PAINLEVEL_OUTOF10: 0 - NO PAIN

## 2025-05-20 NOTE — PROGRESS NOTES
S: Erwin Cline is a 25 y.o. year old male patient who is here to follow up on his lower back.  He is more than 1 year out status post L4-L5 laminectomy decompression for cauda equina syndrome on May 2, 2024.  He was last seen in my clinic back in June 19 at 2024 and was lost to follow-up he was supposed to see me back in 2 months.    He presents today for follow-up he states that since I last saw him his radicular leg pain has resolved he still has patches of numbness in his legs he still has saddle anesthesia.  He has a little bit more control in terms of his bowel and bladder.  He has to follow a specific schedule to make sure he does not have any accidents.  He is otherwise ambulating without any assistive devices however he noticed that he has is starting to get some twinge in his lower back.  He denies any radicular leg pain or new numbness or weakness.  He states that the twinge in his lower back usually when he lies flat on his back with his leg straight feels better if he lies in the fetal position.  He noticed also worse lower back pain when he sitting for long peers of time.    Patient never completed any outpatient physical therapy since he never followed up after his last visit.  He completed a course of home therapy.  He is currently still living with his parents.  He is trying to look for a job.      O:     General: Patient appears well-nourished and well-developed in no acute distress, Alert and Oriented x3.  Morbidly obese male  Psych: Pleasant mood and affect  HEENT: Extraocular muscles intact, pupils equal and round. Sclerae anicteric   Cardio: extremities warm and well perfused  Resp: unlabored symmetric breathing  Skin: His posterior lumbar incision well-healed  Musculoskeletal/Neuro Exam: Antalgic gait without any assistive device.  Patient walks with his knees slightly flexed.  Patient with extremely extremely tight hamstrings bilaterally with flexion contractures of both knees.         Lower extremity     Motor: Right leg with 5 out of 5 motor strength with hip flexion, knee extension, ankle dorsiflexion plantarflexion EHL against resistance  Left leg with 5 out of 5 motor strength with hip flexion, knee extension, ankle dorsiflexion plantarflexion EHL against resistance     Sensation to light touch intact along L2-S1 bilateraly some numbness in his medial thigh          Imaging:    I reviewed x-rays obtained in clinic today.  AP lateral flexion-extension view.  Prior laminectomy at L4-L5.  Some degenerative changes in the lower lumbar spine with the space narrowing facet arthrosis          A/P: Erwin Cline is a 25 y.o. year old male patient who is 1 year out status post L4-L5 laminectomy decompression on May 2, 2024 for severe stenosis and cauda equina syndrome.  He has improved function where he no longer has a radicular leg pain but still has some patches of numbness in his legs.  He still has some bowel and bladder issue but more controlled.  Today he is mainly here for lower back pain.  I think his issue is likely more muscle related he has really tight hamstrings with some flexion contracture of his knees.  He has weak core strength and back strength.  I strongly recommend outpatient physical therapy to work on core back and lower extremity stretching strengthening exercises.  I advised him he needs to avoid sitting for long periods of time or lying in bed for long periods of time.  He needs to get into an exercise regimen where he is consistent with it.  I also extremely encouraged him to consider weight loss.  He was given a referral for nutrition consult to help with this.  He was given referral for physical therapy as an outpatient today as well.  I can see him back in 3 months for reassessment.    After our discussion, the patient articulated understanding of the plan and felt that all questions had been answered satisfactorily. The patient was pleased with the visit and  very appreciative for the care rendered.    **Please excuse any errors in grammar or translation related to this dictation. Voice recognition software was utilized to prepare this document. **                  Marina Grier MD    Department of Orthopaedic Surgery  [facility]  [email or web address]

## 2025-05-20 NOTE — LETTER
May 20, 2025     JOAN Alvarado  [id number] [address] [id number]    Patient: Erwin Cline   YOB: 2000   Date of Visit: 5/20/2025       Dear JOAN Frey:    Thank you for referring Erwin Cline to me for evaluation. Below are my notes for this consultation.  If you have questions, please do not hesitate to call me. I look forward to following your patient along with you.       Sincerely,     Marina Grier MD      CC: No Recipients  ______________________________________________________________________________________    S: Erwin Cline is a 25 y.o. year old male patient who is here to follow up on his lower back.  He is more than 1 year out status post L4-L5 laminectomy decompression for cauda equina syndrome on May 2, 2024.  He was last seen in my clinic back in June 19 at 2024 and was lost to follow-up he was supposed to see me back in 2 months.    He presents today for follow-up he states that since I last saw him his radicular leg pain has resolved he still has patches of numbness in his legs he still has saddle anesthesia.  He has a little bit more control in terms of his bowel and bladder.  He has to follow a specific schedule to make sure he does not have any accidents.  He is otherwise ambulating without any assistive devices however he noticed that he has is starting to get some twinge in his lower back.  He denies any radicular leg pain or new numbness or weakness.  He states that the twinge in his lower back usually when he lies flat on his back with his leg straight feels better if he lies in the fetal position.  He noticed also worse lower back pain when he sitting for long peers of time.    Patient never completed any outpatient physical therapy since he never followed up after his last visit.  He completed a course of home therapy.  He is currently still living with his parents.  He is trying to look for a  job.      O:     General: Patient appears well-nourished and well-developed in no acute distress, Alert and Oriented x3.  Morbidly obese male  Psych: Pleasant mood and affect  HEENT: Extraocular muscles intact, pupils equal and round. Sclerae anicteric   Cardio: extremities warm and well perfused  Resp: unlabored symmetric breathing  Skin: His posterior lumbar incision well-healed  Musculoskeletal/Neuro Exam: Antalgic gait without any assistive device.  Patient walks with his knees slightly flexed.  Patient with extremely extremely tight hamstrings bilaterally with flexion contractures of both knees.        Lower extremity     Motor: Right leg with 5 out of 5 motor strength with hip flexion, knee extension, ankle dorsiflexion plantarflexion EHL against resistance  Left leg with 5 out of 5 motor strength with hip flexion, knee extension, ankle dorsiflexion plantarflexion EHL against resistance     Sensation to light touch intact along L2-S1 bilateraly some numbness in his medial thigh          Imaging:    I reviewed x-rays obtained in clinic today.  AP lateral flexion-extension view.  Prior laminectomy at L4-L5.  Some degenerative changes in the lower lumbar spine with the space narrowing facet arthrosis          A/P: Erwin Cline is a 25 y.o. year old male patient who is 1 year out status post L4-L5 laminectomy decompression on May 2, 2024 for severe stenosis and cauda equina syndrome.  He has improved function where he no longer has a radicular leg pain but still has some patches of numbness in his legs.  He still has some bowel and bladder issue but more controlled.  Today he is mainly here for lower back pain.  I think his issue is likely more muscle related he has really tight hamstrings with some flexion contracture of his knees.  He has weak core strength and back strength.  I strongly recommend outpatient physical therapy to work on core back and lower extremity stretching strengthening exercises.   I advised him he needs to avoid sitting for long periods of time or lying in bed for long periods of time.  He needs to get into an exercise regimen where he is consistent with it.  I also extremely encouraged him to consider weight loss.  He was given a referral for nutrition consult to help with this.  He was given referral for physical therapy as an outpatient today as well.  I can see him back in 3 months for reassessment.    After our discussion, the patient articulated understanding of the plan and felt that all questions had been answered satisfactorily. The patient was pleased with the visit and very appreciative for the care rendered.    **Please excuse any errors in grammar or translation related to this dictation. Voice recognition software was utilized to prepare this document. **                  Marina Grier MD    Department of Orthopaedic Surgery  [facility]  [email or web address]

## (undated) DEVICE — PAD, GROUNDING, ELECTROSURGICAL, W/9 FT CABLE, POLYHESIVE II, ADULT, LF

## (undated) DEVICE — CATHETER TRAY, SURESTEP, 16FR, URINE METER W/STATLOCK

## (undated) DEVICE — DRESSING, PREVENA, PEEL AND PLACE, 13CM

## (undated) DEVICE — CLIPPER, SURGICAL BLADE ASSEMBLY, GENERAL PURPOSE, SINGLE USE

## (undated) DEVICE — Device

## (undated) DEVICE — STAPLER, SKIN PROXIMATE, 35 WIDE

## (undated) DEVICE — LEGEND, BALL FLUTED, 9 CM, 3 MM

## (undated) DEVICE — SPONGE, HEMOSTATIC, GELATIN, SURGIFOAM, 8 X 12.5 CM X 10 MM

## (undated) DEVICE — SEALER, BIPOLAR, AQUA MANTYS 6.0

## (undated) DEVICE — SUTURE, ETHILON, 3-0, 30 IN, FS-1, BLACK

## (undated) DEVICE — BONE, MILL, MIDAS REX, DUAL BLADE, ELECTRIC

## (undated) DEVICE — DRAPE, INSTRUMENT, W/POUCH, STERI DRAPE, 7 X 11 IN, DISPOSABLE, STERILE

## (undated) DEVICE — LEGEND, LUB DIFFUSER PACK

## (undated) DEVICE — SUTURE, VICRYL, 2-0, 18 IN CP-2, UNDYED

## (undated) DEVICE — SYRINGE, 20 CC, LUER LOCK, MONOJECT, W/O CAP, LF

## (undated) DEVICE — ELECTRODE, ELECTROSURGICAL, BLADE, INSULATED, ENT/IMA, STERILE

## (undated) DEVICE — SEALANT, HEMOSTATIC, FLOSEAL, 10 ML

## (undated) DEVICE — SPONGE, GAUZE, XRAY DECT, 16 PLY, 4 X 4, W/MASTER DMT,STERILE

## (undated) DEVICE — APPLICATOR, CHLORAPREP, W/ORANGE TINT, 26ML

## (undated) DEVICE — EVACUATOR, WOUND, CLOSED, 3 SPRING, 400 CC, Y CONNECTING TUBE

## (undated) DEVICE — APPLICATOR, PREP, CHLORAPREP, W/ORANGE TINT, 10.5ML

## (undated) DEVICE — LEGEND, BALL FLUTED, 9 CM, 5MM

## (undated) DEVICE — KIT, PATIENT CARE, JACKSON TABLE W/PRONE-SAFE HEADREST

## (undated) DEVICE — MANIFOLD, 4 PORT NEPTUNE STANDARD

## (undated) DEVICE — DRAIN, WOUND, ROUND, W/TROCAR, HOLE PATTERN, 10 IN, MEDIUM/LARGE, 3/16 X 49 IN

## (undated) DEVICE — DRAPE, SHEET, FAN FOLDED, HALF, 44 X 58 IN, DISPOSABLE, LF, STERILE

## (undated) DEVICE — COVER, TABLE, UHC

## (undated) DEVICE — THERAPY UNIT, 14-DAY PREVENA PLUS 125

## (undated) DEVICE — SUTURE, VICRYL, 0, 18 IN, CT-1, UNDYED

## (undated) DEVICE — BUR, ROUND 3MM OSTEON, ELITE, SOFT TOUCH

## (undated) DEVICE — COVER, CART, 45 X 27 X 48 IN, CLEAR

## (undated) DEVICE — DRESSING, ADHESIVE, ISLAND, TELFA, 2 X 3.75 IN, LF

## (undated) DEVICE — DRESSING, ISLAND, ADHESIVE, TELFA, 4 X 8 IN

## (undated) DEVICE — TUBING, SUCTION, CONNECTING, STERILE 0.25 X 120 IN., LF

## (undated) DEVICE — ADHESIVE, SKIN, LIQUIBAND EXCEED